# Patient Record
Sex: FEMALE | Race: WHITE
[De-identification: names, ages, dates, MRNs, and addresses within clinical notes are randomized per-mention and may not be internally consistent; named-entity substitution may affect disease eponyms.]

---

## 2017-08-27 ENCOUNTER — HOSPITAL ENCOUNTER (EMERGENCY)
Dept: HOSPITAL 53 - M ED | Age: 62
Discharge: HOME | End: 2017-08-27
Payer: SELF-PAY

## 2017-08-27 VITALS — DIASTOLIC BLOOD PRESSURE: 70 MMHG | SYSTOLIC BLOOD PRESSURE: 132 MMHG

## 2017-08-27 VITALS — WEIGHT: 248.46 LBS | HEIGHT: 66 IN | BODY MASS INDEX: 39.93 KG/M2

## 2017-08-27 DIAGNOSIS — F99: ICD-10-CM

## 2017-08-27 DIAGNOSIS — J41.8: Primary | ICD-10-CM

## 2017-08-27 DIAGNOSIS — Z88.1: ICD-10-CM

## 2018-05-16 ENCOUNTER — HOSPITAL ENCOUNTER (OUTPATIENT)
Dept: HOSPITAL 53 - M RAD | Age: 63
End: 2018-05-16
Attending: INTERNAL MEDICINE
Payer: MEDICARE

## 2018-05-16 DIAGNOSIS — R22.42: Primary | ICD-10-CM

## 2018-05-16 PROCEDURE — 93971 EXTREMITY STUDY: CPT

## 2018-07-30 ENCOUNTER — HOSPITAL ENCOUNTER (INPATIENT)
Dept: HOSPITAL 53 - M ED | Age: 63
LOS: 9 days | Discharge: HOME | DRG: 885 | End: 2018-08-08
Attending: PSYCHIATRY & NEUROLOGY | Admitting: PSYCHIATRY & NEUROLOGY
Payer: MEDICARE

## 2018-07-30 DIAGNOSIS — Z87.891: ICD-10-CM

## 2018-07-30 DIAGNOSIS — Z88.1: ICD-10-CM

## 2018-07-30 DIAGNOSIS — R51: ICD-10-CM

## 2018-07-30 DIAGNOSIS — K59.00: ICD-10-CM

## 2018-07-30 DIAGNOSIS — Z79.82: ICD-10-CM

## 2018-07-30 DIAGNOSIS — I48.0: ICD-10-CM

## 2018-07-30 DIAGNOSIS — E11.9: ICD-10-CM

## 2018-07-30 DIAGNOSIS — F20.0: Primary | ICD-10-CM

## 2018-07-30 DIAGNOSIS — Z98.51: ICD-10-CM

## 2018-07-30 DIAGNOSIS — Z79.899: ICD-10-CM

## 2018-07-30 DIAGNOSIS — E66.9: ICD-10-CM

## 2018-07-30 DIAGNOSIS — M54.9: ICD-10-CM

## 2018-07-30 LAB
ACETAMINOPHEN LEVEL: < 2 UG/ML (ref 10–30)
ALBUMIN/GLOBULIN RATIO: 0.89 (ref 1–1.93)
ALBUMIN: 3.4 GM/DL (ref 3.2–5.2)
ALKALINE PHOSPHATASE: 73 U/L (ref 45–117)
ALT SERPL W P-5'-P-CCNC: 27 U/L (ref 12–78)
AMPHETAMINES UR QL SCN: NEGATIVE
ANION GAP: 7 MEQ/L (ref 8–16)
AST SERPL-CCNC: 20 U/L (ref 7–37)
BARBITURATES UR QL SCN: NEGATIVE
BENZODIAZ UR QL SCN: NEGATIVE
BILIRUB CONJ SERPL-MCNC: 0.2 MG/DL (ref 0–0.2)
BILIRUBIN,TOTAL: 0.7 MG/DL (ref 0.2–1)
BLOOD UREA NITROGEN: 13 MG/DL (ref 7–18)
BZE UR QL SCN: NEGATIVE
CALCIUM LEVEL: 8.2 MG/DL (ref 8.8–10.2)
CANNABINOIDS UR QL SCN: NEGATIVE
CARBON DIOXIDE LEVEL: 28 MEQ/L (ref 21–32)
CHLORIDE LEVEL: 106 MEQ/L (ref 98–107)
CREATININE FOR GFR: 0.68 MG/DL (ref 0.55–1.3)
ETHYL ALCOHOL (ETHANOL): < 0.003 % (ref 0–0.01)
GFR SERPL CREATININE-BSD FRML MDRD: > 60 ML/MIN/{1.73_M2} (ref 45–?)
GLUCOSE, FASTING: 129 MG/DL (ref 70–100)
HEMATOCRIT: 40.1 % (ref 36–47)
HEMOGLOBIN: 12.9 G/DL (ref 12–15.5)
MEAN CORPUSCULAR HEMOGLOBIN: 28.3 PG (ref 27–33)
MEAN CORPUSCULAR HGB CONC: 32.2 G/DL (ref 32–36.5)
MEAN CORPUSCULAR VOLUME: 87.9 FL (ref 80–96)
METHADONE URINE: NEGATIVE
NRBC BLD AUTO-RTO: 0 % (ref 0–0)
OPIATES UR QL SCN: NEGATIVE
PCP UR QL SCN: NEGATIVE
PLATELET COUNT, AUTOMATED: 201 10^3/UL (ref 150–450)
POTASSIUM SERUM: 4.1 MEQ/L (ref 3.5–5.1)
RED BLOOD COUNT: 4.56 10^6/UL (ref 4–5.4)
RED CELL DISTRIBUTION WIDTH: 14 % (ref 11.5–14.5)
SALICYLATE LEVEL: 1.7 MG/DL (ref 5–30)
SODIUM LEVEL: 141 MEQ/L (ref 136–145)
SPECIFIC GRAVITY,URINE: 1.01 (ref 1–1.03)
THYROID STIMULATING HORMONE: 1.51 UIU/ML (ref 0.36–3.74)
TOTAL PROTEIN: 7.2 GM/DL (ref 6.4–8.2)
WHITE BLOOD COUNT: 6.5 10^3/UL (ref 4–10)

## 2018-07-30 RX ADMIN — ACETAMINOPHEN 1 MG: 325 TABLET ORAL at 20:57

## 2018-07-31 LAB
CK MB CFR.DF SERPL CALC: 1.19
CK MB CFR.DF SERPL CALC: 1.73
CK SERPL-CCNC: 104 U/L (ref 26–192)
CK SERPL-CCNC: 226 U/L (ref 26–192)
CK-MB VALUE MASS: 1.8 NG/ML (ref ?–3.6)
CK-MB VALUE MASS: 2.7 NG/ML (ref ?–3.6)
TROPONIN I: < 0.02 NG/ML (ref ?–0.1)
TROPONIN I: < 0.02 NG/ML (ref ?–0.1)

## 2018-07-31 RX ADMIN — ACETAMINOPHEN 1 MG: 325 TABLET ORAL at 15:49

## 2018-07-31 RX ADMIN — DOCUSATE SODIUM 1 MG: 100 CAPSULE, LIQUID FILLED ORAL at 11:13

## 2018-07-31 RX ADMIN — DOCUSATE SODIUM 1 MG: 100 CAPSULE, LIQUID FILLED ORAL at 21:00

## 2018-07-31 RX ADMIN — ACETAMINOPHEN 1 MG: 325 TABLET ORAL at 09:36

## 2018-08-01 LAB
ANION GAP: 9 MEQ/L (ref 8–16)
BLOOD UREA NITROGEN: 17 MG/DL (ref 7–18)
CALCIUM LEVEL: 8.6 MG/DL (ref 8.8–10.2)
CARBON DIOXIDE LEVEL: 30 MEQ/L (ref 21–32)
CHLORIDE LEVEL: 103 MEQ/L (ref 98–107)
CREATININE FOR GFR: 0.8 MG/DL (ref 0.55–1.3)
EST. AVERAGE GLUCOSE BLD GHB EST-MCNC: 151 MG/DL (ref 60–110)
GFR SERPL CREATININE-BSD FRML MDRD: > 60 ML/MIN/{1.73_M2} (ref 45–?)
GLUCOSE, FASTING: 127 MG/DL (ref 70–100)
HEMATOCRIT: 44 % (ref 36–47)
HEMOGLOBIN: 14.3 G/DL (ref 12–15.5)
MAGNESIUM LEVEL: 2.2 MG/DL (ref 1.8–2.4)
MEAN CORPUSCULAR HEMOGLOBIN: 28.2 PG (ref 27–33)
MEAN CORPUSCULAR HGB CONC: 32.5 G/DL (ref 32–36.5)
MEAN CORPUSCULAR VOLUME: 86.8 FL (ref 80–96)
NRBC BLD AUTO-RTO: 0 % (ref 0–0)
PLATELET COUNT, AUTOMATED: 206 10^3/UL (ref 150–450)
POTASSIUM SERUM: 4.4 MEQ/L (ref 3.5–5.1)
RED BLOOD COUNT: 5.07 10^6/UL (ref 4–5.4)
RED CELL DISTRIBUTION WIDTH: 14.1 % (ref 11.5–14.5)
SODIUM LEVEL: 142 MEQ/L (ref 136–145)
WHITE BLOOD COUNT: 7.4 10^3/UL (ref 4–10)

## 2018-08-01 RX ADMIN — DOCUSATE SODIUM 1 MG: 100 CAPSULE, LIQUID FILLED ORAL at 21:34

## 2018-08-01 RX ADMIN — ASPIRIN 1 MG: 81 TABLET ORAL at 08:55

## 2018-08-01 RX ADMIN — ACETAMINOPHEN 1 MG: 325 TABLET ORAL at 08:56

## 2018-08-01 RX ADMIN — DOCUSATE SODIUM 1 MG: 100 CAPSULE, LIQUID FILLED ORAL at 09:00

## 2018-08-02 LAB
ANION GAP: 7 MEQ/L (ref 8–16)
APPEARANCE, URINE: (no result)
BACTERIA UR QL AUTO: NEGATIVE
BILIRUBIN, URINE AUTO: NEGATIVE
BLOOD UREA NITROGEN: 17 MG/DL (ref 7–18)
BLOOD, URINE BLOOD: NEGATIVE
CALCIUM LEVEL: 8.5 MG/DL (ref 8.8–10.2)
CARBON DIOXIDE LEVEL: 29 MEQ/L (ref 21–32)
CHLORIDE LEVEL: 102 MEQ/L (ref 98–107)
CREATININE FOR GFR: 0.83 MG/DL (ref 0.55–1.3)
GFR SERPL CREATININE-BSD FRML MDRD: > 60 ML/MIN/{1.73_M2} (ref 45–?)
GLUCOSE, FASTING: 137 MG/DL (ref 70–100)
GLUCOSE, URINE (UA) AUTO: NEGATIVE MG/DL
HEMATOCRIT: 42.1 % (ref 36–47)
HEMOGLOBIN: 13.7 G/DL (ref 12–15.5)
KETONE, URINE AUTO: NEGATIVE MG/DL
LEUKOCYTE ESTERASE UR QL STRIP.AUTO: (no result)
MAGNESIUM LEVEL: 2.2 MG/DL (ref 1.8–2.4)
MEAN CORPUSCULAR HEMOGLOBIN: 28.2 PG (ref 27–33)
MEAN CORPUSCULAR HGB CONC: 32.5 G/DL (ref 32–36.5)
MEAN CORPUSCULAR VOLUME: 86.6 FL (ref 80–96)
MUCUS, URINE: (no result)
NITRITE, URINE AUTO: NEGATIVE
NRBC BLD AUTO-RTO: 0 % (ref 0–0)
PH,URINE: 5 UNITS (ref 5–9)
PLATELET COUNT, AUTOMATED: 231 10^3/UL (ref 150–450)
POTASSIUM SERUM: 4.1 MEQ/L (ref 3.5–5.1)
PROT UR QL STRIP.AUTO: NEGATIVE MG/DL
RBC, URINE AUTO: 0 /HPF (ref 0–3)
RED BLOOD COUNT: 4.86 10^6/UL (ref 4–5.4)
RED CELL DISTRIBUTION WIDTH: 14 % (ref 11.5–14.5)
SODIUM LEVEL: 138 MEQ/L (ref 136–145)
SPECIFIC GRAVITY URINE AUTO: 1.02 (ref 1–1.03)
SQUAMOUS #/AREA URNS AUTO: 1 /HPF (ref 0–6)
UROBILINOGEN, URINE AUTO: 2 MG/DL (ref 0–2)
WBC, URINE AUTO: 5 /HPF (ref 0–3)
WHITE BLOOD COUNT: 8.4 10^3/UL (ref 4–10)

## 2018-08-02 RX ADMIN — ASPIRIN 1 MG: 81 TABLET ORAL at 10:02

## 2018-08-02 RX ADMIN — DOCUSATE SODIUM 1 MG: 100 CAPSULE, LIQUID FILLED ORAL at 21:19

## 2018-08-02 RX ADMIN — DOCUSATE SODIUM 1 MG: 100 CAPSULE, LIQUID FILLED ORAL at 10:02

## 2018-08-03 LAB
ANION GAP: 7 MEQ/L (ref 8–16)
BLOOD UREA NITROGEN: 21 MG/DL (ref 7–18)
CALCIUM LEVEL: 8.7 MG/DL (ref 8.8–10.2)
CARBON DIOXIDE LEVEL: 31 MEQ/L (ref 21–32)
CHLORIDE LEVEL: 104 MEQ/L (ref 98–107)
CREATININE FOR GFR: 0.85 MG/DL (ref 0.55–1.3)
GFR SERPL CREATININE-BSD FRML MDRD: > 60 ML/MIN/{1.73_M2} (ref 45–?)
GLUCOSE, FASTING: 125 MG/DL (ref 70–100)
HEMATOCRIT: 41.4 % (ref 36–47)
HEMOGLOBIN: 13.3 G/DL (ref 12–15.5)
MAGNESIUM LEVEL: 2.3 MG/DL (ref 1.8–2.4)
MEAN CORPUSCULAR HEMOGLOBIN: 28.2 PG (ref 27–33)
MEAN CORPUSCULAR HGB CONC: 32.1 G/DL (ref 32–36.5)
MEAN CORPUSCULAR VOLUME: 87.9 FL (ref 80–96)
NRBC BLD AUTO-RTO: 0 % (ref 0–0)
PLATELET COUNT, AUTOMATED: 192 10^3/UL (ref 150–450)
POTASSIUM SERUM: 4.5 MEQ/L (ref 3.5–5.1)
RED BLOOD COUNT: 4.71 10^6/UL (ref 4–5.4)
RED CELL DISTRIBUTION WIDTH: 14 % (ref 11.5–14.5)
SODIUM LEVEL: 142 MEQ/L (ref 136–145)
WHITE BLOOD COUNT: 7 10^3/UL (ref 4–10)

## 2018-08-03 RX ADMIN — ACETAMINOPHEN 1 MG: 325 TABLET ORAL at 06:25

## 2018-08-03 RX ADMIN — ARIPIPRAZOLE 1 MG: 15 TABLET ORAL at 21:55

## 2018-08-03 RX ADMIN — ASPIRIN 1 MG: 81 TABLET ORAL at 09:02

## 2018-08-03 RX ADMIN — ACETAMINOPHEN 1 MG: 325 TABLET ORAL at 17:08

## 2018-08-03 RX ADMIN — DOCUSATE SODIUM 1 MG: 100 CAPSULE, LIQUID FILLED ORAL at 09:01

## 2018-08-03 RX ADMIN — DOCUSATE SODIUM 1 MG: 100 CAPSULE, LIQUID FILLED ORAL at 21:55

## 2018-08-04 LAB
ANION GAP: 7 MEQ/L (ref 8–16)
BLOOD UREA NITROGEN: 20 MG/DL (ref 7–18)
CALCIUM LEVEL: 8.6 MG/DL (ref 8.8–10.2)
CARBON DIOXIDE LEVEL: 30 MEQ/L (ref 21–32)
CHLORIDE LEVEL: 103 MEQ/L (ref 98–107)
CREATININE FOR GFR: 0.81 MG/DL (ref 0.55–1.3)
GFR SERPL CREATININE-BSD FRML MDRD: > 60 ML/MIN/{1.73_M2} (ref 45–?)
GLUCOSE, FASTING: 129 MG/DL (ref 70–100)
HEMATOCRIT: 42.6 % (ref 36–47)
HEMOGLOBIN: 13.7 G/DL (ref 12–15.5)
MAGNESIUM LEVEL: 2.2 MG/DL (ref 1.8–2.4)
MEAN CORPUSCULAR HEMOGLOBIN: 28.3 PG (ref 27–33)
MEAN CORPUSCULAR HGB CONC: 32.2 G/DL (ref 32–36.5)
MEAN CORPUSCULAR VOLUME: 88 FL (ref 80–96)
NRBC BLD AUTO-RTO: 0 % (ref 0–0)
PLATELET COUNT, AUTOMATED: 224 10^3/UL (ref 150–450)
POTASSIUM SERUM: 4.4 MEQ/L (ref 3.5–5.1)
RED BLOOD COUNT: 4.84 10^6/UL (ref 4–5.4)
RED CELL DISTRIBUTION WIDTH: 14 % (ref 11.5–14.5)
SODIUM LEVEL: 140 MEQ/L (ref 136–145)
WHITE BLOOD COUNT: 6.7 10^3/UL (ref 4–10)

## 2018-08-04 RX ADMIN — ACETAMINOPHEN 1 MG: 325 TABLET ORAL at 05:55

## 2018-08-04 RX ADMIN — DOCUSATE SODIUM 1 MG: 100 CAPSULE, LIQUID FILLED ORAL at 08:02

## 2018-08-04 RX ADMIN — ARIPIPRAZOLE 1 MG: 15 TABLET ORAL at 21:13

## 2018-08-04 RX ADMIN — ACETAMINOPHEN 1 MG: 325 TABLET ORAL at 21:13

## 2018-08-04 RX ADMIN — DOCUSATE SODIUM 1 MG: 100 CAPSULE, LIQUID FILLED ORAL at 21:13

## 2018-08-04 RX ADMIN — ARIPIPRAZOLE 1 MG: 15 TABLET ORAL at 08:03

## 2018-08-04 RX ADMIN — ASPIRIN 1 MG: 81 TABLET ORAL at 08:03

## 2018-08-05 LAB
ANION GAP: 7 MEQ/L (ref 8–16)
BLOOD UREA NITROGEN: 19 MG/DL (ref 7–18)
CALCIUM LEVEL: 8.8 MG/DL (ref 8.8–10.2)
CARBON DIOXIDE LEVEL: 29 MEQ/L (ref 21–32)
CHLORIDE LEVEL: 105 MEQ/L (ref 98–107)
CREATININE FOR GFR: 0.77 MG/DL (ref 0.55–1.3)
GFR SERPL CREATININE-BSD FRML MDRD: > 60 ML/MIN/{1.73_M2} (ref 45–?)
GLUCOSE, FASTING: 132 MG/DL (ref 70–100)
HEMATOCRIT: 43.3 % (ref 36–47)
HEMOGLOBIN: 13.8 G/DL (ref 12–15.5)
MAGNESIUM LEVEL: 2.3 MG/DL (ref 1.8–2.4)
MEAN CORPUSCULAR HEMOGLOBIN: 28.2 PG (ref 27–33)
MEAN CORPUSCULAR HGB CONC: 31.9 G/DL (ref 32–36.5)
MEAN CORPUSCULAR VOLUME: 88.4 FL (ref 80–96)
NRBC BLD AUTO-RTO: 0 % (ref 0–0)
PLATELET COUNT, AUTOMATED: 225 10^3/UL (ref 150–450)
POTASSIUM SERUM: 4.4 MEQ/L (ref 3.5–5.1)
RED BLOOD COUNT: 4.9 10^6/UL (ref 4–5.4)
RED CELL DISTRIBUTION WIDTH: 14.1 % (ref 11.5–14.5)
SODIUM LEVEL: 141 MEQ/L (ref 136–145)
WHITE BLOOD COUNT: 7.3 10^3/UL (ref 4–10)

## 2018-08-05 RX ADMIN — ARIPIPRAZOLE 1 MG: 15 TABLET ORAL at 21:20

## 2018-08-05 RX ADMIN — DOCUSATE SODIUM 1 MG: 100 CAPSULE, LIQUID FILLED ORAL at 08:12

## 2018-08-05 RX ADMIN — ASPIRIN 1 MG: 81 TABLET ORAL at 08:12

## 2018-08-05 RX ADMIN — ACETAMINOPHEN 1 MG: 325 TABLET ORAL at 08:12

## 2018-08-05 RX ADMIN — ARIPIPRAZOLE 1 MG: 15 TABLET ORAL at 08:12

## 2018-08-05 RX ADMIN — DOCUSATE SODIUM 1 MG: 100 CAPSULE, LIQUID FILLED ORAL at 21:19

## 2018-08-06 LAB
ANION GAP: 7 MEQ/L (ref 8–16)
BLOOD UREA NITROGEN: 18 MG/DL (ref 7–18)
CALCIUM LEVEL: 8.5 MG/DL (ref 8.8–10.2)
CARBON DIOXIDE LEVEL: 30 MEQ/L (ref 21–32)
CHLORIDE LEVEL: 103 MEQ/L (ref 98–107)
CREATININE FOR GFR: 0.84 MG/DL (ref 0.55–1.3)
GFR SERPL CREATININE-BSD FRML MDRD: > 60 ML/MIN/{1.73_M2} (ref 45–?)
GLUCOSE, FASTING: 146 MG/DL (ref 70–100)
HEMATOCRIT: 41.5 % (ref 36–47)
HEMOGLOBIN: 13 G/DL (ref 12–15.5)
MAGNESIUM LEVEL: 2.2 MG/DL (ref 1.8–2.4)
MEAN CORPUSCULAR HEMOGLOBIN: 27.7 PG (ref 27–33)
MEAN CORPUSCULAR HGB CONC: 31.3 G/DL (ref 32–36.5)
MEAN CORPUSCULAR VOLUME: 88.3 FL (ref 80–96)
NRBC BLD AUTO-RTO: 0 % (ref 0–0)
PLATELET COUNT, AUTOMATED: 218 10^3/UL (ref 150–450)
POTASSIUM SERUM: 4.2 MEQ/L (ref 3.5–5.1)
RED BLOOD COUNT: 4.7 10^6/UL (ref 4–5.4)
RED CELL DISTRIBUTION WIDTH: 14 % (ref 11.5–14.5)
SODIUM LEVEL: 140 MEQ/L (ref 136–145)
WHITE BLOOD COUNT: 6.7 10^3/UL (ref 4–10)

## 2018-08-06 RX ADMIN — DOCUSATE SODIUM 1 MG: 100 CAPSULE, LIQUID FILLED ORAL at 21:42

## 2018-08-06 RX ADMIN — ARIPIPRAZOLE 1 MG: KIT at 11:47

## 2018-08-06 RX ADMIN — ASPIRIN 1 MG: 81 TABLET ORAL at 08:45

## 2018-08-06 RX ADMIN — DOCUSATE SODIUM 1 MG: 100 CAPSULE, LIQUID FILLED ORAL at 08:45

## 2018-08-06 RX ADMIN — ACETAMINOPHEN 1 MG: 325 TABLET ORAL at 06:49

## 2018-08-06 RX ADMIN — ARIPIPRAZOLE 1 MG: 15 TABLET ORAL at 08:45

## 2018-08-07 LAB
ANION GAP: 7 MEQ/L (ref 8–16)
BLOOD UREA NITROGEN: 16 MG/DL (ref 7–18)
CALCIUM LEVEL: 8.6 MG/DL (ref 8.8–10.2)
CARBON DIOXIDE LEVEL: 27 MEQ/L (ref 21–32)
CHLORIDE LEVEL: 107 MEQ/L (ref 98–107)
CREATININE FOR GFR: 0.71 MG/DL (ref 0.55–1.3)
GFR SERPL CREATININE-BSD FRML MDRD: > 60 ML/MIN/{1.73_M2} (ref 45–?)
GLUCOSE, FASTING: 124 MG/DL (ref 70–100)
HEMATOCRIT: 40 % (ref 36–47)
HEMOGLOBIN: 13.2 G/DL (ref 12–15.5)
MAGNESIUM LEVEL: 2.2 MG/DL (ref 1.8–2.4)
MEAN CORPUSCULAR HEMOGLOBIN: 28.1 PG (ref 27–33)
MEAN CORPUSCULAR HGB CONC: 33 G/DL (ref 32–36.5)
MEAN CORPUSCULAR VOLUME: 85.3 FL (ref 80–96)
NRBC BLD AUTO-RTO: 0 % (ref 0–0)
PLATELET COUNT, AUTOMATED: 215 10^3/UL (ref 150–450)
POTASSIUM SERUM: 4.2 MEQ/L (ref 3.5–5.1)
RED BLOOD COUNT: 4.69 10^6/UL (ref 4–5.4)
RED CELL DISTRIBUTION WIDTH: 14.2 % (ref 11.5–14.5)
SODIUM LEVEL: 141 MEQ/L (ref 136–145)
WHITE BLOOD COUNT: 7.6 10^3/UL (ref 4–10)

## 2018-08-07 RX ADMIN — DOCUSATE SODIUM 1 MG: 100 CAPSULE, LIQUID FILLED ORAL at 08:49

## 2018-08-07 RX ADMIN — ACETAMINOPHEN 1 MG: 325 TABLET ORAL at 17:36

## 2018-08-07 RX ADMIN — ASPIRIN 1 MG: 81 TABLET ORAL at 08:50

## 2018-08-07 RX ADMIN — DOCUSATE SODIUM 1 MG: 100 CAPSULE, LIQUID FILLED ORAL at 19:57

## 2018-08-07 RX ADMIN — ACETAMINOPHEN 1 MG: 325 TABLET ORAL at 08:51

## 2018-08-08 RX ADMIN — DOCUSATE SODIUM 1 MG: 100 CAPSULE, LIQUID FILLED ORAL at 08:35

## 2018-08-08 RX ADMIN — ASPIRIN 1 MG: 81 TABLET ORAL at 08:36

## 2018-08-08 RX ADMIN — ACETAMINOPHEN 1 MG: 325 TABLET ORAL at 06:35

## 2020-07-07 ENCOUNTER — HOSPITAL ENCOUNTER (INPATIENT)
Dept: HOSPITAL 53 - M MSPAV | Age: 65
LOS: 2 days | Discharge: PSYCHIATRIC OR SUBSTANCE USE FACILITY | DRG: 309 | End: 2020-07-09
Attending: INTERNAL MEDICINE | Admitting: INTERNAL MEDICINE
Payer: MEDICARE

## 2020-07-07 VITALS — SYSTOLIC BLOOD PRESSURE: 139 MMHG | DIASTOLIC BLOOD PRESSURE: 93 MMHG

## 2020-07-07 VITALS — SYSTOLIC BLOOD PRESSURE: 138 MMHG | DIASTOLIC BLOOD PRESSURE: 102 MMHG

## 2020-07-07 VITALS — WEIGHT: 280.21 LBS | BODY MASS INDEX: 45.03 KG/M2 | HEIGHT: 66 IN

## 2020-07-07 VITALS — DIASTOLIC BLOOD PRESSURE: 91 MMHG | SYSTOLIC BLOOD PRESSURE: 163 MMHG

## 2020-07-07 VITALS — SYSTOLIC BLOOD PRESSURE: 154 MMHG | DIASTOLIC BLOOD PRESSURE: 78 MMHG

## 2020-07-07 DIAGNOSIS — F32.9: ICD-10-CM

## 2020-07-07 DIAGNOSIS — I10: ICD-10-CM

## 2020-07-07 DIAGNOSIS — Z91.14: ICD-10-CM

## 2020-07-07 DIAGNOSIS — M19.90: ICD-10-CM

## 2020-07-07 DIAGNOSIS — F20.0: ICD-10-CM

## 2020-07-07 DIAGNOSIS — M54.9: ICD-10-CM

## 2020-07-07 DIAGNOSIS — F41.9: ICD-10-CM

## 2020-07-07 DIAGNOSIS — Z88.8: ICD-10-CM

## 2020-07-07 DIAGNOSIS — I48.92: Primary | ICD-10-CM

## 2020-07-07 DIAGNOSIS — E11.9: ICD-10-CM

## 2020-07-07 DIAGNOSIS — E66.01: ICD-10-CM

## 2020-07-07 DIAGNOSIS — K21.9: ICD-10-CM

## 2020-07-07 DIAGNOSIS — I87.2: ICD-10-CM

## 2020-07-07 LAB
ALBUMIN SERPL BCG-MCNC: 3.2 GM/DL (ref 3.2–5.2)
ALT SERPL W P-5'-P-CCNC: 20 U/L (ref 12–78)
BILIRUB SERPL-MCNC: 0.5 MG/DL (ref 0.2–1)
BUN SERPL-MCNC: 20 MG/DL (ref 7–18)
CALCIUM SERPL-MCNC: 8.3 MG/DL (ref 8.8–10.2)
CHLORIDE SERPL-SCNC: 105 MEQ/L (ref 98–107)
CHOLEST SERPL-MCNC: 165 MG/DL (ref ?–200)
CHOLEST/HDLC SERPL: 5.89 {RATIO} (ref ?–5)
CO2 SERPL-SCNC: 27 MEQ/L (ref 21–32)
CREAT SERPL-MCNC: 0.79 MG/DL (ref 0.55–1.3)
GFR SERPL CREATININE-BSD FRML MDRD: > 60 ML/MIN/{1.73_M2} (ref 45–?)
GLUCOSE SERPL-MCNC: 195 MG/DL (ref 70–100)
HDLC SERPL-MCNC: 28 MG/DL (ref 40–?)
LDLC SERPL CALC-MCNC: 75 MG/DL (ref ?–100)
MAGNESIUM SERPL-MCNC: 2.1 MG/DL (ref 1.8–2.4)
NONHDLC SERPL-MCNC: 137 MG/DL
PHOSPHATE SERPL-MCNC: 3.3 MG/DL (ref 2.5–4.9)
POTASSIUM SERPL-SCNC: 4 MEQ/L (ref 3.5–5.1)
PROT SERPL-MCNC: 7 GM/DL (ref 6.4–8.2)
SODIUM SERPL-SCNC: 137 MEQ/L (ref 136–145)
T4 FREE SERPL-MCNC: 0.97 NG/DL (ref 0.76–1.46)
TRIGL SERPL-MCNC: 311 MG/DL (ref ?–150)
TSH SERPL DL<=0.005 MIU/L-ACNC: 0.28 UIU/ML (ref 0.36–3.74)

## 2020-07-07 RX ADMIN — INSULIN LISPRO SCH UNITS: 100 INJECTION, SOLUTION INTRAVENOUS; SUBCUTANEOUS at 20:15

## 2020-07-07 RX ADMIN — METOPROLOL TARTRATE SCH MG: 25 TABLET, FILM COATED ORAL at 17:37

## 2020-07-07 RX ADMIN — APIXABAN SCH MG: 5 TABLET, FILM COATED ORAL at 16:32

## 2020-07-07 RX ADMIN — METOPROLOL TARTRATE SCH MG: 25 TABLET, FILM COATED ORAL at 21:24

## 2020-07-07 RX ADMIN — INSULIN LISPRO SCH UNITS: 100 INJECTION, SOLUTION INTRAVENOUS; SUBCUTANEOUS at 17:43

## 2020-07-07 NOTE — HPEPDOC
[facility] Medical History & Physical


Date of Admission


Jul 7, 2020


Date of Service:  Jul 7, 2020


Attending Physician:  JACKI LYNN MD





History and Physical


CHIEF COMPLAINT: None. Incidentally noted variable heart rate on [facility] 

examination





HISTORY OF PRESENT ILLNESS: 


63 yo W with a history of medication non compliance, rarely sees doctors, 

reporting a history of abdominal hernia for which she did not follow through 

with surgery for repair, diagnosis of DM for which she takes no medications, 

GED, HTN, chronic back pain, osteoarthritis, and an extensive psychiatric 

history of schizophrenia, depression, anxiety and multiple admissions in the 

[facility] who was admitted to the [facility] today for command audiotry hallucinations 

telling her to cause self harm whom on nursing examination on arrival to the 

[facility] was noted to have a variable heart beat with variable heart rate with 

tachyardia up to 150s. The nurse requested a medicine consult and on stat EKG 

she had atrial flutter with a rate of 156. She was otherwise asymptomatic, 

normotensive and reported that she sometimes has palpitations but never worries 

about it because it does not bother her. She otherwise denied a history of a 

stroke, asymmetrical weakness, dyspnea, chest pain, abdominal pain, fever, 

chills, LE swelling, orthopnea or paroxysmal nocturnal dyspnea. I decided to 

admit her to medicine for newly noted aftrial flutter. On arrival, I initially 

admitted her to med/surg with tele but her RVR continued and I started her on 

metop 25 Q6H and she was transferred to the PCU boarding in the ICU just incase 

she would require IV medication for rate control. She ultimately broke and went 

back in to sinus. In the meantime, I started her on eliquis and consulted Dr. Car.





PAST MEDICAL HISTORY:


DM for which she takes no medications, GED, HTN, chronic back pain, 

osteoarthritis, and an extensive psychiatric history of schizophrenia, 

depression, anxiety and multiple admissions in the [facility] 





PAST SURGICAL HISTORY: Tubal ligation





SOCIAL HISTORY:


Tobacco use: NO


ETOH: NO


Illicit drug use: NO





FAMILY HISTORY: Non contributory





ALLERGIES: Please see below.





REVIEW OF SYSTEMS:12 point ROS was reviewed and all the pertinent positives are 

noted in the HPI above and was otherwise negative.





HOME MEDICATIONS: Please see below. 





PHYSICAL EXAMINATION:


VITAL SIGNS: normotensive, tachycardic to 130s, afebrile, breathing comfortably 

on room air


GENERAL APPEARANCE: Morbidly obese


HEENT: NCAT, PERRLA, EOMI, MMM


CARDIOVASCULAR: Irregularly irregular


LUNGS: CTAB


ABDOMEN: Large abdominal hernia, nontender, obese, normoactive, soft


EXTREMITIES: has bilateral LE edema, WWP


NEUROLOGICAL: normal gait, AOx3, without dysarthria


PSYCHIATRIC: Aox3





LABORATORY DATA: See below.





IMAGING: CXR pending





MICROBIOLOGY: Please see below. 





ASSESSMENT: 


63 yo W with a history of medication non compliance, rarely sees doctors, 

reporting a history of abdominal hernia for which she did not follow through 

with surgery for repair, diagnosis of DM for which she takes no medications, 

GED, HTN, chronic back pain, osteoarthritis, and an extensive psychiatric 

history of schizophrenia, depression, anxiety and multiple admissions in the 

[facility] who was admitted to the [facility] today for command auditotry hallucinations 

telling her to cause self harm whom on nursing examination on arrival to the 

[facility] was noted to have a variable heart beat with variable heart rate with 

tachyardia up to 150s and found to be in Aflutter.








PLAN:


Atrial flutter: likely paroxysmal and longstanding from her description of 

episodic palpitations


-Start eliquis 5 BID


-TTE


-TSH low, freeT4 was wnl


-CXR pending


-troponin negative


-EKG was with Aflutter without ST changes


-No noted clear source of infection


-K and Mag wnl


-Cardiology consulted. Dr. Car will see her tomorrow





DM:


-SSI, FSBG AC/HS, hypoglycemia protocol





Audiotry hallucination:


-psych consult





DVT ppx: eliquis


Dispo: PCU, boarding in the ICU





Vital Signs





Vital Signs








  Date Time  Temp Pulse Resp B/P (MAP) Pulse Ox O2 Delivery O2 Flow Rate FiO2


 


7/7/20 15:16 97.0 139 14 154/78 (103) 95 Room Air  











Laboratory Data


Labs 24H


Laboratory Tests 2


7/7/20 14:19: 


Anion Gap 5L, Glomerular Filtration Rate > 60.0, Calcium Level 8.3L, Phosphorus 

Level 3.3, Magnesium Level 2.1, Total Bilirubin 0.5, Aspartate Amino Transf 

(AST/SGOT) 18, Alanine Aminotransferase (ALT/SGPT) 20, Alkaline Phosphatase 63, 

Troponin I < 0.02, Total Protein 7.0, Albumin 3.2, Albumin/Globulin Ratio 0.8L, 

Triglycerides Level 311H, Total Cholesterol 165, LDL Cholesterol 75, Non-HDL 

Cholesterol (LDL + VLDL) 137, Total HDL Cholesterol 28L, Cholesterol/HDL Ratio 

5.892H, Thyroid Stimulating Hormone (TSH) 0.275L, Free Thyroxine 0.97


CBC/BMP


Laboratory Tests


7/7/20 14:19











Home Medications


Scheduled PRN


Aspirin/Acetaminophen/Caffeine (Headache Relief Tablet) 1 Tab Tab, 1 TAB PO Q6H 

PRN for HEADACHE





Allergies


Coded Allergies:  


     levofloxacin (Verified  Adverse Reaction, Unknown, DIARRHEA, 7/7/20)





A-FIB/CHADSVASC


A-FIB History


Current/History of A-Fib/PAF?:  Yes


Current PO Anticoag Therapy:  Yes











JACKI LYNN MD    Jul 7, 2020 17:52

## 2020-07-08 VITALS — SYSTOLIC BLOOD PRESSURE: 120 MMHG | DIASTOLIC BLOOD PRESSURE: 58 MMHG

## 2020-07-08 VITALS — SYSTOLIC BLOOD PRESSURE: 120 MMHG | DIASTOLIC BLOOD PRESSURE: 60 MMHG

## 2020-07-08 VITALS — SYSTOLIC BLOOD PRESSURE: 143 MMHG | DIASTOLIC BLOOD PRESSURE: 72 MMHG

## 2020-07-08 VITALS — DIASTOLIC BLOOD PRESSURE: 69 MMHG | SYSTOLIC BLOOD PRESSURE: 113 MMHG

## 2020-07-08 VITALS — SYSTOLIC BLOOD PRESSURE: 114 MMHG | DIASTOLIC BLOOD PRESSURE: 54 MMHG

## 2020-07-08 VITALS — SYSTOLIC BLOOD PRESSURE: 125 MMHG | DIASTOLIC BLOOD PRESSURE: 66 MMHG

## 2020-07-08 LAB
ALBUMIN SERPL BCG-MCNC: 3.1 GM/DL (ref 3.2–5.2)
ALT SERPL W P-5'-P-CCNC: 22 U/L (ref 12–78)
BILIRUB SERPL-MCNC: 1.1 MG/DL (ref 0.2–1)
BUN SERPL-MCNC: 21 MG/DL (ref 7–18)
CALCIUM SERPL-MCNC: 8.5 MG/DL (ref 8.8–10.2)
CHLORIDE SERPL-SCNC: 103 MEQ/L (ref 98–107)
CO2 SERPL-SCNC: 31 MEQ/L (ref 21–32)
CREAT SERPL-MCNC: 0.8 MG/DL (ref 0.55–1.3)
GFR SERPL CREATININE-BSD FRML MDRD: > 60 ML/MIN/{1.73_M2} (ref 45–?)
GLUCOSE SERPL-MCNC: 163 MG/DL (ref 70–100)
HCT VFR BLD AUTO: 43 % (ref 36–47)
HGB BLD-MCNC: 13.8 G/DL (ref 12–15.5)
MAGNESIUM SERPL-MCNC: 2.1 MG/DL (ref 1.8–2.4)
MCH RBC QN AUTO: 29.3 PG (ref 27–33)
MCHC RBC AUTO-ENTMCNC: 32.1 G/DL (ref 32–36.5)
MCV RBC AUTO: 91.3 FL (ref 80–96)
PLATELET # BLD AUTO: 182 10^3/UL (ref 150–450)
POTASSIUM SERPL-SCNC: 4.8 MEQ/L (ref 3.5–5.1)
PROT SERPL-MCNC: 6.8 GM/DL (ref 6.4–8.2)
RBC # BLD AUTO: 4.71 10^6/UL (ref 4–5.4)
SODIUM SERPL-SCNC: 139 MEQ/L (ref 136–145)
WBC # BLD AUTO: 8.6 10^3/UL (ref 4–10)

## 2020-07-08 RX ADMIN — INSULIN LISPRO SCH UNITS: 100 INJECTION, SOLUTION INTRAVENOUS; SUBCUTANEOUS at 18:09

## 2020-07-08 RX ADMIN — METOPROLOL TARTRATE SCH MG: 25 TABLET, FILM COATED ORAL at 13:28

## 2020-07-08 RX ADMIN — APIXABAN SCH MG: 5 TABLET, FILM COATED ORAL at 21:08

## 2020-07-08 RX ADMIN — SODIUM CHLORIDE, PRESERVATIVE FREE SCH ML: 5 INJECTION INTRAVENOUS at 06:17

## 2020-07-08 RX ADMIN — INSULIN LISPRO SCH UNITS: 100 INJECTION, SOLUTION INTRAVENOUS; SUBCUTANEOUS at 21:00

## 2020-07-08 RX ADMIN — INSULIN LISPRO SCH UNITS: 100 INJECTION, SOLUTION INTRAVENOUS; SUBCUTANEOUS at 13:29

## 2020-07-08 RX ADMIN — SODIUM CHLORIDE, PRESERVATIVE FREE SCH ML: 5 INJECTION INTRAVENOUS at 13:29

## 2020-07-08 RX ADMIN — ASPIRIN SCH MG: 81 TABLET ORAL at 08:43

## 2020-07-08 RX ADMIN — INSULIN LISPRO SCH UNITS: 100 INJECTION, SOLUTION INTRAVENOUS; SUBCUTANEOUS at 08:43

## 2020-07-08 RX ADMIN — SODIUM CHLORIDE, PRESERVATIVE FREE SCH ML: 5 INJECTION INTRAVENOUS at 21:09

## 2020-07-08 RX ADMIN — METOPROLOL TARTRATE SCH MG: 25 TABLET, FILM COATED ORAL at 18:10

## 2020-07-08 RX ADMIN — METOPROLOL TARTRATE SCH MG: 25 TABLET, FILM COATED ORAL at 06:17

## 2020-07-08 RX ADMIN — APIXABAN SCH MG: 5 TABLET, FILM COATED ORAL at 08:43

## 2020-07-08 RX ADMIN — METOPROLOL TARTRATE SCH MG: 25 TABLET, FILM COATED ORAL at 23:52

## 2020-07-08 NOTE — IPNPDOC
Text Note


Date of Service


The patient was seen on 7/8/20.





NOTE


SUBJECTIVE:


-No complaints, doing well. Pleasant.


-Noted to have paroxysms of Aflutter, SVT, NSR





OBJECTIVE:


VITAL SIGNS: see below


GENERAL APPEARANCE: Morbidly obese


HEENT: NCAT, PERRLA, EOMI, MMM


CARDIOVASCULAR: Regular this morning, no m/r/g noted


LUNGS: CTAB


ABDOMEN: Large abdominal hernia, nontender, obese, normoactive, soft


EXTREMITIES: has bilateral LE edema, WWP


NEUROLOGICAL: normal gait, AOx3, without dysarthria


PSYCHIATRIC: AOx3





LABORATORY DATA: Reviewed


WBC 8.6


Hgb 13.8


Platelets 182


Na 139


K 4.8


Cr 0.8


mag 2.1


TSH low, free T4 0.97


troponin negative





IMAGING: CXR without evidence of pathology, sharp angles, no masses, no 

adenopathy, no effusions, heart size wnl





MICROBIOLOGY: Please see below. 





ASSESSMENT: 


63 yo W with a history of medication non compliance, rarely sees doctors, 

reporting a history of abdominal hernia for which she did not follow through 

with surgery for repair, diagnosis of DM for which she takes no medications, 

GED, HTN, chronic back pain, osteoarthritis, and an extensive psychiatric 

history of schizophrenia, depression, anxiety and multiple admissions in the 

[facility] who was admitted to the [facility] for command auditory hallucinations to cause 

self harm whom on nursing examination on arrival to the [facility] was noted to have a

variable heart beat with variable heart rate with tachycardia up to 150s and 

found to be in Aflutter.








PLAN:


Paroxysmal atrial flutter:


-continue eliquis 5 BID


-TTE pending


-TSH low, freeT4 was wnl


-CXR without any pathology noted


-troponin negative


-EKG was with Aflutter without ST changes


-No noted clear source of infection, afebrile, normotensive, skin intact, no 

leukocytosis


-K and Mag wnl


-Cardiology consulted. Dr. Car will see her today





DM:


-SSI, FSBG AC/HS, hypoglycemia protocol





Audiotry hallucination:


-psych consult





DVT ppx: eliquis


Dispo: PCU. Will request transfer back to [facility] once medically cleared





VS,Fishbone, I+O


VS, Fishbone, I+O


Laboratory Tests


7/7/20 14:19








7/8/20 05:56











Vital Signs








  Date Time  Temp Pulse Resp B/P (MAP) Pulse Ox O2 Delivery O2 Flow Rate FiO2


 


7/8/20 06:17  64  125/66    


 


7/8/20 04:00 96.6  19  98 Room Air  














I&O- Last 24 Hours up to 6 AM 


 


 7/8/20





 06:00


 


Intake Total 720 ml


 


Output Total 700 ml


 


Balance 20 ml

















JACKI LYNN MD    Jul 8, 2020 07:17

## 2020-07-08 NOTE — CR
DATE OF CONSULTATION:  07/08/2020

 

INDICATION:  Atrial flutter.

 

HISTORY OF PRESENT ILLNESS:

Mrs. Zamorano is previously unknown to me.  She is a 64-year-old female who was

admitted yesterday for psychiatric admission, but during the intake it was noted

she was tachycardic and 12-lead ECG revealed atrial flutter with 2:1 conduction

and ventricular rate approximately 150 beats per minute.  She was moved to the

progressive care unit (PCU).  She received beta blockers and eventually 
converted

to sinus rhythm and has stayed in sinus rhythm since.  She tells me that she had

no awareness of the arrhythmia.  She did not feel any palpitations, tachycardia,

chest pain or shortness of breath.

 

She carries no prior history of cardiovascular disease, at least none that she

can recall.  She says that at her baseline she is relatively sedentary, but she

is able to ambulate without major difficulty.  Denies any chest pain.  There is

no history of syncope and near-syncope.  She does not recall that she would ever

have had any cardiac evaluation.

 

PAST MEDICAL HISTORY:

1.  Type 2 diabetes.

2.  Gastroesophageal reflux disease (GERD).

3.  Hypertension.

4.  Degenerative joint disease (DJD).

5.  Extensive history of psychiatric conditions that include depression, anxiety

and schizophrenia.

 

PAST SURGICAL HISTORY:  Positive for tubal ligation

 

SOCIAL HISTORY:

The patient quit smoking 3 years ago.  She does not drink alcohol.  She is

.  Mother of three children.

 

FAMILY HISTORY:  No longer relevant.  She does not have much information.

 

OUTPATIENT MEDICATIONS:  None.

 

ALLERGIES:  LEVOFLOXACIN.

 

PHYSICAL EXAMINATION:

Vital Signs:  Blood pressure 120/60.  Heart rate has been currently in 60s, but

during the flutter was 150.  She is afebrile.  Saturation 94% on room air.  She

weighs 127 kg.

She is currently alert, oriented and appropriate.

Her jugular venous pulse (JVP) is not high.

Lungs are reasonably clear.

Heart exam reveals very limited exam due to her obesity.  I do not appreciate 
any

gallop, rub or murmur, but I could barely hear her heart sounds.

Abdomen demonstrates significant hernia.  I do not appreciate any obvious

organomegaly, but again limited exam considering her body habitus.

Extremities have about 1+ edema.  There is stasis dermatitis extending to her

shins.  Peripheral pulses are palpable, but not of a good quality.

 

LABORATORIES:

Basic metabolic panel is normal, but for glucose of 163.  She had normal liver

function test.  Albumin is 3.1.  Lipid panel yesterday revealed total 
cholesterol

165, LDL 75, HDL 28 and triglycerides 311.  Her TSH was 0.275, slightly

depressed.

 

ECG on admission reveals presence of atrial flutter with 2:1 conduction and poor

R-wave progression.  It is a typical atrial flutter.  Chest x-ray is a portable

study, but I do not appreciate any gross evidence for pleural effusion or

congestive heart failure.

 

ASSESSMENT/PLAN:

Mrs. Zamorano is a 64-year-old female who presented with atrial flutter that was

detected essentially by chance.  She had no awareness of the arrhythmia.  She

spontaneously converted back to sinus rhythm.  At this point, I do believe that

she should be chronically anticoagulated because she has additional risk factors

for stroke if only her female sex and age.  She was already started on Eliquis,

which I think is a perfectly acceptable option and should be continued.  She

responded favorably to administration of beta blockers.  I would recommend to

continue metoprolol, probably just 25 mg twice a day chronically.  An

echocardiogram was requested, but provided she has preserved left ventricular

systolic function, which in my opinion is likely, I do not believe that any

further immediate testing evaluation will be necessary and I would not have any

objections that she returns back to the psychiatric floor.

GOLDEN

## 2020-07-08 NOTE — REP
REASON:  Dyspnea.

 

Latest prior for comparison is 05/24/2014.

 

The technique utilized in obtaining the radiograph has magnified the cardiac

silhouette and accentuated the interstitial markings.

 

There is cardiomegaly, which is mild, but accentuated by technique.  The lung

fields are clear and stable.  No acute patchy parenchymal opacities or pleural

effusions have developed.  There is no change in the osseous structures.

 

IMPRESSION:

 

No acute cardiopulmonary disease.

 

 

Electronically Signed by

Grady Lang DO 07/08/2020 09:27 A

## 2020-07-09 ENCOUNTER — HOSPITAL ENCOUNTER (INPATIENT)
Dept: HOSPITAL 53 - M PSY | Age: 65
LOS: 4 days | Discharge: HOME | DRG: 885 | End: 2020-07-13
Attending: PSYCHIATRY & NEUROLOGY | Admitting: PSYCHIATRY & NEUROLOGY
Payer: MEDICARE

## 2020-07-09 VITALS — SYSTOLIC BLOOD PRESSURE: 148 MMHG | DIASTOLIC BLOOD PRESSURE: 75 MMHG

## 2020-07-09 VITALS — DIASTOLIC BLOOD PRESSURE: 72 MMHG | SYSTOLIC BLOOD PRESSURE: 151 MMHG

## 2020-07-09 VITALS — DIASTOLIC BLOOD PRESSURE: 70 MMHG | SYSTOLIC BLOOD PRESSURE: 137 MMHG

## 2020-07-09 VITALS — SYSTOLIC BLOOD PRESSURE: 144 MMHG | DIASTOLIC BLOOD PRESSURE: 76 MMHG

## 2020-07-09 VITALS — DIASTOLIC BLOOD PRESSURE: 58 MMHG | SYSTOLIC BLOOD PRESSURE: 127 MMHG

## 2020-07-09 VITALS — BODY MASS INDEX: 44.5 KG/M2 | HEIGHT: 66 IN | WEIGHT: 276.9 LBS

## 2020-07-09 VITALS — DIASTOLIC BLOOD PRESSURE: 77 MMHG | SYSTOLIC BLOOD PRESSURE: 158 MMHG

## 2020-07-09 VITALS — DIASTOLIC BLOOD PRESSURE: 90 MMHG | SYSTOLIC BLOOD PRESSURE: 140 MMHG

## 2020-07-09 DIAGNOSIS — Z79.01: ICD-10-CM

## 2020-07-09 DIAGNOSIS — Z79.899: ICD-10-CM

## 2020-07-09 DIAGNOSIS — I10: ICD-10-CM

## 2020-07-09 DIAGNOSIS — F31.13: Primary | ICD-10-CM

## 2020-07-09 DIAGNOSIS — I48.92: ICD-10-CM

## 2020-07-09 DIAGNOSIS — K21.9: ICD-10-CM

## 2020-07-09 DIAGNOSIS — M54.9: ICD-10-CM

## 2020-07-09 DIAGNOSIS — K46.9: ICD-10-CM

## 2020-07-09 DIAGNOSIS — E11.9: ICD-10-CM

## 2020-07-09 DIAGNOSIS — M19.90: ICD-10-CM

## 2020-07-09 DIAGNOSIS — F20.9: ICD-10-CM

## 2020-07-09 DIAGNOSIS — Z88.8: ICD-10-CM

## 2020-07-09 LAB
ALBUMIN SERPL BCG-MCNC: 3.3 GM/DL (ref 3.2–5.2)
ALT SERPL W P-5'-P-CCNC: 23 U/L (ref 12–78)
BILIRUB SERPL-MCNC: 1.1 MG/DL (ref 0.2–1)
BUN SERPL-MCNC: 22 MG/DL (ref 7–18)
CALCIUM SERPL-MCNC: 8.4 MG/DL (ref 8.8–10.2)
CHLORIDE SERPL-SCNC: 105 MEQ/L (ref 98–107)
CO2 SERPL-SCNC: 27 MEQ/L (ref 21–32)
CREAT SERPL-MCNC: 0.79 MG/DL (ref 0.55–1.3)
GFR SERPL CREATININE-BSD FRML MDRD: > 60 ML/MIN/{1.73_M2} (ref 45–?)
GLUCOSE SERPL-MCNC: 160 MG/DL (ref 70–100)
HCT VFR BLD AUTO: 43.4 % (ref 36–47)
HGB BLD-MCNC: 13.6 G/DL (ref 12–15.5)
MAGNESIUM SERPL-MCNC: 2.1 MG/DL (ref 1.8–2.4)
MCH RBC QN AUTO: 28.5 PG (ref 27–33)
MCHC RBC AUTO-ENTMCNC: 31.3 G/DL (ref 32–36.5)
MCV RBC AUTO: 91 FL (ref 80–96)
PLATELET # BLD AUTO: 182 10^3/UL (ref 150–450)
POTASSIUM SERPL-SCNC: 4.3 MEQ/L (ref 3.5–5.1)
PROT SERPL-MCNC: 7.1 GM/DL (ref 6.4–8.2)
RBC # BLD AUTO: 4.77 10^6/UL (ref 4–5.4)
SODIUM SERPL-SCNC: 140 MEQ/L (ref 136–145)
WBC # BLD AUTO: 7.7 10^3/UL (ref 4–10)

## 2020-07-09 RX ADMIN — INSULIN LISPRO SCH UNITS: 100 INJECTION, SOLUTION INTRAVENOUS; SUBCUTANEOUS at 13:17

## 2020-07-09 RX ADMIN — INSULIN LISPRO SCH UNITS: 100 INJECTION, SOLUTION INTRAVENOUS; SUBCUTANEOUS at 08:44

## 2020-07-09 RX ADMIN — ASPIRIN SCH MG: 81 TABLET ORAL at 08:44

## 2020-07-09 RX ADMIN — SODIUM CHLORIDE, PRESERVATIVE FREE SCH ML: 5 INJECTION INTRAVENOUS at 05:50

## 2020-07-09 RX ADMIN — INSULIN LISPRO SCH UNITS: 100 INJECTION, SOLUTION INTRAVENOUS; SUBCUTANEOUS at 21:00

## 2020-07-09 RX ADMIN — INSULIN LISPRO SCH UNITS: 100 INJECTION, SOLUTION INTRAVENOUS; SUBCUTANEOUS at 18:05

## 2020-07-09 RX ADMIN — METOPROLOL TARTRATE SCH MG: 25 TABLET, FILM COATED ORAL at 05:49

## 2020-07-09 RX ADMIN — SODIUM CHLORIDE, PRESERVATIVE FREE SCH ML: 5 INJECTION INTRAVENOUS at 13:46

## 2020-07-09 RX ADMIN — APIXABAN SCH MG: 5 TABLET, FILM COATED ORAL at 08:44

## 2020-07-09 RX ADMIN — APIXABAN SCH MG: 5 TABLET, FILM COATED ORAL at 20:34

## 2020-07-09 RX ADMIN — INSULIN LISPRO SCH UNITS: 100 INJECTION, SOLUTION INTRAVENOUS; SUBCUTANEOUS at 20:34

## 2020-07-09 NOTE — IPN
DATE:  07/09/2020

 

Ms. Zamorano has not had any problems since yesterday.  She was able to ambulate on

telemetry without difficulty.  Telemetry monitoring continues to reveal sinus

rhythm.  There has not been any relapse of atrial flutter.

 

Vital Signs:  Blood pressure 128/77.  Heart rate has been in 50s to 60s.  She is

afebrile.  Saturation is 100% on room air.  Weight remains unchanged at 127.1 kg.

 

Lungs are clear.

Heart exam reveals a regular rhythm without obvious gallop or rub.

Abdomen is obese, but soft.

No edema other than stasis dermatitis changes on her shin.

Neurologically, she is intact.

 

LABORATORIES:

Normal CBC.  Normal basic metabolic panel, but for glucose of 160.

 

ASSESSMENT/PLAN:

Mrs. Zamorano is a 64-year-old female who came to the hospital with a psychiatric

diagnosis and was found to have tachycardia that represented atrial flutter with

rapid ventricular response (RVR).  She converted to sinus rhythm after

administration of metoprolol and has maintained sinus rhythm since.  Her

echocardiogram yesterday revealed preserved left ventricular systolic function

and no significant valvular disease.  At this point, she can go back to the

psychiatric arambula and be taken off telemetry.  She received all her doses of beta

blockers and consequently I believe we can put her on metoprolol 50 twice a day

and continue Eliquis 5 twice a day.  I would discontinue the aspirin.  I

tentatively plan to see her in the office in a few weeks.  Unfortunately, she has

virtually no social support and tells me that she does not have any

transportation and she does not see a primary care physician on a regular basis.

All of these things should be arranged.  I do recommend that  are

involved because without established outpatient care she will be back very soon.

I am going to sign off her service.

## 2020-07-09 NOTE — ECHO
DATE OF STUDY:  07/08/2020

YOB: 1955

AGE:  64

ACCOUNT #:  [id number]

REFERRING PROVIDER:  Dr. Khan

PATIENT LOCATION:  Room 3222

REASON FOR STUDY:  Abnormal EKG.

 

2-D MEASUREMENTS:

IVS:  1.1 cm

LV:  4.3 cm

LVPW:  1.1 cm

LA:  4.1 cm

Aorta:  2.6 cm

 

DOPPLER MEASUREMENTS:

Peak velocity across the aortic valve:  1.7 m/s

Peak velocity across the LVOT:  1.1 m/s

Mitral E:  0.88

Mitral A:  0.77

Ratio:  1.1

Maximum tricuspid valve velocity:  2.5 m/s

 

2-D COMMENTS:

1.  Technically limited study due to poor acoustic window.

2.  The left ventricular size is normal with a normal left ventricular wall

thickness and systolic function.  The estimated left ventricular systolic

ejection fraction is 60-65%.

3. Mildly dilated left atrium.  The right atrium appeared to be normal in limited

views.  Normal right ventricle.

4.  The atrial septum appeared to be normal without evidence of defect or shunt.

5.  Normal aortic root.

6.  No pericardial effusion seen.

7.  Mildly calcified aortic valve with normal leaflet excursion.  Mildly

calcified mitral annulus with normal anterior mitral valve leaflet motion.

Normal tricuspid valve.  The pulmonic valve and proximal pulmonary artery

branches were not well visualized.

8.  The inferior vena cava appeared to be normal in size, but in limited views.

 

DOPPLER:

It detects trace mitral regurgitation and mild tricuspid regurgitation.  The

calculated pulmonary artery systolic pressure varies between 30-40 mmHg.

Assessment of the left ventricular diastolic function was normal.

 

IMPRESSIONS:

1.  Technically limited study due to poor acoustic window.

2.  Normal global left ventricular systolic function.  Assessment of the left

ventricular diastolic function appeared to be normal.

3.  Aortic valve sclerosis without stenosis or aortic regurgitation.

4.  Mitral annulus calcification with trace mitral regurgitation and a mildly

enlarged left atrium.

5.  Mild tricuspid regurgitation with probably mild pulmonary hypertension.

## 2020-07-09 NOTE — MHIPNPDOC
[facility] Progress Note


Progress Note


DATE OF SERVICE: 7/9/20





HISTORY:  Asper previous records: "63 yo W with a history of medication non 

compliance, rarely sees doctors, reporting a history of abdominal hernia for 

which she did not follow through with surgery for repair, diagnosis of DM for 

which she takes no medications, GED, HTN, chronic back pain, osteoarthritis, and

an extensive psychiatric history of schizophrenia, depression, anxiety and 

multiple admissions in the [facility] who was admitted to the [facility] today for command 

audiotry hallucinations telling her to cause self harm whom on nursing 

examination on arrival to the [facility] was noted to have a variable heart beat with 

variable heart rate with tachyardia up to 150s. The nurse requested a medicine 

consult and on stat EKG she had atrial flutter with a rate of 156. She was 

otherwise asymptomatic, normotensive and reported that she sometimes has 

palpitations but never worries about it because it does not bother her. She 

otherwise denied a history of a stroke, asymmetrical weakness, dyspnea, chest 

pain, abdominal pain, fever, chills, LE swelling, orthopnea or paroxysmal 

nocturnal dyspnea. I decided to admit her to medicine for newly noted aftrial 

flutter. On arrival, I initially admitted her to med/surg with tele but her RVR 

continued and I started her on metop 25 Q6H and she was transferred to the PCU 

boarding in the ICU just incase she would require IV medication for rate 

control. She ultimately broke and went back in to sinus. In the meantime, I 

started her on eliquis and consulted Dr. Car."





VITAL SIGNS: See below.





NEW TEST RESULTS: See below





CURRENT MEDICATIONS: See below.





MENTAL STATUS EXAMINATION:


Patient is a 64-year old female, who is alert, cooperative, dressed in hospital 

clothes, wearing a face mask.


Speech: Is tangential, with normal tone, rate, rhythm and volume.


Thought processes including: disorganized, tangential


Thought content: paranoid thoughts, she believes other people want to control 

what she does,people are always aware of what she does even when they are not 

with her 


Abstract reasoning, and computation: impaired.


 Description of associations: loose.


Description of abnormal or psychotic thoughts: She has paranoid thoughts, b

elieves people that are not physically present are out to control her life and 

what she does. She reports auditory hallucinations of voices telling her "nasty 

things". Reports angry thoughts about people controlling her life. she denies SI

but she says she has had HI towards


the people that she feels are controlling her life.


Judgment: poor.


Insight: poor.


Orientation: to place and person but not fully oriented to date and time..


Attention span and concentration: 


Fund of knowledge: unable to asses


Mood: mildly anxious. Affect: congruent with mood, constricted.





DIAGNOSES:


1. Paranoid schizophrenia.


2. Atrial flutter


3. GED, 


4. HTN, 


5. chronic back pain


6. osteoarthritis,


 


ASSESSMENT: the patient is psychotic, very paranoid, she  implies that she has 

homicidal ideation towards people whose name she won't disclose because she 

thinks they always know what she is doing, they know if she goes to the grocery 

store even when they have not been with her and then, they tell her she 

shouldn't be spending all that money. She says she feels extremely annoyed about

being controlled and she feels unsafe when at home. her speech and thought 

process are disorganized and tangential, she needs to be discharged to the In

patient Mental Health Unit so that she  can be treated for her psychiatric 

illness, for her safety, other people's safety and stabilization.





MANAGEMENT PLAN: Discharge to [facility]





TIME SPENT: 30  minutes.





Vital Signs





Vital Signs








  Date Time  Temp Pulse Resp B/P (MAP) Pulse Ox O2 Delivery O2 Flow Rate FiO2


 


7/9/20 16:00 98.0 62 18 144/76 (98) 100 Room Air  











Laboratory Data


24H Labs


Laboratory Tests 2


7/8/20 21:02: Bedside Glucose (Misc Panel) 156H


7/9/20 05:30: 


Nucleated Red Blood Cells % (auto) 0.0, Anion Gap 8, Glomerular Filtration Rate 

> 60.0, Calcium Level 8.4L, Magnesium Level 2.1, Total Bilirubin 1.1H, Aspartate

Amino Transf (AST/SGOT) 19, Alanine Aminotransferase (ALT/SGPT) 23, Alkaline 

Phosphatase 61, Total Protein 7.1, Albumin 3.3, Albumin/Globulin Ratio 0.9L


7/9/20 11:34: Bedside Glucose (Misc Panel) 142H


7/9/20 17:05: Bedside Glucose (Misc Panel) 138H


CBC/BMP


Laboratory Tests


7/9/20 05:30











Current Medications





Current Medications








 Medications


  (Trade)  Dose


 Ordered  Sig/Huan


 Route


 PRN Reason  Start Time


 Stop Time Status Last Admin


Dose Admin


 


 Acetaminophen


  (Tylenol Tab)  650 mg  Q4H  PRN


 PO


 PAIN OR FEVER  7/7/20 13:00


     





 


 Apixaban


  (Eliquis)  5 mg  BID


 PO


   7/7/20 16:00


    7/9/20 08:44





 


 Aspirin


  (Ecotrin)  81 mg  DAILY


 PO


   7/8/20 09:00


 7/9/20 08:51 DC 7/9/20 08:44





 


 Dextrose


  (Dextrose 50%)  25 ml  ASDIRECTED  PRN


 IV


 SEE LABEL COMMENTS  7/7/20 17:45


     





 


 Glucagon


  (Glucagon)  1 mg  ASDIRECTED  PRN


 SC


 SEE LABEL COMMENTS  7/7/20 17:45


     





 


 Glucose


  (Glucose)  16 GM  ASDIRECTED  PRN


 PO


 SEE LABEL COMMENTS  7/7/20 17:45


     





 


 Home Med


  (Med Rec


 Complete!)    ASDIRECTED


 XX


   7/7/20 14:15


 7/7/20 14:13 DC  





 


 Insulin Human


 Lispro


  (HumaLOG INSULIN)  See


 Protocol


 Table  AC


 SC


   7/7/20 17:30


    7/9/20 18:05





 


 Insulin Human


 Lispro


  (HumaLOG INSULIN)  See


 Protocol


 Table  QHS


 SC


   7/7/20 21:00


     





 


 Metoprolol


 Tartrate


  (Lopressor)  25 mg  BID


 PO


   7/7/20 14:00


 7/7/20 14:34 DC 7/7/20 13:58





 


 Metoprolol


 Tartrate


  (Lopressor)  25 mg  Q6H


 PO


   7/7/20 18:00


 7/9/20 08:51 DC 7/9/20 05:49





 


 Metoprolol


 Tartrate


  (Lopressor)  50 mg  BID


 PO


   7/9/20 21:00


     





 


 Miscellaneous


  (Unresolved


 Clarification


 Entry)  SEE LABEL


 COMMENTS  DAILY


 XX


   7/7/20 09:00


   Cancel  





 


 Sodium Chloride


  (Saline Lock


 Flush)  2 ml  ASDIRECTED  PRN


 IV


 SEE LABEL COMMENTS  7/8/20 03:30


     





 


 Sodium Chloride


  (Saline Lock


 Flush)  2 ml  SLF


 IV


   7/8/20 06:00


    7/9/20 13:46














Allergies


Coded Allergies:  


     levofloxacin (Verified  Adverse Reaction, Unknown, DIARRHEA, 7/7/20)











DUSTIN AHMADI MD              Jul 9, 2020 19:09

## 2020-07-09 NOTE — DS.PDOC
Discharge Summary


General


Date of Admission


Jul 7, 2020 at 13:25


Date of Discharge


7/9/2020


Attending Physician:  JACKI LYNN MD





Discharge Summary


PROCEDURES PERFORMED DURING STAY: None





ADMITTING DIAGNOSES: 


1. Atrial flutter





DISCHARGE DIAGNOSES:


1. paroxysmal atrial flutter


2. abdominal hernia for which she did not follow through with surgery for 

repair, not causing her any trouble at this time


3. diagnosis of DM for which she takes no medications


4. GERD


5. HTN


6. chronic back pain


7. osteoarthritis


8. extensive psychiatric history of schizophrenia, depression, anxiety recently 

admitted to [facility] for auditory command hallucinations 





COMPLICATIONS/CHIEF COMPLAINT: Atrial Flutter By Electrocardiogram.





HISTORY OF PRESENT ILLNESS: 


63 yo W with a history of medication non compliance, rarely sees doctors, 

reporting a history of an abdominal hernia for which she did not follow through 

with surgery for repair, diagnosis of DM for which she takes no medications, 

GERD, HTN, chronic back pain, osteoarthritis, and an extensive psychiatric 

history of schizophrenia, depression, anxiety and multiple admissions in the 

[facility] who was admitted to the [facility] for command auditory hallucinations telling 

her to cause self harm, whom on nursing examination on arrival to the [facility] was 

noted to have a variable heart beat with variable heart rate with tachycardia up

to 150s. The nurse requested a medicine consult and on stat EKG she had atrial 

flutter with a rate of 156. She was otherwise asymptomatic, normotensive and 

reported that she sometimes has palpitations but never worries about it because 

it does not bother her. She otherwise denied a history of a stroke, asymmetrical

weakness, dyspnea, chest pain, abdominal pain, fever, chills, LE swelling, 

orthopnea or paroxysmal nocturnal dyspnea. I decided to admit her to medicine 

for newly noted atrial flutter. 





HOSPITAL COURSE: 


On arrival, I initially admitted her to med/surg with tele but her RVR continued

and I started her on metop 25 Q6H and she was transferred to the PCU just incase

she would require IV medication for rate control. She ultimately broke and went 

back in to sinus. In the meantime, I started her on eliquis and consulted Dr. Car who recommended continuation of anticoagulation and rate control. TSH was

low with a normal free T4, while EKG was non ischemic, troponin negative, lipid 

panel showed mild hypertriglyceridemia, CXR was wnl without acute pathology and 

TTE showed a normal EF. She is now medically cleared and being transferred back 

to inpatient mental health for evaluation and treatment of her ongoing auditory 

hallucinations.





DISCHARGE MEDICATIONS: Please see below.


 


ALLERGIES: Please see below.





PHYSICAL EXAMINATION ON DISCHARGE:


VITAL SIGNS: Please see below.


GENERAL APPEARANCE: Morbidly obese


HEENT: NCAT, PERRLA, EOMI, MMM


CARDIOVASCULAR: Regular rhythm and rate, no m/r/g noted


LUNGS: CTAB


ABDOMEN: Large abdominal hernia, nontender, obese, normoactive, soft


EXTREMITIES: has bilateral LE edema, WWP


NEUROLOGICAL: normal gait, AOx3, without dysarthria


PSYCHIATRIC: AOx3





LABORATORY DATA: Please see below.





IMAGING: 


CXR: no acute pathology noted.


TTE: normal EF per my discussion with Dr. Car. Official read pending.





PROGNOSIS: Good





ACTIVITY: As tolerated





DIET: consistent carbohydrate and 2g sodium diet





DISCHARGE PLAN: [facility]





DISPOSITION: [facility]





DISCHARGE INSTRUCTIONS:


1. Please follow up with Dr. Car (cardiology) and PCP on discharge.





ITEMS TO FOLLOWUP ON ON OUTPATIENT:


1. paroxysmal atrial dysrhythmias 





DISCHARGE CONDITION: Stable





TIME SPENT ON DISCHARGE: 40 minutes.





Vital Signs/I&Os





Vital Signs








  Date Time  Temp Pulse Resp B/P (MAP) Pulse Ox O2 Delivery O2 Flow Rate FiO2


 


7/9/20 05:49  74  151/72    


 


7/9/20 04:00 96.3  18  95 Room Air  














I&O- Last 24 Hours up to 6 AM 


 


 7/9/20





 06:00


 


Intake Total 1140 ml


 


Output Total 350 ml


 


Balance 790 ml











Laboratory Data


Labs 24H


Laboratory Tests 2


7/8/20 12:11: Bedside Glucose (Misc Panel) 119H


7/8/20 17:20: Bedside Glucose (Misc Panel) 189H


7/8/20 21:02: Bedside Glucose (Misc Panel) 156H


7/9/20 05:30: 


Nucleated Red Blood Cells % (auto) 0.0, Anion Gap 8, Glomerular Filtration Rate 

> 60.0, Calcium Level 8.4L, Magnesium Level 2.1, Total Bilirubin 1.1H, Aspartate

Amino Transf (AST/SGOT) 19, Alanine Aminotransferase (ALT/SGPT) 23, Alkaline 

Phosphatase 61, Total Protein 7.1, Albumin 3.3, Albumin/Globulin Ratio 0.9L


CBC/BMP


Laboratory Tests


7/9/20 05:30








FSBS





Laboratory Tests








Test


 7/8/20


12:11 7/8/20


17:20 7/8/20


21:02 Range/Units


 


 


Bedside Glucose (Misc Panel) 119 189 156   MG/DL











Discharge Medications


Scheduled PRN


Aspirin/Acetaminophen/Caffeine (Headache Relief Tablet) 1 Tab Tab, 1 TAB PO Q6H 

PRN for HEADACHE, (Reported)





Allergies


Coded Allergies:  


     levofloxacin (Verified  Adverse Reaction, Unknown, DIARRHEA, 7/7/20)











JACKI LYNN MD    Jul 9, 2020 06:44

## 2020-07-10 VITALS — DIASTOLIC BLOOD PRESSURE: 73 MMHG | SYSTOLIC BLOOD PRESSURE: 126 MMHG

## 2020-07-10 VITALS — SYSTOLIC BLOOD PRESSURE: 125 MMHG | DIASTOLIC BLOOD PRESSURE: 64 MMHG

## 2020-07-10 RX ADMIN — INSULIN LISPRO SCH UNITS: 100 INJECTION, SOLUTION INTRAVENOUS; SUBCUTANEOUS at 21:00

## 2020-07-10 RX ADMIN — ASPIRIN SCH MG: 81 TABLET ORAL at 09:31

## 2020-07-10 RX ADMIN — METOPROLOL TARTRATE SCH MG: 50 TABLET, FILM COATED ORAL at 21:53

## 2020-07-10 RX ADMIN — INSULIN LISPRO SCH UNITS: 100 INJECTION, SOLUTION INTRAVENOUS; SUBCUTANEOUS at 06:54

## 2020-07-10 RX ADMIN — INSULIN LISPRO SCH UNITS: 100 INJECTION, SOLUTION INTRAVENOUS; SUBCUTANEOUS at 17:07

## 2020-07-10 RX ADMIN — APIXABAN SCH MG: 5 TABLET, FILM COATED ORAL at 21:52

## 2020-07-10 RX ADMIN — METOPROLOL TARTRATE SCH MG: 50 TABLET, FILM COATED ORAL at 09:31

## 2020-07-10 RX ADMIN — APIXABAN SCH MG: 5 TABLET, FILM COATED ORAL at 09:31

## 2020-07-10 RX ADMIN — INSULIN LISPRO SCH UNITS: 100 INJECTION, SOLUTION INTRAVENOUS; SUBCUTANEOUS at 12:08

## 2020-07-10 NOTE — HPEPDOC
[facility] Medical History & Physical


Date of Admission


Jul 9, 2020


Date of Service:  Jul 10, 2020


Attending Physician:  JACKI LYNN MD





History and Physical


CHIEF COMPLAINT: Command auditory hallucinations





HISTORY OF PRESENT ILLNESS: 


65 yo W with a history of medication non compliance, rarely sees doctors, 

reporting a history of an abdominal hernia for which she did not follow through 

with surgery for repair, diagnosis of DM for which she takes no medications, 

GERD, HTN, chronic back pain, osteoarthritis, and an extensive psychiatric 

history of schizophrenia, depression, anxiety and multiple admissions in the 

[facility] who was originally admitted to the [facility] for command auditory hallucinations

on 7/7/2020, whom on nursing examination on arrival to the [facility] was noted to 

have a variable heart beat with variable heart rate with tachycardia up to 150s.

The nurse requested a medicine consult and on stat EKG she had atrial flutter 

with a rate of 156. She was otherwise asymptomatic, normotensive and reported 

that she sometimes has palpitations but never worries about it because it does 

not bother her. She otherwise denied a history of a stroke, asymmetrical 

weakness, dyspnea, chest pain, abdominal pain, fever, chills, LE swelling, 

orthopnea or paroxysmal nocturnal dyspnea. I decided to admit her to medicine 

for newly noted atrial flutter. While on the medicine unit, she was started on 

metoprol and she ultimately broke and went back in to sinus and was also started

on eliquis and consulted Dr. Car who recommended continuation of anticoagula

tion and rate control. TSH was low with a normal free T4, while EKG was non 

ischemic, troponin negative, lipid panel showed mild hypertriglyceridemia, CXR 

was wnl without acute pathology and TTE showed a normal EF. She was medically 

cleared and transferred back to inpatient mental health on 7/9/2020 for 

evaluation and treatment of her ongoing auditory hallucinations. At this time s

he is medically stable and medicine is consulted for H&P.





PAST MEDICAL HISTORY:


1. paroxysmal atrial flutter on eliquis


2. abdominal hernia for which she did not follow through with surgery for 

repair, not causing her any trouble at this time


3. diagnosis of DM for which she takes no medications


4. GERD


5. HTN


6. chronic back pain


7. osteoarthritis


8. extensive psychiatric history of schizophrenia, depression, anxiety recently 

admitted to [facility] for auditory command hallucinations 





PAST SURGICAL HISTORY: Tubal ligation





SOCIAL HISTORY:


Tobacco use: NO


ETOH: NO


Illicit drug use: NO





FAMILY HISTORY: Non contributory





ALLERGIES: Please see below.





REVIEW OF SYSTEMS:12 point ROS was reviewed and all the pertinent positives are 

noted in the HPI above and was otherwise negative.





HOME MEDICATIONS: Please see below. Only took aspirin for headaches before 

7/7/2020 [facility] admission





PHYSICAL EXAMINATION:


VITAL SIGNS: Please see below.


GENERAL APPEARANCE: Morbidly obese


HEENT: NCAT, PERRLA, EOMI, MMM


CARDIOVASCULAR: Regular rhythm and rate, no m/r/g noted


LUNGS: CTAB


ABDOMEN: Large abdominal hernia, nontender, obese, normoactive, soft


EXTREMITIES: has bilateral LE edema, WWP


NEUROLOGICAL: normal gait, AOx3, without dysarthria


PSYCHIATRIC: AOx3





LABS: No new labs. Reviewed prior





ASSESSMENT:


65 yo W with a history of medication non compliance, rarely sees doctors, 

reporting a history of abdominal hernia for which she did not follow through 

with surgery for repair, diagnosis of DM for which she takes no medications, 

GED, HTN, chronic back pain, osteoarthritis, and an extensive psychiatric 

history of schizophrenia, depression, anxiety and multiple admissions in the 

[facility] who was admitted to the [facility] for command auditory hallucinations and 

incidentally found to be in Aflutter, requiring brief transfer to internal 

medicine inpatient for medical optimization, who is now medically cleared and 

optimized, now back in the [facility] for psychiatric evaluation and treatment. 

Medicine will sign off at this time.





PLAN:


Paroxysmal atrial flutter:


-continue eliquis 5 BID


-continue metoprolol 50 BID





DM:


-SSI, FSBG AC/HS, hypoglycemia protocol





History of schizophrenia, depression, anxiety with ongoing auditory 

hallucinations:


-Per psych team





DVT ppx: eliquis
































PHYSICAL EXAMINATION:


VITAL SIGNS: Temperature , pulse , respiratory rate , blood pressure , pulse 

oximetry % on room air.


GENERAL APPEARANCE: . 


HEENT: .


CARDIOVASCULAR: .


LUNGS: .


ABDOMEN: .


MUSCULOSKELETAL: .


EXTREMITIES: .


NEUROLOGICAL: .


PSYCHIATRIC: .





LABORATORY DATA: See below.





IMAGING: 





MICROBIOLOGY: Please see below. 





ASSESSMENT: .


.


PLAN:


1. .





Vital Signs





Vital Signs








  Date Time  Temp Pulse Resp B/P (MAP) Pulse Ox O2 Delivery O2 Flow Rate FiO2


 


7/10/20 07:19 97.9 63 15 126/73 (90) 95 Room Air  











Laboratory Data


Labs 24H


Laboratory Tests 2


7/10/20 06:43: Bedside Glucose (Misc Panel) 154H





Home Medications


Scheduled


Apixaban (Eliquis) 5 Mg Tablet, 5 MG PO BID


Metoprolol Tartrate (Lopressor) 50 Mg Tablet, 50 MG PO BID





Scheduled PRN


Aspirin/Acetaminophen/Caffeine (Headache Relief Tablet) 1 Tab Tab, 1 TAB PO Q6H 

PRN for HEADACHE





Allergies


Coded Allergies:  


     levofloxacin (Verified  Adverse Reaction, Unknown, DIARRHEA, 7/7/20)





A-FIB/CHADSVASC


A-FIB History


Current/History of A-Fib/PAF?:  Yes


Current PO Anticoag Therapy:  Yes











JACKI LYNN MD   Jul 10, 2020 07:39

## 2020-07-11 VITALS — SYSTOLIC BLOOD PRESSURE: 110 MMHG | DIASTOLIC BLOOD PRESSURE: 72 MMHG

## 2020-07-11 VITALS — DIASTOLIC BLOOD PRESSURE: 82 MMHG | SYSTOLIC BLOOD PRESSURE: 146 MMHG

## 2020-07-11 VITALS — SYSTOLIC BLOOD PRESSURE: 122 MMHG | DIASTOLIC BLOOD PRESSURE: 62 MMHG

## 2020-07-11 RX ADMIN — INSULIN LISPRO SCH UNITS: 100 INJECTION, SOLUTION INTRAVENOUS; SUBCUTANEOUS at 06:52

## 2020-07-11 RX ADMIN — INSULIN LISPRO SCH UNITS: 100 INJECTION, SOLUTION INTRAVENOUS; SUBCUTANEOUS at 12:07

## 2020-07-11 RX ADMIN — METOPROLOL TARTRATE SCH MG: 50 TABLET, FILM COATED ORAL at 20:47

## 2020-07-11 RX ADMIN — ASPIRIN SCH MG: 81 TABLET ORAL at 08:06

## 2020-07-11 RX ADMIN — APIXABAN SCH MG: 5 TABLET, FILM COATED ORAL at 20:46

## 2020-07-11 RX ADMIN — APIXABAN SCH MG: 5 TABLET, FILM COATED ORAL at 08:06

## 2020-07-11 RX ADMIN — METOPROLOL TARTRATE SCH MG: 50 TABLET, FILM COATED ORAL at 08:06

## 2020-07-11 RX ADMIN — INSULIN LISPRO SCH UNITS: 100 INJECTION, SOLUTION INTRAVENOUS; SUBCUTANEOUS at 17:01

## 2020-07-11 RX ADMIN — INSULIN LISPRO SCH UNITS: 100 INJECTION, SOLUTION INTRAVENOUS; SUBCUTANEOUS at 20:47

## 2020-07-11 NOTE — MHIPNPDOC
[facility] Progress Note


Progress Note


DATE OF SERVICE: 7/11/20


HPI:


Velma presents today for a follow up. She saw a psychiatrist yesterday in the 

office. Velma previously had audio hallucinations, which have improved at this 

time. She denies any suicidal thoughts at this time.





MEDICATIONS:


Currently taking Abilify and received a shot of the 400 mg recently. She denies 

any side effects, such as shakiness and jitteriness.


Objective


Behavior: Somewhat bizzare although amenable and friendly.





Speech: Fluid.





Thought Form: Somewhat tangential. Some psychotic beliefs found.





Judgement: Improving although restricted at times.





Insight: Improving although restricted at times.


Assessment


Unspecified Psychotic disorder





Plan


Continue Abilify 10 mg daily.


Injection of 400 mg Abilify has been given.


Monitor for possible discharge on Monday or Tuesday.





Vital Signs





Vital Signs








  Date Time  Temp Pulse Resp B/P (MAP) Pulse Ox O2 Delivery O2 Flow Rate FiO2


 


7/11/20 08:07  66  122/62 (82)    


 


7/11/20 06:13 97.5  18     


 


7/10/20 07:19     95 Room Air  











Laboratory Data


24H Labs


Laboratory Tests 2


7/10/20 12:04: Bedside Glucose (Misc Panel) 131H


7/10/20 17:04: Bedside Glucose (Misc Panel) 177H


7/10/20 21:49: Bedside Glucose (Misc Panel) 186H


7/11/20 06:09: Bedside Glucose (Misc Panel) 179H





Current Medications





Current Medications








 Medications


  (Trade)  Dose


 Ordered  Sig/Huan


 Route


 PRN Reason  Start Time


 Stop Time Status Last Admin


Dose Admin


 


 Acetaminophen


  (Tylenol Tab)  650 mg  Q6HP  PRN


 PO


 HEADACHE or DISCOMFORT  7/9/20 20:15


     





 


 Al Hydrox/Mg


 Hydrox/Simethicone


  (Mylanta)  30 ml  Q4HP  PRN


 PO


 HEARTBURN/INDIGESTION  7/9/20 20:15


     





 


 Apixaban


  (Eliquis)  5 mg  BID


 PO


   7/10/20 09:00


    7/11/20 08:06





 


 Aripiprazole


  (AbiLIFY)  10 mg  QHS


 PO


   7/10/20 21:00


    7/10/20 21:52





 


 Aspirin


  (Ecotrin)  81 mg  DAILY


 PO


   7/10/20 09:00


    7/11/20 08:06





 


 Dextrose


  (Dextrose 50%)  25 ml  ASDIRECTED  PRN


 IV


 SEE LABEL COMMENTS  7/9/20 20:30


     





 


 Glucagon


  (Glucagon)  1 mg  ASDIRECTED  PRN


 SC


 SEE LABEL COMMENTS  7/9/20 20:30


     





 


 Glucose


  (Glucose)  16 GM  ASDIRECTED  PRN


 PO


 SEE LABEL COMMENTS  7/9/20 20:30


     





 


 Insulin Human


 Lispro


  (HumaLOG INSULIN)  See


 Protocol


 Table  AC


 SC


   7/10/20 07:30


    7/11/20 06:52





 


 Insulin Human


 Lispro


  (HumaLOG INSULIN)  See


 Protocol


 Table  QHS


 SC


   7/9/20 21:00


     





 


 Magnesium


 Hydroxide


  (Milk Of


 Magnesia)  30 ml  DAILYPRN  PRN


 PO


 CONSTIPATION  7/9/20 20:15


     





 


 Metoprolol


 Succinate


  (TopROL XL)  50 mg  BID


 PO


   7/9/20 21:00


   UNV  





 


 Metoprolol


 Tartrate


  (Lopressor)  50 mg  BID


 PO


   7/10/20 09:00


    7/11/20 08:06





 


 Olanzapine


  (ZyPREXA


 **ZYDIS**)  5 mg  Q6HP  PRN


 PO


 AGITATION  7/9/20 20:15


    7/11/20 00:49





 


 Trazodone HCl


  (Desyrel)  50 mg  QHSP  PRN


 PO


 INSOMNIA  7/9/20 20:15


    7/11/20 00:49














Allergies


Coded Allergies:  


     levofloxacin (Verified  Adverse Reaction, Unknown, DIARRHEA, 7/7/20)











RAMON PIERRE DO              Jul 11, 2020 11:13

## 2020-07-11 NOTE — MHHPE
DATE OF ADMISSION:  07/09/2020

 

DATE OF EVALUATION:  07/10/2020

 

HISTORY OF PRESENT ILLNESS:  This is a 64-year-old woman with a longstanding

history of treatment for schizophrenia and with a longstanding history of

noncompliance with treatment.  She first presented to the emergency room and was

admitted to [facility] 07/07/2020.

According to the emergency room records, she was quite disorganized, rambling

incoherently, stringing unrelated words together, talking about someone

constantly observing her in her apartment and "televisions in the air" and there

was really no way to get any significant history from her.  So, it turns out 
that

she got admitted to the psychiatric unit.  Then, it turns out that right away 
she

started to have a variable heart rate with tachycardia and a medicine consult 
was

requested and on stat EKG she had atrial flutter with a rate of 156 and she was

transferred to the medical service.  Then, yesterday, she was transferred back 
to

the psychiatric unit.  While she was on the medical service, she was seen by Dr. Dixon on 07/09/2020 and she describes the patient as paranoid, believing others

want to control what she does, that people are always aware of what she does 
even

if they are not with her, and she was talking about hearing voices telling her

"nasty things."

 

When I see the patient today, she tells me "I keep hearing voices," but she was

very guarded about the content of the voices today.  She also tells me that "I'm

told that some of my dead relatives have been seen in [place]."

 

PAST PSYCHIATRIC HISTORY:  As I said, she has a history of treatment for

schizophrenia and noncompliance with treatment.  She tells me she has never made

any suicidal attempt.  I was able to see the records from her hospitalization in

2018 and at that time she was discharged on Abilify 20 mg twice a day but it

sounds like she never complied with doing any followup after that.  The patient

says she has not been taking any psychiatric medications now.

 

FAMILY HISTORY:  She denies any psychiatric illness in the family or any

suicides.

 

MEDICAL HISTORY:  She says her only medical problems are that she has migraine

headaches and she gets pain in her knees sometimes, but when I saw Dr. Dixon's

consult on 07/09/2020, it mentioned that the patient had a history of diabetes

for which she takes no medications, gastroesophageal reflux disease (GERD),

hypertension.

 

ABUSE HISTORY:  She denies any history of any physical or sexual abuse.

 

SUBSTANCE ABUSE:  She does have a history of heavily abusing alcohol but has not

drank in many years.  She denies any history of abusing any drugs.



REVIEW OF SYSTEMS:

Vital signs:  Blood pressure is 126/73, pulse 63, respiration rate is 15.

Appearance:  She did not appear in any apparent distress.

All other systems were reviewed and found to be negative except for her 
complaint

of pain in her knees.

 

MENTAL STATUS EXAMINATION:  The patient was alert, she was oriented times three.

She was cooperative.  She was wearing her face mask, dressed in hospital

clothing.  She was not spontaneous, she would answer my questions with simple 
one

or two word answers.  There was no formal thought disorder noted.  She said that

her mood was "okay."  Affect was flat.  Definitely has auditory hallucinations

but she would not tell me the content today and it sounds like she definitely 
has

some paranoid delusions about people always watching her.



DIAGNOSIS:

Schizophrenia.

 

TREATMENT PLAN:

I did see when she was admitted in 2018 that she was discharged on Abilify 20 mg

twice a day.  I am going to start her on Abilify 10 mg nightly.  The patient has

agreed to start on Abilify Maintena injection 400 mg intramuscular (IM).  

I discussed the risk, benefit, and alternative treatment plan with the patient 

and she was in agreement with the treatment plan to start her on the Abilify.

GOLDEN

## 2020-07-12 VITALS — SYSTOLIC BLOOD PRESSURE: 142 MMHG | DIASTOLIC BLOOD PRESSURE: 82 MMHG

## 2020-07-12 VITALS — DIASTOLIC BLOOD PRESSURE: 66 MMHG | SYSTOLIC BLOOD PRESSURE: 130 MMHG

## 2020-07-12 RX ADMIN — INSULIN LISPRO SCH UNITS: 100 INJECTION, SOLUTION INTRAVENOUS; SUBCUTANEOUS at 20:58

## 2020-07-12 RX ADMIN — INSULIN LISPRO SCH UNITS: 100 INJECTION, SOLUTION INTRAVENOUS; SUBCUTANEOUS at 12:05

## 2020-07-12 RX ADMIN — INSULIN LISPRO SCH UNITS: 100 INJECTION, SOLUTION INTRAVENOUS; SUBCUTANEOUS at 06:50

## 2020-07-12 RX ADMIN — INSULIN LISPRO SCH UNITS: 100 INJECTION, SOLUTION INTRAVENOUS; SUBCUTANEOUS at 17:09

## 2020-07-12 RX ADMIN — METOPROLOL TARTRATE SCH MG: 50 TABLET, FILM COATED ORAL at 08:24

## 2020-07-12 RX ADMIN — APIXABAN SCH MG: 5 TABLET, FILM COATED ORAL at 08:25

## 2020-07-12 RX ADMIN — APIXABAN SCH MG: 5 TABLET, FILM COATED ORAL at 20:57

## 2020-07-12 RX ADMIN — ASPIRIN SCH MG: 81 TABLET ORAL at 08:45

## 2020-07-12 RX ADMIN — METOPROLOL TARTRATE SCH MG: 50 TABLET, FILM COATED ORAL at 20:57

## 2020-07-12 NOTE — MHIPNPDOC
[facility] Progress Note


Progress Note


DATE OF SERVICE: 7/12/20


HPI:


Velma presents today for a follow up and denies any scary thoughts and 

hallucinations. She notes of shortness of breath, but it is not a new symptom 

due to her weight. She denies any fevers or chest pains.





MEDICATIONS:


She is doing well on Abilify and denies any adverse reactions or side effects.


Objective


Appearance: Well nourished. Well groomed.





Speech: Normal volume. Normal rate.





Thought Form: Linear and goal directed.





Thought Content: No evidence of delusions. No evidence of suicidal ideation. No 

thoughts of self harm. No evidence of aggressive or homicidal ideation.





Judgement: intact as evidenced by decision making in the recent past.





Insight: good insight into symptoms and treatment options.


Assessment


F23 Brief psychotic disorder


Plan


Continue Abilify 10 mg daily.


Patient got an injection of 400 mg Abilify.


Will ascertain effects and likely discharge on Monday or Tuesday.





Vital Signs





Vital Signs








  Date Time  Temp Pulse Resp B/P (MAP) Pulse Ox O2 Delivery O2 Flow Rate FiO2


 


7/12/20 08:24  86  139/77    


 


7/12/20 06:30 99.0  18     


 


7/10/20 07:19     95 Room Air  











Laboratory Data


24H Labs


Laboratory Tests 2


7/11/20 16:58: Bedside Glucose (Misc Panel) 189H


7/11/20 20:43: Bedside Glucose (Misc Panel) 195H


7/12/20 06:13: Bedside Glucose (Misc Panel) 157H


7/12/20 12:03: Bedside Glucose (Misc Panel) 120H





Current Medications





Current Medications








 Medications


  (Trade)  Dose


 Ordered  Sig/Huan


 Route


 PRN Reason  Start Time


 Stop Time Status Last Admin


Dose Admin


 


 Acetaminophen


  (Tylenol Tab)  650 mg  Q6HP  PRN


 PO


 HEADACHE or DISCOMFORT  7/9/20 20:15


     





 


 Al Hydrox/Mg


 Hydrox/Simethicone


  (Mylanta)  30 ml  Q4HP  PRN


 PO


 HEARTBURN/INDIGESTION  7/9/20 20:15


     





 


 Apixaban


  (Eliquis)  5 mg  BID


 PO


   7/10/20 09:00


    7/12/20 08:25





 


 Aripiprazole


  (AbiLIFY)  10 mg  QHS


 PO


   7/10/20 21:00


    7/11/20 20:46





 


 Aspirin


  (Ecotrin)  81 mg  DAILY


 PO


   7/10/20 09:00


    7/12/20 08:45





 


 Dextrose


  (Dextrose 50%)  25 ml  ASDIRECTED  PRN


 IV


 SEE LABEL COMMENTS  7/9/20 20:30


     





 


 Glucagon


  (Glucagon)  1 mg  ASDIRECTED  PRN


 SC


 SEE LABEL COMMENTS  7/9/20 20:30


     





 


 Glucose


  (Glucose)  16 GM  ASDIRECTED  PRN


 PO


 SEE LABEL COMMENTS  7/9/20 20:30


     





 


 Insulin Human


 Lispro


  (HumaLOG INSULIN)  See


 Protocol


 Table  AC


 SC


   7/10/20 07:30


    7/12/20 12:05





 


 Insulin Human


 Lispro


  (HumaLOG INSULIN)  See


 Protocol


 Table  QHS


 SC


   7/9/20 21:00


     





 


 Magnesium


 Hydroxide


  (Milk Of


 Magnesia)  30 ml  DAILYPRN  PRN


 PO


 CONSTIPATION  7/9/20 20:15


     





 


 Metoprolol


 Succinate


  (TopROL XL)  50 mg  BID


 PO


   7/9/20 21:00


   UNV  





 


 Metoprolol


 Tartrate


  (Lopressor)  50 mg  BID


 PO


   7/10/20 09:00


    7/12/20 08:24





 


 Olanzapine


  (ZyPREXA


 **ZYDIS**)  5 mg  Q6HP  PRN


 PO


 AGITATION  7/9/20 20:15


    7/11/20 00:49





 


 Trazodone HCl


  (Desyrel)  50 mg  QHSP  PRN


 PO


 INSOMNIA  7/9/20 20:15


    7/11/20 00:49














Allergies


Coded Allergies:  


     levofloxacin (Verified  Adverse Reaction, Unknown, DIARRHEA, 7/7/20)











RAMON PIERRE DO              Jul 12, 2020 12:42

## 2020-07-13 VITALS — SYSTOLIC BLOOD PRESSURE: 123 MMHG | DIASTOLIC BLOOD PRESSURE: 62 MMHG

## 2020-07-13 VITALS — DIASTOLIC BLOOD PRESSURE: 79 MMHG | SYSTOLIC BLOOD PRESSURE: 141 MMHG

## 2020-07-13 RX ADMIN — INSULIN LISPRO SCH UNITS: 100 INJECTION, SOLUTION INTRAVENOUS; SUBCUTANEOUS at 11:51

## 2020-07-13 RX ADMIN — APIXABAN SCH MG: 5 TABLET, FILM COATED ORAL at 08:43

## 2020-07-13 RX ADMIN — METOPROLOL TARTRATE SCH MG: 50 TABLET, FILM COATED ORAL at 08:44

## 2020-07-13 RX ADMIN — ASPIRIN SCH MG: 81 TABLET ORAL at 08:43

## 2020-07-13 RX ADMIN — INSULIN LISPRO SCH UNITS: 100 INJECTION, SOLUTION INTRAVENOUS; SUBCUTANEOUS at 07:00

## 2020-07-13 NOTE — MHDSPDOC
[facility] Discharge Summary


Discharge Summary


DATE OF ADMISSION: Jul 9, 2020 at 21:50 


DATE OF DISCHARGE:Jul 13, 2020 at 14:25


 








DISCHARGE DIAGNOSES:





Bipolar disorder,'s most recent episode manic, severe








REASON FOR ADMISSION: 64-year-old woman with long history of bipolar and 

psychotic disorder present psychotic and distorted





CONSULTANTS INVOLVED:[ None (basic hospitalist screening)]





TREATMENT AND PROGRESS ON THE UNIT : 





Medication changes: start on Abilify as she had done well in the past titrated 

10 mg daily, accepted 400 mg injection of long-acting depot of Abilify


Behavior on unit: improved as she clinically improved, becoming much more 

friendly and amenable


Treatment attendance: attended more as she improved


Notable issues on presentation: none


State on discharge: [improved]





DISCHARGE ASSESSMENT: The patient a 64 year old woman, with likely psychosis 

related to chronic severe mental illness, presented to [facility], where they 

treated with appropriate antipsychotic and long-acting injectable to ensure 

compliance where they make significant progress.





Legal status considerations:





The patient at the time of discharge did not meet criteria for involuntary 

admission/extension due to having a improved mental status exam, improved 

insight into the situation, They are engaged in the discharge process, as well 

as being friendly and amenable in behavioral control and havent been engaging 

in any observed concerning behavior or ideation recently. They decline voluntary

 extension/admission at this time and must be discharged in good danay, as Im 

unable to make a case for holding the patient against their will. They may have 

historical risk factors of admissions and other interactions with psychiatry 

however, those are not modifiable from a clinical perspective. The patient will 

need to be discharged in good danay.





MENTAL STATUS EXAMINATION ON DISCHARGE: 








General: [Well dressed with good hygiene]


Speech: [Spontaneous and fluid]


Thought processes: [Linear and logical]


Thought content: [Future orientated]


Abstract reasoning, and computation: [Intact]


Description of associations: improved


Description of abnormal or psychotic thoughts:[Denies any suicidal or homicidal 

ideation. Denies any auditory or visual hallucinations. Does not appear to be 

responding to internal stimuli. Does not appear to be endorsing any bizarre or 

paranoid ideation.]


Judgment: improved


Insight: improved


Orientation: [Alert and orientated 3]


Recent and remote memory: [Intact]


Attention span and concentration: [Intact]


Fund of knowledge: [Adequate]


Mood: ["okay"]


Affect: [Euthymic with a full range]


























PLAN/FOLLOWUP ARRANGEMENTS: Follow up appointments made (PCP and MH in 5 days of

D/C date) and safety plan completed. 





Safety Planning aspects completed prior to discharge

















[Medication supplies limited to 7 days with 4 refills to prevent accumulation to

 OD]





[RN reviewed crisis hotline information and other aspects to empower patient to 

access care in interim before next appointment.]


Injectable antipsychotics used to reduce the risk of noncompliance








The amount of time spent in the coordination of care for this patient was a

pproximately 30 minutes.





Vital Signs/I&Os





Vital Signs








  Date Time  Temp Pulse Resp B/P (MAP) Pulse Ox O2 Delivery O2 Flow Rate FiO2


 


7/13/20 08:44  90  141/79    


 


7/13/20 06:47 96.7  16   Room Air  


 


7/10/20 07:19     95   











Laboratory Data


Labs 24H


Laboratory Tests 2


7/12/20 12:03: Bedside Glucose (Misc Panel) 120H


7/12/20 17:05: Bedside Glucose (Misc Panel) 154H


7/12/20 20:53: Bedside Glucose (Misc Panel) 171H


7/13/20 06:12: Bedside Glucose (Misc Panel) 169H





Medications


Scheduled


Apixaban (Eliquis) 5 Mg Tablet, 5 MG PO BID for 30 Days, #60


Aripiprazole (Abilify) 10 Mg Tablet, 10 MG PO QHS for mood for 7 Days, #7


Aripiprazole (Abilify Maintena) 400 Mg Suser.syr, 400 MG IM Q4WKS for mood for 

28 Days, #400


   next inject 8/10/20 


Metoprolol Tartrate (Lopressor) 50 Mg Tablet, 50 MG PO BID for 30 Days, #60





Scheduled PRN


Aspirin/Acetaminophen/Caffeine (Headache Relief Tablet) 1 Tab Tab, 1 TAB PO Q6H 

PRN for HEADACHE, (Reported)





Allergies


Coded Allergies:  


     levofloxacin (Verified  Adverse Reaction, Unknown, DIARRHEA, 7/7/20)











RAMON PIERRE DO              Jul 13, 2020 09:19

## 2021-01-18 ENCOUNTER — HOSPITAL ENCOUNTER (INPATIENT)
Dept: HOSPITAL 53 - M ED | Age: 66
LOS: 1 days | Discharge: HOME | DRG: 149 | End: 2021-01-19
Attending: GENERAL PRACTICE | Admitting: STUDENT IN AN ORGANIZED HEALTH CARE EDUCATION/TRAINING PROGRAM
Payer: MEDICARE

## 2021-01-18 VITALS — SYSTOLIC BLOOD PRESSURE: 125 MMHG | DIASTOLIC BLOOD PRESSURE: 65 MMHG

## 2021-01-18 VITALS — BODY MASS INDEX: 41.1 KG/M2 | HEIGHT: 66 IN | WEIGHT: 255.74 LBS

## 2021-01-18 DIAGNOSIS — F32.9: ICD-10-CM

## 2021-01-18 DIAGNOSIS — M19.90: ICD-10-CM

## 2021-01-18 DIAGNOSIS — Z88.1: ICD-10-CM

## 2021-01-18 DIAGNOSIS — T45.516A: ICD-10-CM

## 2021-01-18 DIAGNOSIS — F20.9: ICD-10-CM

## 2021-01-18 DIAGNOSIS — Z91.14: ICD-10-CM

## 2021-01-18 DIAGNOSIS — I44.0: ICD-10-CM

## 2021-01-18 DIAGNOSIS — Z91.128: ICD-10-CM

## 2021-01-18 DIAGNOSIS — Z11.52: ICD-10-CM

## 2021-01-18 DIAGNOSIS — F41.9: ICD-10-CM

## 2021-01-18 DIAGNOSIS — I48.92: ICD-10-CM

## 2021-01-18 DIAGNOSIS — R27.0: ICD-10-CM

## 2021-01-18 DIAGNOSIS — H81.399: Primary | ICD-10-CM

## 2021-01-18 LAB
ALBUMIN SERPL BCG-MCNC: 3.4 GM/DL (ref 3.2–5.2)
ALT SERPL W P-5'-P-CCNC: 21 U/L (ref 12–78)
APTT BLD: 26.3 SECONDS (ref 24.2–38.5)
BASOPHILS # BLD AUTO: 0 10^3/UL (ref 0–0.2)
BASOPHILS NFR BLD AUTO: 0.3 % (ref 0–1)
BILIRUB CONJ SERPL-MCNC: 0.1 MG/DL (ref 0–0.2)
BILIRUB SERPL-MCNC: 0.6 MG/DL (ref 0.2–1)
BUN SERPL-MCNC: 17 MG/DL (ref 7–18)
CALCIUM SERPL-MCNC: 8.9 MG/DL (ref 8.8–10.2)
CHLORIDE SERPL-SCNC: 104 MEQ/L (ref 98–107)
CK MB CFR.DF SERPL CALC: 1.56
CK MB SERPL-MCNC: < 1 NG/ML (ref ?–3.6)
CK SERPL-CCNC: 64 U/L (ref 26–192)
CO2 SERPL-SCNC: 30 MEQ/L (ref 21–32)
CREAT SERPL-MCNC: 0.78 MG/DL (ref 0.55–1.3)
EOSINOPHIL # BLD AUTO: 0.1 10^3/UL (ref 0–0.5)
EOSINOPHIL NFR BLD AUTO: 0.5 % (ref 0–3)
GFR SERPL CREATININE-BSD FRML MDRD: > 60 ML/MIN/{1.73_M2} (ref 45–?)
GLUCOSE SERPL-MCNC: 158 MG/DL (ref 70–100)
HCT VFR BLD AUTO: 43 % (ref 36–47)
HGB BLD-MCNC: 13.5 G/DL (ref 12–15.5)
INR PPP: 0.98
LYMPHOCYTES # BLD AUTO: 1.3 10^3/UL (ref 1.5–5)
LYMPHOCYTES NFR BLD AUTO: 14.3 % (ref 24–44)
MCH RBC QN AUTO: 28.8 PG (ref 27–33)
MCHC RBC AUTO-ENTMCNC: 31.4 G/DL (ref 32–36.5)
MCV RBC AUTO: 91.9 FL (ref 80–96)
MONOCYTES # BLD AUTO: 0.6 10^3/UL (ref 0–0.8)
MONOCYTES NFR BLD AUTO: 5.9 % (ref 0–5)
NEUTROPHILS # BLD AUTO: 7.3 10^3/UL (ref 1.5–8.5)
NEUTROPHILS NFR BLD AUTO: 78.5 % (ref 36–66)
PLATELET # BLD AUTO: 202 10^3/UL (ref 150–450)
POTASSIUM SERPL-SCNC: 4.8 MEQ/L (ref 3.5–5.1)
PROT SERPL-MCNC: 7.2 GM/DL (ref 6.4–8.2)
PROTHROMBIN TIME: 13.2 SECONDS (ref 12.5–14.3)
RBC # BLD AUTO: 4.68 10^6/UL (ref 4–5.4)
RSV RNA NPH QL NAA+PROBE: NEGATIVE
SODIUM SERPL-SCNC: 139 MEQ/L (ref 136–145)
T4 FREE SERPL-MCNC: 0.81 NG/DL (ref 0.76–1.46)
TROPONIN I SERPL-MCNC: < 0.02 NG/ML (ref ?–0.1)
TSH SERPL DL<=0.005 MIU/L-ACNC: 0.51 UIU/ML (ref 0.36–3.74)
WBC # BLD AUTO: 9.3 10^3/UL (ref 4–10)

## 2021-01-18 NOTE — CCD
Summarization Of Episode

                             Created on: 2021



JOSE R MAK

External Reference #: [id number]

: 1955

Sex: Female



Demographics





                          Address                   [address] [place] [address]

 

                          Home Phone                [phone]

 

                          Preferred Language        English

 

                          Marital Status            Single

 

                          Advent Affiliation     EP

 

                          Race                      White

 

                          Ethnic Group              Not  or 





Author





                          Author                    [facility]

 

                          Organization              [facility]

 

                          Address                   Unknown

 

                          Phone                     Unavailable







Support





                Name            Relationship    Address         Phone

 

                RODRIGUEZDENISE VELIZ  Next Of Kin     Unknown         Unavailable

 

                    Rell FNP,  Chela Next Of Kin         [address] [place] [address]                    [phone]

 

                    Oneida ORO,  Margie Next Of Kin         [address] [place] [address]  [phone]                [phone]

 

                    Sae GTZ,  Coty Next Of Kin         [address] [place] [address]                    [phone]

 

                    Elvin RPA-C,  Raiza Next Of Kin         [address] [place] [address]                    [phone]

 

                    Nallely ANP-BC,  Deyanira Next Of Kin         [address] [place], [place] [id number]                    [phone]

 

                    ""                  Next Of Kin         [address] [place]  [address]                    [phone]

 

                DISABLED        Next Of Kin     Unknown         Unavailable

 

                UE              Next Of Kin     Unknown         Unavailable

 

                    [facility] NEWSPAPER Next Of Kin         [address] [place]  [address]                    [phone]

 

                    [place]  [address]                    [phone]







Care Team Providers





                    Care Team Member Name Role                Phone

 

                    Rell, A Chela FNP Unavailable         Unavailable

 

                    Rell, A Chela FNP Unavailable         Unavailable

 

                    Rell, A Chela FNP Unavailable         Unavailable

 

                    Rell, A Chela FNP Unavailable         Unavailable

 

                    Rell, A Chela FNP Unavailable         Unavailable

 

                    Rell, A Chela FNP Unavailable         Unavailable

 

                    Rell, A Chela FNP Unavailable         Unavailable

 

                    Rell, A Chela FNP Unavailable         Unavailable

 

                    Rell, A Chela FNP Unavailable         Unavailable

 

                    Rell, A Chela FNP Unavailable         Unavailable

 

                    Rell, A Chela FNP Unavailable         Unavailable

 

                    Rell, A Chela FNP Unavailable         Unavailable

 

                    Rell, A Chela FNP Unavailable         Unavailable

 

                    Rell, A Chela FNP Unavailable         Unavailable

 

                    Rell, A Chela FNP Unavailable         Unavailable

 

                    Rell, A Chela FNP Unavailable         Unavailable

 

                    Rell, A Chela FNP Unavailable         Unavailable

 

                    Rell, A Chela FNP Unavailable         Unavailable

 

                    Rell, A Chela FNP Unavailable         Unavailable

 

                    Rell, A Chela FNP Unavailable         Unavailable

 

                    Rell, A Chela FNP Unavailable         Unavailable

 

                    Rell, A Chela FNP Unavailable         Unavailable

 

                    Rell, A Chela FNP Unavailable         Unavailable

 

                    Rell, A Chela FNP Unavailable         Unavailable

 

                    Rell, A Chela FNP Unavailable         Unavailable

 

                    Rell, A Chela FNP Unavailable         Unavailable

 

                    Rell, A Chela FNP Unavailable         Unavailable

 

                    Dianne Car MD Unavailable         Unavailable

 

                    Dianne Car MD Unavailable         Unavailable

 

                    Dianne Car MD Unavailable         Unavailable

 

                    Dianne Car MD Unavailable         Unavailable

 

                    Dianne Car MD Unavailable         Unavailable

 

                    Dianne Car MD Unavailable         Unavailable

 

                    Dianne Car MD Unavailable         Unavailable

 

                    Dianne Car MD Unavailable         Unavailable

 

                    Dianne Car MD Unavailable         Unavailable

 

                    Dianne Car MD Unavailable         Unavailable

 

                    Dianne Car MD Unavailable         Unavailable

 

                    Dianne Car MD Unavailable         Unavailable

 

                    Dianne Car MD Unavailable         Unavailable

 

                    Dianne Car MD Unavailable         Unavailable

 

                    Dianne Car MD Unavailable         Unavailable

 

                    Dianne Car MD Unavailable         Unavailable

 

                    Dianne Car MD Unavailable         Unavailable

 

                    Dianne Car MD Unavailable         Unavailable

 

                    Dianne Car MD Unavailable         Unavailable

 

                    Dianne Car MD Unavailable         Unavailable

 

                    Dianne Car MD Unavailable         Unavailable

 

                    Dianne Car MD Unavailable         Unavailable

 

                    Dianne Car MD Unavailable         Unavailable

 

                    Dianne Car MD Unavailable         Unavailable

 

                    Dianne Car MD Unavailable         Unavailable

 

                    Dianne Car MD Unavailable         Unavailable

 

                    Dianne Car MD Unavailable         Unavailable

 

                    Dianne Car MD Unavailable         Unavailable

 

                    Dianne Car MD Unavailable         Unavailable

 

                    Dianne Car MD Unavailable         Unavailable

 

                    Dianne Car MD Unavailable         Unavailable

 

                    Dianne Car MD Unavailable         Unavailable

 

                    Dianne Car MD Unavailable         Unavailable

 

                    Dianne Car MD Unavailable         Unavailable

 

                    Dianne Car MD Unavailable         Unavailable

 

                    Dianne Car MD Unavailable         Unavailable

 

                    Dianne Car MD Unavailable         Unavailable

 

                    Slezka,  Vojtech MD Unavailable         Unavailable

 

                    Slezka,  Vojtech MD Unavailable         Unavailable

 

                    Slezka,  Vojtech MD Unavailable         Unavailable

 

                    Slezka,  Vojtech MD Unavailable         Unavailable

 

                    Slezka,  Vojtech MD Unavailable         Unavailable

 

                    Slezka,  Vojtech MD Unavailable         Unavailable

 

                    Slezka,  Vojtech MD Unavailable         Unavailable

 

                    Slezka,  Vojtech MD Unavailable         Unavailable

 

                    Slezka,  Vojtech MD Unavailable         Unavailable

 

                    Slezka,  Vojtech MD Unavailable         Unavailable

 

                    Slezka,  Vojtech MD Unavailable         Unavailable

 

                    Slezka,  Vojtech MD Unavailable         Unavailable

 

                    Slezka,  Vojtech MD Unavailable         Unavailable

 

                    Slezka,  Vojtech MD Unavailable         Unavailable

 

                    Slezka,  Vojtech MD Unavailable         Unavailable

 

                    Slezka,  Vojtech MD Unavailable         Unavailable

 

                    Slezka,  Vojtech MD Unavailable         Unavailable

 

                    Slezka,  Vojtech MD Unavailable         Unavailable

 

                    Slezka,  Vojtech MD Unavailable         Unavailable

 

                    Slezka,  Vojtech MD Unavailable         Unavailable

 

                    [facility],  JLAFLEUR     Unavailable         Unavailable

 

                    Chela Zacarias FNP FNP Unavailable         Unavailable

 

                    EGORHO, F FRANCK FPMHNP Unavailable         Unavailable

 

                    EGORHO, F FRANCK FPMHNP Unavailable         Unavailable

 

                    EGORHO, F FRANCK FPMHNP Unavailable         Unavailable

 

                    EGORHO, F FRANCK FPMHNP Unavailable         Unavailable

 

                    EGORHO, F FRANCK FPMHNP Unavailable         Unavailable

 

                    EGORHO, F FRANCK FPMHNP Unavailable         Unavailable



                                  



Re-disclosure Warning

          The records that you are about to access may contain information from 
federally-assisted alcohol or drug abuse programs. If such information is 
present, then the following federally mandated warning applies: This information
has been disclosed to you from records protected by federal confidentiality 
rules (42 CFR part 2). The federal rules prohibit you from making any further 
disclosure of this information unless further disclosure is expressly permitted 
by the written consent of the person to whom it pertains or as otherwise 
permitted by 42 CFR part 2. A general authorization for the release of medical 
or other information is NOT sufficient for this purpose. The Federal rules 
restrict any use of the information to criminally investigate or prosecute any 
alcohol or drug abuse patient.The records that you are about to access may 
contain highly sensitive health information, the redisclosure of which is 
protected by Article 27-F of the [place] Public Health law. If you 
continue you may have access to information: Regarding HIV / AIDS; Provided by 
facilities licensed or operated by the [facility] Office of Mental Health; 
or Provided by the [facility] Office for People With Developmental 
Disabilities. If such information is present, then the following [place] 
mandated warning applies: This information has been disclosed to you from 
confidential records which are protected by state law. State law prohibits you 
from making any further disclosure of this information without the specific 
written consent of the person to whom it pertains, or as otherwise permitted by 
law. Any unauthorized further disclosure in violation of state law may result in
a fine or nursing home sentence or both. A general authorization for the release of 
medical or other information is NOT sufficient authorization for further disc
losure.                                                                         
    



Allergies and Adverse Reactions

          



           Type       Description Substance  Reaction   Status     Data Source(s

)

 

                    Propensity to adverse reactions to substance nkda           

     24 HR Bupropion Hydrochloride 

150 MG Extended Release Oral Tablet                     Active              [facility]

[facility] ([facility])



                                                                                
       



Encounters

          



           Encounter  Providers  Location   Date       Indications Data Source(s

)

 

                Outpatient      Attender: FRANCK GUTIERREZ [facility] 2020 

10:00:00 AM EDT - 2020 10:00:00 AM EDT                           [facility]

 ([facility])

 

                      Attender: FRANCK LOPEZELIZABETH            2020 12:00:

00 AM EDT            [facility] ([facility])

 

                Outpatient      Attender: Dianne Car MD [email or web address]  12:00:00 AM 

EDT - 2020 02:23:26 PM EDT                           [facility]

 

           Outpatient Attender: ULISSES GTZ          2020 02:57:00 P

M EDT            [facility]

 

           Outpatient Attender: ULISSES GTZ          2020 02:54:00 P

M EDT            [facility]

 

           Outpatient Attender: ULISSES GTZ          2020 02:53:00 P

M EDT            [facility]

 

           [facility]         [facility] 01:20:01 P

M EDT            [facility]

 

           Outpatient Attender: ULISSES GTZ          2020 01:18:00 P

M EDT            [facility]

 

           Outpatient Attender: ULISSES GTZ          2020 01:17:01 P

M EDT            [facility]

 

           Outpatient Attender: Chela Rell ALVAREZP FP         2020 11:1

6:03 AM EDT            [facility]

 

           Outpatient Attender: ULISSES Rell KIMP FP         2020 10:51:00 A

M EDT            [facility]

 

           Outpatient Attender: ULISSES Rell FNP FP         2020 10:50:00 A

M EDT            [facility]

 

           Outpatient Attender: ULISSES ALVAREZP FP         07/15/2020 05:06:01 P

M EDT            [facility]

 

           Outpatient Attender: Chela Rell ALVAREZP FP         07/15/2020 03:4

2:00 PM EDT            [facility]

 

           Outpatient Attender: ULISSES ALVAREZP FP         07/15/2020 11:50:00 A

M EDT            [facility]

 

           Outpatient Attender: ULISSES ALVAREZP FP         2020 12:07:01 P

M EDT            [facility]

 

           Outpatient Attender: ULISSES Zacarias FNP FP         2020 09:36:01 A

M EDT            [facility]

 

           Outpatient Attender: Chela Zacarias KIMP FP         06/10/2020 03:4

4:00 PM EDT            [facility]

 

           Outpatient Attender: ULISSES ALVAREZP FP         2020 02:47:00 P

M EDT            [facility]

 

           Outpatient Attender: ULISSES Zacarias FNP FP         2020 08:01:14 P

M EDT            [facility]

 

           Outpatient Attender: ULISSES ALVAREZP FP         2019 09:01:01 P

M [facility]

 

           Outpatient Attender: ULISSES ALVAREZP FP         2019 08:40:00 A

M [facility]

 

           Outpatient Attender: CARSON HERNANDEZ FP         2019 09:01:01 PM 

[facility]

[facility]

 

           Outpatient Attender: CARSON LAW FP         2019 03:04:01 PM 

[facility]

[facility]

 

           Outpatient Attender: CARSON LAW FP         2019 03:00:01 PM 

[facility]

[facility]

 

           Outpatient Attender: CARSON LAW FP         2019 02:59:00 PM 

[facility]

[facility]

 

           Outpatient Attender: CARSON LAW FP         2019 02:06:03 PM 

[facility]

 

           [facility]         2019 02:05:01 PM 

[facility]

 

           [facility]: [facility]         2019 01:10:01 PM 

[facility]

 

           [facility]: [facility]         2019 01:08:01 PM 

[facility]

 

           Outpatient Attender: CARSON [facility]         2019 01:06:00 PM 

[facility]



                                                                                
                                                                                
                                                                                
                                                                                
                                                      



Functional Status

                                                                                
                 



Medications

          



          Medication Brand Name Start Date Product Form Dose      Route     Admi

nistrative 

Instructions Pharmacy Instructions Status     Indications Reaction   Description

 Data 

Source(s)

 

          10 mg               2020 12:00:00 AM EDT tablet    30           

       TAKE ONE TABLET BY MOUTH EVERY 

DAY        TAKE ONE TABLET BY MOUTH EVERY DAY SOLD: 2020                  

                [facility]

 

          400 mg              2020 12:00:00 AM EDT suspension,extended rel

 syring 1                   INJECT 

400MG INTRAMUSCULARLY ONCE EVERY FOUR WEEKS INJECT 400MG INTRAMUSCULARLY ONCE 

EVERY FOUR WEEKS SOLD: 2020                                        [facility]

 

           aripiprazole 10 MG Oral Tablet aripiprazole 2020 12:00:00 AM ED

T            10 mg      

by mouth             completed             [id number] aripiprazole by mouth [id number]

2020 

09/10/2020 once a day 30    10 mg tablet             [id number] Farzad fernández Egorho 

[id number]          Nurse Practitioner                      [facility]

 

          10 mg               2020 12:00:00 AM EDT tablet    7            

       TAKE ONE TABLET BY MOUTH EVERY DAY

AT BEDTIME FOR MOOD       TAKE ONE TABLET BY MOUTH EVERY DAY AT BEDTIME FOR MOOD

 SOLD:

2020                                                      [facility]

 

          400 mg              2020 12:00:00 AM EDT suspension,extended rel

 syring 1                   INJECT 

400MG INTRAMUSCULARLY ONCE EVERY 4 WEEKS FOR MOOD INJECT 400MG INTRAMUSCULARLY 

ONCE EVERY 4 WEEKS FOR MOOD SOLD: 2020                                    

    [facility]

 

          10 mg               2020 12:00:00 AM EDT tablet    7            

       TAKE ONE TABLET BY MOUTH EVERY DAY

AT BEDTIME FOR MOOD       TAKE ONE TABLET BY MOUTH EVERY DAY AT BEDTIME FOR MOOD

 SOLD:

2020                                                      [facility]

 

          10 mg               2020 12:00:00 AM EDT tablet    7            

       TAKE ONE TABLET BY MOUTH EVERY DAY

AT BEDTIME FOR MOOD       TAKE ONE TABLET BY MOUTH EVERY DAY AT BEDTIME FOR MOOD

 SOLD:

2020                                                      [facility]



                                                                                
                                               



Insurance Providers

          



             Payer name   Policy type / Coverage type Policy ID    Covered party

 ID Covered 

party's relationship to flaherty Policy Flaherty             Plan Information

 

          [facility]           [id number]           SP           

       [id number]

 

          [facility] PLAN [facility]           [id number]           SP           

       [id number]

 

          [facility] MCR HMO           [id number]           SP          

        [id number]

 

          MEDICARE            [id number]           SP                  [id number]

[id number]

 

          MEDICARE COMPLETE           [id number]           SP                  [id number]

[id number]

 

          MEDICARE            [id number]           SP                  [id number]

[id number]

 

          [facility] MEDICARE           [id number]           Hilary                 [id number]

[id number]

 

          [facility] [employer] P         [id number]           S      

             [id number]

 

          Medicaid  S         [id number]            S                   [id number]

 

          [facility] [employer] P         [id number]           S      

             [id number]

 

          EMEDNY              [id number]            SP                  [id number]

 

          [facility] MCRO           [id number]           SP             

     [id number]

 

          [facility] [employer] P         [id number]           S      

             [id number]

 

          MEDICAID            [id number]            SP                  [id number]

 

          MEDICAID  M         [id number]            S                   [id number]

 

          MEDICARE  C         [id number]           S                   [id number]

A

 

          [facility]           [id number]           SP             

     [id number]

 

          MEDICAID            [id number]            SP                  [id number]

 

          [facility] [employer] P         [id number]           S      

             [id number]

 

          Medicaid  S         [id number]            S                   [id number]

 

          SELF PAY ONLY           [id number]           SP                  [id number]

[id number]

 

          [facility] [employer] P         [id number]           S      

             [id number]

 

          Medicare  S         [id number]           S                   [id number]

A

 

          MEDICARE            [id number]           SP                  [id number]

A

 

          Managed Care - Community Plan [facility] P         [id number]   

        S                   [id number]

 

          Medicare  P         [id number]           S                   [id number]

A

 

          Medicaid  S         [id number]            S                   [id number]

 

          Managed Care - Community Plan [employer] P         [id number]   

        S                   [id number]

 

          [facility] COMMUNITY PLAN [facility]           [id number]           SP           

       [id number]

 

          Managed Care - Community Plan [employer] P         [id number]   

        S                   [id number]

 

          Medicaid  S         [id number]            S                   [id number]

 

          Managed Care BCBS O         [id number]           S                  

 [id number]

 

          BLUE CROSS [employer] PLAN           [id number]           SP            

      [id number]

 

          [facility] BLUE            [id number]           SP                  [id number]

[id number]

 

                                                                       



                                                                                
                                                                                
                                                                                
                                                                                
                                                                                
                       



Problems, Conditions, and Diagnoses

          



           Code       Display Name Description Problem Type Effective Dates Data

 Source(s)

 

                    F32.0               Major depressive disorder, single episod

e, mild Major Depressive Disorder,

Single episode, Mild Condition           2020 12:00:00 AM EDT [facility] ([facility] 

[facility])

 

                          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

 due to a substance or known physiological 

condition                 Other Specified Schizophrenia Spectrum and Other Psych

otic Disorder 

Condition                 2020 12:00:00 AM EDT [facility] ([facility])

 

           525.10     Teeth extraction Teeth extraction            2019 02

:04:23 PM EST [facility]

 

             I48.92       Unspecified atrial flutter Unspecified atrial flutter 

Diagnosis    

2020 12:54:58 PM EDT              [facility]



                                                                                
                                               



Surgeries/Procedures

          



             Procedure    Description  Date         Indications  Data Source(s)

 

                    OFFICE OUTPATIENT VISIT 15 MINUTES                      12:00:00 AM EDT - 2020 

12:00:00 AM EDT                                     [facility] ([facility])

 

             OFFICE OUTPATIENT VISIT 15 MINUTES              2020 12:00:00

 AM EDT              [facility] ([facility])



                                                                                
                 



Results

          



                    ID                  Date                Data Source

 

                    [id number]    2020 10:48:24 AM EDT [facility]

 

                                        Measurements & CalculationsHeight:      

       67 inches (5 ft. 7 in.) 170.18 cm

   Weight:             273 pounds      124.09 kg    Body Mass Index (BMI):  
42.91BMI Interpretation:         Morbidly ObeseBody Surface Area (BSA):    
2.31Weight Management Education Done (Nutrition/Physical Activity)Vital 
SignsTemperature:        98.4FPulse Rate:         76 beats/minuteRespiratory 
Rate:   18 respirations/minuteBlood Pressure:     113/74     O2 Saturation:  94%
        Vital Signs performed by: Allyn Liu MA,  2020 10:59 
AMInitial Intake Information From: patientRoom #: 14Infectious Disease / Travel 
ScreeningRecent travel for you or any close contacts? NoHave you had any close 
contact with anyone diagnosed with or under investigation for COVID-19 
(coronavirus)? NoFever? NoRespiratory symptoms: cough, cold, congestion, 
shortness of breath, difficulty breathing? NoLoss of smell? NoLoss of taste? 
NoSmoking, Tobacco, Vaping or Smoke Exposure StatusSmoke Status: former 
smokerTobacco Use: NoDo you vape? NoMenstrual HistoryAny possibility of 
pregnancy? NoHealthcare HistorySince your last office visit...Have you been 
[facility]Have you been to an emergency room (ER) 
or urgent care clinic? NoHave you seen another healthcare  provider? NoHave you 
seen a dentist? NoIntake performed by:  Allyn Liu MA,  2020 10:51 
AMRate Your HealthIn general, would you say your health is? Very GoodPain 
AssessmentAre you currently having any pain which...    You would like your 
provider to address? No    Affects your activity level? NoDepression Screening -
PHQ-2Over the last two weeks, have you...    Had little interest or pleasure in 
doing things? Several days    Been feeling down, depressed, or hopeless? Several
days PHQ-2 Score: 2Anxiety Screening - NINFA-2Over the last two weeks, have you 
been...    Feeling nervous, anxious, or on edge? Several days NINFA-2 Score: 
3Generalized Anxiety Disorder 7-Item Screening (NINFA-7)Answer Guide:0 = Not at 
all1 = Several days2 = Over half the days3 = Nearly every dayOver the last 2 
weeks, how often have you been bothered by the following problems?Feeling 
nervous, anxious, or on edge: 1Not being able to stop or control worryinWorrying too much about different things: 2Trouble relaxinBeing so restless
that it's hard to sit still: 0Becoming easily annoyed or irritable: 2Feeling 
afraid as if something awful might happen: 1Answer Guide:0 = Not difficult at 
all1 = Somewhat difficult2 = Very difficult3 = Extremely difficultHow difficult 
have these made it for you to do your work, take care of things at home, or get 
along with other people? 1GAD-7 Screening Results NINFA-2 Score: 3GAD-7 Score: 
9Functional Impairment: Somewhat difficultRecommendation: Mild anxietyPHQ-9 1.  
Over the last 2 weeks, patient reports the following frequency of symptoms:     
a. Little interest or pleasure in doing things                               -
Several days    b. Feeling down, depressed, or hopeless                         
        -Several days    c. Trouble falling asleep, staying asleep, or sleeping 
too much   -Not at all    d. Feeling tired or having little energy              
                        -Several days    e. Poor appetite or overeating         
                                        -Nearly every day    f.  Feeling bad 
about yourself, feeling that you are a failure, or feeling that you have let 
yourself or your        family down                                             
                         -Nearly every day    g. Trouble concentrating on things
such as reading the newspaper        or watching television                     
                                    -Several days    h. Moving or speaking so 
slowly that other people could have noticed. Or being so fidgety or restless 
that        you have been moving around a lot more than usual               -Not
at all     i. Thinking that you would be better off dead or that you want       
 to hurt yourself in some way                                               -Not
at all2. If you checked off any problems, how difficult have these problems made
it for you to do your work, take       care of things at home, or get along with
other people?           -Somewhat DifficultToday's PHQ-9 Results    Score: 10   
Severity: Moderate    Diagnosis Recommendation: No recommendation    Functional 
Impairment: Somewhat DifficultScreening, Brief Intervention, & Referral to 
Treatment (SBIRT)Pre-Screening Questions How many times have you have 4 or more 
drinks in a day? 0How many times have you used an illegal drug or used a 
prescription medication for a non-medical reason? 0Performed by: Allyn Liu MA,  2020 10:51 AMPatient History Social/Personal History: Chief 
ComplaintHDHistory of Present Illness (HPI)Recently hospitalized for worsenng of
her schizophrenia. Started on Abilify and doing better now. [facility] Psych[facility] 
in one week. No thoughts of self harm.Also had atrial flutter as a an inpatient 
and this was assessed. She has an appointment with her Cardiologist in 2 weeks. 
Has had issues in the past with atrial fibrillation. Currently asymptomatic.HPI 
performed by: Guido Castillo MD,  2020 11:03 AMTransitions of Care Inbound
Problem ReviewProblem List was reviewed and/or updated during this 
visit.Medication Reconciliation & ReviewMedication List was reviewed and/or 
updated during this visit, including review of any over-the-counter medications,
herbal therapies, and/or supplements.Allergy ReviewAllergy List was reviewed 
and/or updated during this visit.Adult Preventive CareLabs/Meds/Other 
Counseling-Nutrition and Physical Activity:BMI Interpretation: Morbidly Obese 
(2020)  Counseling: Done (2020)  Physical Activity: Done 
(2020)Cancer Screening  Mammogram Reviewed: Previous Comments: 2-3years 
ago unsure of where (2018)Today's Comments: SMCColorectal Screening: 
Patient refused colorectal screeningReview of Systems General: Denies chills, 
dizziness, fatigue, fever.  Cardiovascular: Denies chest pain.  Respiratory: 
Denies shortness of breath.  Gastrointestinal: Denies constipation.  
Genitourinary: Denies burning with urination.  Physical ExamGeneral Appearance: 
well nourished, well hydrated, no acute distressRespiratory, Auscultation: clear
to auscultation bilaterally; no rales, rhonchi, or wheezesRespiratory, Effort: 
no intercostal retractions or use of accessory musclesCardiovascular, Auscu
ltation: S1, S2 audible; no murmur, rub, or gallop; RRRGait & Station: 
normalSkin, Inspection: no rashes, lesions, or ulcerationsOrientation: oriented 
to time, place, and personMood & Affect: no depression, anxiety, or 
agitationJudgment & Insight: intactCare Management Plan Transitions of 
CareInboundRate Your HealthIn general, would you say your health is? Very 
GoodAssessment & Plan Problems:Assessed:Schizophrenia (ICD-295.90) (YIL85-U99.9)
  Assessment:    Instructions: With recent hospital admit.Doing much better 
now.Continue current plan and recheck as scheduled with Psychiatrist next 
week.Unspecified atrial fibrillation (AJW91-J73.91)   Assessment:    
Instructions: With recent flare of this while inpatient.Currently symptoms 
free.Keep appointment in 2 weeks with Cardiologist.Patient Instructions/Care 
Plan: Schizophrenia: With recent hospital admit.Doing much better now.Continue 
current plan and recheck as scheduled with Psychiatrist next week.Unspecified 
atrial fibrillation: With recent flare of this while inpatient.Currently 
symptoms free.Keep appointment in 2 weeks with Cardiologist.----------Plan 
developed in collaboration with patient and/or familyMedications:ABILIFY MA
INTENA 400 MG INTRAMUSCULAR PREFILLED SYRINGEABILIFY 10 MG ORAL TABLETADULT 
ASPIRIN REGIMEN 81 MG ORAL TABLET DELAYED RELEASEALCOHOL WIPES PADBLOOD GLUCOSE 
TEST IN VITRO STRIPLANCETSMedication Changes:Added: ABILIFY 10 MG ORAL 
TABLETABILIFY MAINTENA 400 MG INTRAMUSCULAR PREFILLED SYRINGERemoved:CARVEDILOL 
12.5 MG ORAL TABLET-by mouth, twice a day for htn, DOCUSATE SODIUM 100 MG ORAL 
CAPSULE-by mouth, twice a day for constipation, TRAZODONE HCL 50 MG ORAL TABLET-
by mouth, at bedtime as needed for insomnia, ABILIFY 10 MG ORAL TABLET-by mouth 
twice daily for mood, ABILIFY MAINTENA 400 MG INTRAMUSCULAR SUSPENSION 
RECONSTITUTED ER-IM, every 4 weeks for psychosis for 28 days, ARISTADA 882 
MG/3.2ML INTRAMUSCULAR PREFILLED SYRINGE-IM, every 8 weeksAllergies:AMOXICILLIN 
(Moderate)Orders:Adult - Ofc Vst, EST, Level III [CPT-84645] Electronically 
signed by Guido Castillo MD on 2020 at 11:15 
AM________________________________________________________________________ 









          Name      Value     Range     Interpretation Code Description Data Carlene

rce(s) Supporting 

Document(s)

 

                                                                       









                    ID                  Date                Data Source

 

                    [id number]    2019 01:06:02 PM EST [facility]

 

                                        Current Problems: Teeth extraction (ICD-

525.10) (AMN85-P91.499)depression (ICD-

311) (FJP61-S41.9)Bipolar disorder (ICD-296.80) (VID73-K79.9)Schizophrenia (ICD-
295.90) (PNM86-D73.9)Unspecified atrial fibrillation (ENB57-L76.91)BMI 38.0-38.9
(ICD-V85.38) (LQN35-R76.38)Localized swelling, mass and lump, left lower limb 
(ICD-729.81) (AXT48-R06.42)Cellulitis of lower limb (ICD-682.6) 
(DDZ41-A48.119)Constipation (ICD-564.00) (FXO89-D44.00)upper respiratory 
infection (ICD-465.9) (LWJ78-Q57.9)Vaccination (ICD-V05.9) (ICD10-
Z23)Unspecified idiopathic peripheral neuropathy (ICD-356.9) 
(EXK85-M81.9)Dysthymic Disorder (ICD-300.4) (PLM71-P50.1)Psychotic Disorder NOS 
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
Screening (ICD-V70.0) (JAE66-Y29.9)PHARYNGITIS (ICD-462) (FNL68-Y85.9)SHINGLES 
(ICD-053.9) (UKL61-Q69.9)SPECIAL SCREENING FOR MALIGNANT NEOPLASMS COLON (ICD-
V76.51) (YNN89-N00.11)TONGUE DISORDER (ICD-529.9) (BPW65-I56.9)OBESITY (ICD-
278.00) (IYI23-S51.9)NONDEPENDENT TOBACCO USE DISORDER (ICD-305.1) (ICD10-
Z72.0)OTHER DISORDERS OF BONE AND CARTILAGE OTHER (ICD-733.99)OTHER SCREENING 
BREAST EXAMINATION (ICD-V76.19) (TMF86-T67.39)SCREENING FOR MALIGNANT NEOPLASM 
OF THE CERVIX (ICD-V76.2) (IYI74-B26.4)HYPERLIPIDEMIA (ICD-272.4) (ICD10-
E78.5)PSORIASIS, FEET (ICD-696.1) (MWO36-W41.9)ECZEMA, ATOPIC (ICD-691.8) 
(IHT98-Q53.9)UNSPECIFIED ADJUSTMENT REACTION (ICD-309.9) (ISB66-H87.20)VITAMIN D
DEFICIENCY (ICD-268.9) (ESP74-K70.9)THYROID NODULE (ICD-241.0) (ICD10-
E04.1)CALLUSES, FEET, BILATERAL (ICD-700) (MBK05-E27)DIABETES, TYPE 2 (ICD-
250.00) (AMC52-T40.9)Current Medications: CARVEDILOL 12.5 MG ORAL TABLET 
(CARVEDILOL) by mouth, twice a day for htn; Route: ORALDOCUSATE SODIUM 100 MG 
ORAL CAPSULE (DOCUSATE SODIUM) by mouth, twice a day for constipation; Route: 
ORALTRAZODONE HCL 50 MG ORAL TABLET (TRAZODONE HCL) by mouth, at bedtime as 
needed for insomnia; Route: ORALADULT ASPIRIN REGIMEN 81 MG ORAL TABLET DELAYED 
RELEASE (ASPIRIN) by mouth, daily for cad; Route: ORALARISTADA 882 MG/3.2ML 
INTRAMUSCULAR PREFILLED SYRINGE (ARIPIPRAZOLE LAUROXIL) IM, every 8 weeks; 
Route: INTRAMUSCULARABILIFY MAINTENA 400 MG INTRAMUSCULAR SUSPENSION 
RECONSTITUTED ER (ARIPIPRAZOLE) IM, every 4 weeks for psychosis for 28 days; 
Route: INTRAMUSCULARABILIFY 10 MG ORAL TABLET (ARIPIPRAZOLE) by mouth twice 
daily for mood; Route: ORALALCOHOL WIPES PAD (ALCOHOL SWABS) use as directed to 
test blood sugar twice weekly. DX:250.00BLOOD GLUCOSE TEST IN VITRO STRIP 
(GLUCOSE BLOOD) use to test blood glucose daily Dx code 250.00LANCETS (LANCETS) 
test twice weekly, once before breakfast, and once before dinner. Dx 
250.00Current Allergies: AMOXICILLIN (Moderate)                           Dental
Chart:  Procedures:Type - CDT Code - Description B - () Limited oral 
evaluation - problem focused on Tooth # 12 (Performed by Rossy Sanabria DMD) B - 
() Intraoral, periapical, first radiographic image on Tooth # 12 (Performed
by Rossy Sanabria DMD) Treatments:Type - CDT Code - Description T - () Ex
traction, erupted tooth or exposed root (elevation and/or forceps removal) on 
Tooth # 12 (Performed by Rossy Sanabria DMD)       Chart Notes:pola (2019  
2:04PM): S: CC: "I I am having a very bad toothache on the top left side. The 
tooth is changing color starting last week."HPI: last week. O: RMHx (-)per pt. 
has type 2 diabetes, depression and Schizophrenia. per pt. has decided to stop s
eeing medical for any further treatment and has stopped taking all medications 
as of last year.  BP: 124/67. PA taken. A: Discussed with pt.. concerns of not 
taking medications for Diabetes type 2 and all other medications that are needed
 Offered pt. that we could help set up appt. to see primary care and pt. denied 
any help from us at this time. #12 decayed into the pulp.  Advised pt. to have 
extracted.  Discussed with pt. as we are unware of H1AC levels, referral to OS 
would be the best option.  No signs of infection at this time. P: Placed 
referral to OS. Informed Pt about new pain management policy of the clinic 
regarding about narcotic,told pt to alternate Ibuprophen 600- 800mg and tylenol 
500mg every 4 to 6 hrs for pain when neededAssisted By:AM.NV: new p/e.Rossy Sanabria DMD by pola (2019 2:04 PM): Tooth Notes and Watches: Assessment & Plan 
Problems:Added: Teeth extraction (ICD-525.10) (ICD10-
K08.499)Medications:CARVEDILOL 12.5 MG ORAL TABLETDOCUSATE SODIUM 100 MG ORAL 
CAPSULETRAZODONE HCL 50 MG ORAL TABLETADULT ASPIRIN REGIMEN 81 MG ORAL TABLET 
DELAYED RELEASEARISTADA 882 MG/3.2ML INTRAMUSCULAR PREFILLED SYRINGEABILIFY 
MAINTENA 400 MG INTRAMUSCULAR SUSPENSION RECONSTITUTED ERABILIFY 10 MG ORAL 
TABLETALCOHOL WIPES PADBLOOD GLUCOSE TEST IN VITRO 
STRIPLANCETSAllergies:AMOXICILLIN (Moderate)Orders:Oral Surgery Referral [CPT-
62784] Electronically signed by Rossy Sanabria DMD on 2019 at 2:04 
PM________________________________________________________________________ 









          Name      Value     Range     Interpretation Code Description Data Carlene

rce(s) Supporting 

Document(s)

 

                                                                       







                                        Procedure

 

                                          



                                                                                
                           



Social History

          



           Code       Duration   Value      Status     Description Data Source(s

)

 

             Smoking      2020 12:00:00 AM EDT Unknown if ever smoked comp

leted    Unknown if 

ever smoked                             [facility] ([facility])



                                                                                
                 



Vital Signs

          



                    ID                  Date                Data Source

 

                    UNK                                      









           Name       Value      Range      Interpretation Code Description Data

 Source(s)

 

             Diastolic blood pressure 0 mm[Hg]                  Normal (applies 

to non-numeric results) 0 

mm[Hg]                                  [facility] ([facility])

 

             Systolic blood pressure 0 mm[Hg]                  Normal (applies t

o non-numeric results) 0 

mm[Hg]                                  [facility] ([facility])

 

                Body mass index (BMI) [Ratio] 0.00 kg/m2                      No

rmal (applies to non-numeric 

results)                  0.00 kg/m2                [facility] ([facility]

 

             Body weight Measured 0.00 lbs                  Normal (applies to n

on-numeric results) 0.00 lbs

                                        [facility] ([facility])

 

           Body height 0.00 in               Normal (applies to non-numeric resu

lts) 0.00 in    [facility] 

([facility])

## 2021-01-18 NOTE — ECGEPIP
[facility] - ED

                                       

                                       Test Date:    2021

Pat Name:     JOSE R MAK             Department:   

Patient ID:   [id number]                 Room:         -

Gender:       Female                   Technician:   corona

:          1955               Requested By: ANA SANTOS

Order Number: [id number]     Reading MD:   Maddie Longoria

                                 Measurements

Intervals                              Axis          

Rate:         54                       P:            

OK:           0                        QRS:          42

QRSD:         86                       T:            40

QT:           434                                    

QTc:          414                                    

                           Interpretive Statements

SINUS BRADYCARDIA

LOW QRS VOLTAGE

PRIOR ATRIAL FLUTTER 20

Electronically Signed on 2021 20:00:27 EST by Maddie Longoria

## 2021-01-18 NOTE — HPEPDOC
General


Date of Admission


Jan 18, 2021


Date of Service:  Jan 18, 2021


Chief Complaint


The patient is a 65-year-old female admitted with a reason for visit of ataxia


Source:  Patient





History of Present Illness


Ms. Zamorano is a 65 year old female with paroxysmal atrial flutter, medical 

noncompliance, and schizophrenia who is here for ataxia. She is supposed to be 

on Eliquis, but has not taken Eliquis in months. She has not been on her 

scheduled medications for several months as she made excuses to not see her 

physicians. She did not want to take her medications as some of them made her 

anxious. She remembers that one gave her diarrhea, but does not remember which 

one. 





Yesterday morning and afternoon, she was feeling well. Then yesterday evening, 

she stood up and felt dizzy. She felt that she had poor balance and had ringing 

in the left ear. She took acetaminophen thinking that it would help, but it did 

not help. This morning she continued to have ataxia. At one point she felt 

nauseous and try to vomit, but was unable to. She came to the ED for further 

evaluation. While in the ED, CT head was unremarkable. Electrolytes and renal 

function are within normal. Liver function within normal. No leukocytosis or 

anemia. Orthostatic vitals negative. The ED gave fluid and meclizine. She felt 

better but continued to have ataxia. They called for admission.





I spoke with her, she says she's had night chills a few days ago, but now 

resolved. She reports dizziness like spinning. Denies chest pain, dyspnea, 

cough, abdominal pain, or rashes. CN 3-12 grossly intact and no focal weakness. 

She was able to do finger to nose, but she has circular tremors when pursuing my

finger. She'll be admitted for rule out stroke.





Home Medications


No Active Prescriptions or Reported Meds





Allergies


Coded Allergies:  


     levofloxacin (Verified  Adverse Reaction, Unknown, DIARRHEA, 7/7/20)





Past Medical History


Medical History


1. Paroxysmal atrial flutter noncompliant with Eliquis


2. Abdominal hernia 


3. Osteoarthritis


4. Chronic back pain


5. Extensive psychiatric history of schizophrenia, depression, and anxiety


Surgical History


1. Tubal ligation





Family History


Father: Stroke


Mother: Denies medical history mother





Social History


* Smoker:  former Smoker


Alcohol:  sober


Drugs:  denies





A-FIB/CHADSVASC


A-FIB History


Current/History of A-Fib/PAF?:  Yes


Current PO Anticoag Therapy:  No





Age/Risk Factor Scoring


CHADSVASC:  








CHADSVASC Response (Comments) Value


 


Age Risk Factor Age 65-74 years old 1


 


Gender Risk Factor Female 1


 


Hx of CHF No 0


 


Hx of HTN No 0


 


Hx of Stroke/TIA/or VTE No 0


 


Hx of Diabetes No 0


 


Hx of Vascular Disease No 0


 


Total  2











Treatment


Treatment ordered:  Holding Other (Unknown if CVA, risk of hemorrhagic 

conversion if CVA present)





Review of Systems


Constitutional:  Reports: Chills, Night Sweats; 


   Denies: Fever


Eyes:  Denies: Vision change


ENT:  Reports: Other Symptoms (tinnitis in left ear); 


   Denies: Sore Throat


Skin:  Denies: Rash


Pulmonary:  Denies: Dyspnea, Cough


Cardiovascular:  Denies: Chest Pain, Palpitations


Gastrointestinal:  Reports: Nausea (had an episode of nausea this morning), 

Diarrhea; 


   Denies: Abdominal Pain


Genitourinary:  Denies: Dysuria


Hematologic:  Denies: Bruising


Neurological:  Reports: Other Symptoms (Dizziness like room is spinning)


Psych:  Reports: Anxiety, Depression





Physical Examination


General Exam:  Positive: Alert, Cooperative


Eye Exam:  Positive: EOMI, Other Eye Symptoms; 


   Negative: Sclera icteric


ENT Exam:  Positive: Tongue Midline


Chest Exam:  Positive: Clear to auscultation; 


   Negative: Rales, Rhonchi, Wheezing


Heart Exam:  Positive: Bradycardic, Regular Rhythm


Abdomen Exam:  Positive: Normal bowel sounds, Other (Obese); 


   Negative: Tenderness


Extremity Exam:  Positive: Edema (mild bilateral )


Neuro Exam:  Positive: Normal Speech, Cranial Nerves 3-12 NL, Other (Able to 

touch finger to nose, but has circular tremors)


Psych Exam:  Positive: Anxiety, Other (Affect looks a little flat)





Vital Signs





Vital Signs








  Date Time  Temp Pulse Resp B/P (MAP) Pulse Ox O2 Delivery O2 Flow Rate FiO2


 


1/18/21 16:24      Room Air  


 


1/18/21 16:04  57 18  95   


 


1/18/21 15:00    107/63 (78)    


 


1/18/21 12:35 97.9       











Laboratory Data


Labs 24H


Laboratory Tests 2


1/18/21 13:15: 


Immature Granulocyte % (Auto) 0.5, Neutrophils (%) (Auto) 78.5H, Lymphocytes (%)

(Auto) 14.3L, Monocytes (%) (Auto) 5.9H, Eosinophils (%) (Auto) 0.5, Basophils 

(%) (Auto) 0.3, Neutrophils # (Auto) 7.3, Lymphocytes # (Auto) 1.3L, Monocytes #

(Auto) 0.6, Eosinophils # (Auto) 0.1, Basophils # (Auto) 0.0, Nucleated Red 

Blood Cells % (auto) 0.0, Prothrombin Time 13.2, Prothromb Time International 

Ratio 0.98, Activated Partial Thromboplast Time 26.3, Anion Gap 5L, Glomerular 

Filtration Rate > 60.0, Calcium Level 8.9, Total Bilirubin 0.6, Direct Bilirubin

0.1, Aspartate Amino Transf (AST/SGOT) 20, Alanine Aminotransferase (ALT/SGPT) 

21, Alkaline Phosphatase 65, Total Creatine Kinase 64, Creatine Kinase MB < 1.0,

Creatine Kinase MB Relative Index 1.56, Troponin I < 0.02, Total Protein 7.2, 

Albumin 3.4, Albumin/Globulin Ratio 0.9L, Thyroid Stimulating Hormone (TSH) 

0.512, Free Thyroxine 0.81


1/18/21 16:36: 


CBC/BMP


Laboratory Tests


1/18/21 13:15











 Assessment/Plan


Ms. Zamorano is a 65 year old female with paroxysmal atrial flutter, medical 

noncompliance, and schizophrenia who is here for ataxia. She has been 

noncompliant with Eliquis for atrial flutter. There is concern that she may have

a stroke. MRI has been ordered, pending results. If she does have a stroke, she 

would need the full stroke workup. Otherwise we'll order physical therapy to 

work with her ataxia. We'll order a vitamin B12 level and HbA1c





Plan / VTE


VTE Prophylaxis Ordered?:  Yes





Plan


Plan


1. Ataxia


Has risk factors for stroke


Pending MRI results. She does have a stroke she'll need full stroke workup


Ordered physical therapy for ataxia


Ordered vitamin B12 and HbA1c





2. Paroxysmal atrial flutter


Noncompliant with metoprolol and Eliquis


-Will hold off on metoprolol due to borderline bradycardia and low blood p

ressure


-Will hold off on Eliquis until we know if she has a CVA due to risk of 

hemorrhagic conversion. 


If no CVA, she will need to be back on Eliquis. 


If there is CVA, will need to have discussion with neurology on when to restart

Eliquis





3. Sinus bradycardia / borderline first degree heart block


-She would not be able to tolerate metoprolol


-Monitor on tele


-Echocardiogram





4. Psychosis


-She is supposed to be on Abilify, but she does not appear to have psychosis at 

this time


-She would need outpatient follow up with psych to manage psych medication





5. DVT ppx


-SCD and TEDs. Will need to know if there is CVA or not before starting 

anticoagulation











JOSE GARCIA DO               Jan 18, 2021 17:52

## 2021-01-18 NOTE — CCD
Summarization Of Episode

                             Created on: 2021



JOSE R MAK

External Reference #: [id number]

: 1955

Sex: Female



Demographics





                          Address                   [address] [place] [address]

 

                          Home Phone                [phone]

 

                          Preferred Language        English

 

                          Marital Status            Single

 

                          Catholic Affiliation     EP

 

                          Race                      White

 

                          Ethnic Group              Not  or 





Author





                          Author                    [facility]

 

                          Organization              [facility]

 

                          Address                   Unknown

 

                          Phone                     Unavailable







Support





                Name            Relationship    Address         Phone

 

                RODRIGUEZDENISE VELIZ  Next Of Kin     Unknown         Unavailable

 

                    Rell FNP,  Chela Next Of Kin         [address] [place] [address]                    [phone]

 

                    Oneida ORO,  Margie Next Of Kin         [address] [place] [address]  [id number]                [phone]

 

                    Sae GTZ,  Coty Next Of Kin         [address] [place] [address]                    [phone]

 

                    Elvin RPA-C,  Raiza Next Of Kin         [address] [place] [address]                    [phone]

 

                    Nallely ANP-BC,  Deyanira Next Of Kin         [address] [place], [place] [address]                    [phone]

 

                    ""                  Next Of Kin         [address] [place]  [address]                    [phone]

 

                DISABLED        Next Of Kin     Unknown         Unavailable

 

                UE              Next Of Kin     Unknown         Unavailable

 

                    [facility] NEWSPAPER Next Of Kin         [address] [place]  [address]                    [phone]

 

                    [place]  [address]                    [phone]







Care Team Providers





                    Care Team Member Name Role                Phone

 

                    Rell, A Chela FNP Unavailable         Unavailable

 

                    Rell, A Chela FNP Unavailable         Unavailable

 

                    Rell, A Chela FNP Unavailable         Unavailable

 

                    Rell, A Chela FNP Unavailable         Unavailable

 

                    Rell, A Chela FNP Unavailable         Unavailable

 

                    Rell, A Chela FNP Unavailable         Unavailable

 

                    Rell, A Chela FNP Unavailable         Unavailable

 

                    Erll, A Chela FNP Unavailable         Unavailable

 

                    Rell, A Chela FNP Unavailable         Unavailable

 

                    Rell, A Chela FNP Unavailable         Unavailable

 

                    Rell, A Chela FNP Unavailable         Unavailable

 

                    Rell, A Chela FNP Unavailable         Unavailable

 

                    Rell, A Chela FNP Unavailable         Unavailable

 

                    Rell, A Chela FNP Unavailable         Unavailable

 

                    Rell, A Chela FNP Unavailable         Unavailable

 

                    Rell, A Chela FNP Unavailable         Unavailable

 

                    Rell, A Chela FNP Unavailable         Unavailable

 

                    Rell, A Chela FNP Unavailable         Unavailable

 

                    Rell, A Chela FNP Unavailable         Unavailable

 

                    Rell, A Chela FNP Unavailable         Unavailable

 

                    Rell, A Chela FNP Unavailable         Unavailable

 

                    Rell, A Chela FNP Unavailable         Unavailable

 

                    Rell, A Chela FNP Unavailable         Unavailable

 

                    Rell, A Chela FNP Unavailable         Unavailable

 

                    Rell, A Chela FNP Unavailable         Unavailable

 

                    Rell, A Chela FNP Unavailable         Unavailable

 

                    Rell, A Chela FNP Unavailable         Unavailable

 

                    Dianne Car MD Unavailable         Unavailable

 

                    Dianne Car MD Unavailable         Unavailable

 

                    Dianne Car MD Unavailable         Unavailable

 

                    Dianne Car MD Unavailable         Unavailable

 

                    Dianne Car MD Unavailable         Unavailable

 

                    Dianne Car MD Unavailable         Unavailable

 

                    Dianne Car MD Unavailable         Unavailable

 

                    Dianne Car MD Unavailable         Unavailable

 

                    Dianne Car MD Unavailable         Unavailable

 

                    Dianne Car MD Unavailable         Unavailable

 

                    Dianne Car MD Unavailable         Unavailable

 

                    Dianne Car MD Unavailable         Unavailable

 

                    Dianne Car MD Unavailable         Unavailable

 

                    Dianne Car MD Unavailable         Unavailable

 

                    Dianne Car MD Unavailable         Unavailable

 

                    Dianne Car MD Unavailable         Unavailable

 

                    Dianne Car MD Unavailable         Unavailable

 

                    Dianne Car MD Unavailable         Unavailable

 

                    Dianne Car MD Unavailable         Unavailable

 

                    Dianne Car MD Unavailable         Unavailable

 

                    Dianne Car MD Unavailable         Unavailable

 

                    Dianne Car MD Unavailable         Unavailable

 

                    Dianne Car MD Unavailable         Unavailable

 

                    Dianne Car MD Unavailable         Unavailable

 

                    Dianne Car MD Unavailable         Unavailable

 

                    Dianne Car MD Unavailable         Unavailable

 

                    Dianne Car MD Unavailable         Unavailable

 

                    Dianne Car MD Unavailable         Unavailable

 

                    Dianne Car MD Unavailable         Unavailable

 

                    Dianne Car MD Unavailable         Unavailable

 

                    Dianne Car MD Unavailable         Unavailable

 

                    Dianne Car MD Unavailable         Unavailable

 

                    Dianne Car MD Unavailable         Unavailable

 

                    Dianne Car MD Unavailable         Unavailable

 

                    Dianne Car MD Unavailable         Unavailable

 

                    Dianne Car MD Unavailable         Unavailable

 

                    Dianne Car MD Unavailable         Unavailable

 

                    Slezka,  Vojtech MD Unavailable         Unavailable

 

                    Slezka,  Vojtech MD Unavailable         Unavailable

 

                    Slezka,  Vojtech MD Unavailable         Unavailable

 

                    Slezka,  Vojtech MD Unavailable         Unavailable

 

                    Slezka,  Vojtech MD Unavailable         Unavailable

 

                    Slezka,  Vojtech MD Unavailable         Unavailable

 

                    Slezka,  Vojtech MD Unavailable         Unavailable

 

                    Slezka,  Vojtech MD Unavailable         Unavailable

 

                    Slezka,  Vojtech MD Unavailable         Unavailable

 

                    Slezka,  Vojtech MD Unavailable         Unavailable

 

                    Slezka,  Vojtech MD Unavailable         Unavailable

 

                    Slezka,  Vojtech MD Unavailable         Unavailable

 

                    Slezka,  Vojtech MD Unavailable         Unavailable

 

                    Slezka,  Vojtech MD Unavailable         Unavailable

 

                    Slezka,  Vojtech MD Unavailable         Unavailable

 

                    Slezka,  Vojtech MD Unavailable         Unavailable

 

                    Slezka,  Vojtech MD Unavailable         Unavailable

 

                    Slezka,  Vojtech MD Unavailable         Unavailable

 

                    Slezka,  Vojtech MD Unavailable         Unavailable

 

                    Slezka,  Vojtech MD Unavailable         Unavailable

 

                    [facility],  JLAFLEUR     Unavailable         Unavailable

 

                    Chela Zacarias FNP FNP Unavailable         Unavailable

 

                    EGORHO, F FRANCK FPMHNP Unavailable         Unavailable

 

                    EGORHO, F FRANCK FPMHNP Unavailable         Unavailable

 

                    EGORHO, F FRANCK FPMHNP Unavailable         Unavailable

 

                    EGORHO, F FRANCK FPMHNP Unavailable         Unavailable

 

                    EGORHO, F FRANCK FPMHNP Unavailable         Unavailable

 

                    EGORHO, F FRANCK FPMHNP Unavailable         Unavailable



                                  



Re-disclosure Warning

    



Allergies and Adverse Reactions

          



           Type       Description Substance  Reaction   Status     Data Source(s

)

 

                    Propensity to adverse reactions to substance nkda           

     24 HR Bupropion Hydrochloride 

150 MG Extended Release Oral Tablet                     Active              [facility]

[facility] ([facility])



                                                                                
       



Encounters

          



           Encounter  Providers  Location   Date       Indications Data Source(s

)

 

                Outpatient      Attender: FRANCK GUTIERREZ [facility] 2020 

10:00:00 AM EDT - 2020 10:00:00 AM EDT                           [facility]

 ([facility])

 

                      Attender: FRANCK LOPEZELIZABETH            2020 12:00:

00 AM EDT            [facility] ([facility])

 

                Outpatient      Attender: Dianne Car MD [email or web address]  12:00:00 AM 

EDT - 2020 02:23:26 PM EDT                           [facility]

 

           Outpatient Attender: ULISSES GTZ          2020 02:57:00 P

M EDT            [facility]

 

           Outpatient Attender: ULISSES GTZ          2020 02:54:00 P

M EDT            [facility]

 

           Outpatient Attender: ULISSES GTZ          2020 02:53:00 P

M EDT            [facility]

 

           [facility]         2020 01:20:01 P

M EDT            [facility]

 

           Outpatient Attender: ULISSES GTZ          2020 01:18:00 P

M EDT            [facility]

 

           Outpatient Attender: ULISSES GTZ          2020 01:17:01 P

M EDT            [facility]

 

           Outpatient Attender: Chela Rell ALVAREZP FP         2020 11:1

6:03 AM EDT            [facility]

 

           Outpatient Attender: ULISSES Rell KIMP FP         2020 10:51:00 A

M EDT            [facility]

 

           Outpatient Attender: ULISSES Rell FNP FP         2020 10:50:00 A

M EDT            [facility]

 

           Outpatient Attender: ULISSES ALVAREZP FP         07/15/2020 05:06:01 P

M EDT            [facility]

 

           Outpatient Attender: Chela Rell ALVAREZP FP         07/15/2020 03:4

2:00 PM EDT            [facility]

 

           Outpatient Attender: ULISSES ALVAREZP FP         07/15/2020 11:50:00 A

M EDT            [facility]

 

           Outpatient Attender: ULISSES ALVAREZP FP         2020 12:07:01 P

M EDT            [facility]

 

           Outpatient Attender: ULISSES Zacarias FNP FP         2020 09:36:01 A

M EDT            [facility]

 

           Outpatient Attender: Chela Zacarias KIMP FP         06/10/2020 03:4

4:00 PM EDT            [facility]

 

           Outpatient Attender: ULISSES ALVAREZP FP         2020 02:47:00 P

M EDT            [facility]

 

           Outpatient Attender: ULISSES Zacarias FNP FP         2020 08:01:14 P

M EDT            [facility]

 

           Outpatient Attender: ULISSES ALVAREZP FP         2019 09:01:01 P

M [facility]

 

           Outpatient Attender: ULISSES ALVAREZP FP         2019 08:40:00 A

M [facility]

 

           Outpatient Attender: CARSON HERNANDEZ FP         2019 09:01:01 PM 

[facility]

[facility]

 

           Outpatient Attender: CARSON LAW FP         2019 03:04:01 PM 

[facility]

[facility]

 

           Outpatient Attender: CARSON LAW FP         2019 03:00:01 PM 

[facility]

[facility]

 

           Outpatient Attender: CARSON LAW FP         2019 02:59:00 PM 

[facility]

[facility]

 

           Outpatient Attender: CARSON LAW FP         2019 02:06:03 PM 

[facility]

 

           [facility]         2019 02:05:01 PM 

[facility]

 

           [facility]: [facility]         2019 01:10:01 PM 

[facility]

 

           [facility]: [facility]         2019 01:08:01 PM 

[facility]

 

           Outpatient Attender: CARSON [facility]         2019 01:06:00 PM 

[facility]



                                                                                
                                                                                
                                                                                
                                                                                
                                                      



Functional Status

                                                                                
                 



Medications

          



          Medication Brand Name Start Date Product Form Dose      Route     Admi

nistrative 

Instructions Pharmacy Instructions Status     Indications Reaction   Description

 Data 

Source(s)

 

          10 mg               2020 12:00:00 AM EDT tablet    30           

       TAKE ONE TABLET BY MOUTH EVERY 

DAY        TAKE ONE TABLET BY MOUTH EVERY DAY SOLD: 2020                  

                [facility]

 

          400 mg              2020 12:00:00 AM EDT suspension,extended rel

 syring 1                   INJECT 

400MG INTRAMUSCULARLY ONCE EVERY FOUR WEEKS INJECT 400MG INTRAMUSCULARLY ONCE 

EVERY FOUR WEEKS SOLD: 2020                                        [facility]

 

           aripiprazole 10 MG Oral Tablet aripiprazole 2020 12:00:00 AM ED

T            10 mg      

by mouth             completed             [id number] aripiprazole by mouth [id number]

2020 

09/10/2020 once a day 30    10 mg tablet             [id number] Farzad fernández Egorho 

[id number]          Nurse Practitioner                      [facility]

 

          10 mg               2020 12:00:00 AM EDT tablet    7            

       TAKE ONE TABLET BY MOUTH EVERY DAY

AT BEDTIME FOR MOOD       TAKE ONE TABLET BY MOUTH EVERY DAY AT BEDTIME FOR MOOD

 SOLD:

2020                                                      [facility]

 

          400 mg              2020 12:00:00 AM EDT suspension,extended rel

 syring 1                   INJECT 

400MG INTRAMUSCULARLY ONCE EVERY 4 WEEKS FOR MOOD INJECT 400MG INTRAMUSCULARLY 

ONCE EVERY 4 WEEKS FOR MOOD SOLD: 2020                                    

    [facility]

 

          10 mg               2020 12:00:00 AM EDT tablet    7            

       TAKE ONE TABLET BY MOUTH EVERY DAY

AT BEDTIME FOR MOOD       TAKE ONE TABLET BY MOUTH EVERY DAY AT BEDTIME FOR MOOD

 SOLD:

2020                                                      [facility]

 

          10 mg               2020 12:00:00 AM EDT tablet    7            

       TAKE ONE TABLET BY MOUTH EVERY DAY

AT BEDTIME FOR MOOD       TAKE ONE TABLET BY MOUTH EVERY DAY AT BEDTIME FOR MOOD

 SOLD:

2020                                                      [facility]



                                                                                
                                               



Insurance Providers

          



             Payer name   Policy type / Coverage type Policy ID    Covered party

 ID Covered 

party's relationship to flaherty Policy Flaherty             Plan Information

 

          [facility]           [id number]           SP           

       [id number]

 

          [facility] MCR HMO           [id number]           SP          

        [id number]

 

          MEDICARE            [id number]           SP                  [id number]

[id number]

 

          MEDICARE COMPLETE           [id number]           SP                  [id number]

[id number]

 

          MEDICARE            [id number]           SP                  [id number]

[id number]

 

          [facility] MEDICARE           [id number]           Hilary                 [id number]

[id number]

 

          [facility] [employer] P         [id number]           S      

             [id number]

 

          Medicaid  S         [id number]            S                   [id number]

 

          [facility] [employer] P         [id number]           S      

             [id number]

 

          EMEDNY              [id number]            SP                  [id number]

 

          [facility] MCRO           [id number]           SP             

     [id number]

 

          [facility] [employer] P         [id number]           S      

             [id number]

 

          MEDICAID            [id number]            SP                  [id number]

 

          MEDICAID  M         [id number]            S                   [id number]

 

          MEDICARE  C         [id number]           S                   [id number]

A

 

          [facility]           [id number]           SP             

     [id number]

 

          MEDICAID            [id number]            SP                  [id number]

 

          [facility] [employer] P         [id number]           S      

             [id number]

 

          Medicaid  S         [id number]            S                   [id number]

 

          SELF PAY ONLY           [id number]           SP                  [id number]

[id number]

 

          [facility] [employer] P         [id number]           S      

             [id number]

 

          Medicare  S         [id number]           S                   [id number]

A

 

          MEDICARE            [id number]           SP                  [id number]

A

 

          Managed Care - Community Plan [facility] P         [id number]   

        S                   [id number]

 

          Medicare  P         [id number]           S                   [id number]

A

 

          Medicaid  S         [id number]            S                   [id number]

 

          Managed Care - Community Plan [employer] P         [id number]   

        S                   [id number]

 

          UN COMMUNITY PLAN [facility]           [id number]           SP           

       [id number]

 

          Managed Care - Community Plan [employer] P         [id number]   

        S                   [id number]

 

          Medicaid  S         [id number]            S                   [id number]

 

          Managed Care BCBS O         [id number]           S                  

 [id number]

 

          BLUE CROSS [employer] PLAN           [id number]           SP            

      [id number]

 

          [facility] BLUE            [id number]           SP                  [id number]

[id number]

 

                                                                       



                                                                                
                                                                                
                                                                                
                                                                                
                                                                                
             



Problems, Conditions, and Diagnoses

          



           Code       Display Name Description Problem Type Effective Dates Data

 Source(s)

 

                    F32.0               Major depressive disorder, single episod

e, mild Major Depressive Disorder,

Single episode, Mild Condition           2020 12:00:00 AM EDT [facility] ([facility] 

[facility])

 

                          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

 due to a substance or known physiological 

condition                 Other Specified Schizophrenia Spectrum and Other Psych

otic Disorder 

Condition                 2020 12:00:00 AM EDT [facility] ([facility])

 

           525.10     Teeth extraction Teeth extraction            2019 02

:04:23 PM EST [facility]

 

             I48.92       Unspecified atrial flutter Unspecified atrial flutter 

Diagnosis    

2020 12:54:58 PM EDT              [facility]



                                                                                
                                               



Surgeries/Procedures

          



             Procedure    Description  Date         Indications  Data Source(s)

 

                    OFFICE OUTPATIENT VISIT 15 MINUTES                      12:00:00 AM EDT - 2020 

12:00:00 AM EDT                                     [facility] ([facility])

 

             OFFICE OUTPATIENT VISIT 15 MINUTES              2020 12:00:00

 AM EDT              [facility] ([facility])



                                                                                
                 



Results

          



                    ID                  Date                Data Source

 

                    [id number]    2020 10:48:24 AM EDT [facility]

 

                                        Measurements & CalculationsHeight:      

       67 inches (5 ft. 7 in.) 170.18 cm

   Weight:             273 pounds      124.09 kg    Body Mass Index (BMI):  
42.91BMI Interpretation:         Morbidly ObeseBody Surface Area (BSA):    
2.31Weight Management Education Done (Nutrition/Physical Activity)Vital 
SignsTemperature:        98.4FPulse Rate:         76 beats/minuteRespiratory 
Rate:   18 respirations/minuteBlood Pressure:     113/74     O2 Saturation:  94%
        Vital Signs performed by: Allyn Liu MA,  2020 10:59 
AMInitial Intake Information From: patientRoom #: 14Infectious Disease / Travel 
ScreeningRecent travel for you or any close contacts? NoHave you had any close 
contact with anyone diagnosed with or under investigation for COVID-19 
(coronavirus)? NoFever? NoRespiratory symptoms: cough, cold, congestion, 
shortness of breath, difficulty breathing? NoLoss of smell? NoLoss of taste? 
NoSmoking, Tobacco, Vaping or Smoke Exposure StatusSmoke Status: former 
smokerTobacco Use: NoDo you vape? NoMenstrual HistoryAny possibility of 
pregnancy? NoHealthcare HistorySince your last office visit...Have you been 
ad[facility] to the hospital? [facility]Have you been to an emergency room (ER) 
or urgent care clinic? NoHave you seen another healthcare  provider? NoHave you 
seen a dentist? NoIntake performed by:  Allyn Liu MA,  2020 10:51 
AMRate Your HealthIn general, would you say your health is? Very GoodPain 
AssessmentAre you currently having any pain which...    You would like your 
provider to address? No    Affects your activity level? NoDepression Screening -
PHQ-2Over the last two weeks, have you...    Had little interest or pleasure in 
doing things? Several days    Been feeling down, depressed, or hopeless? Several
days PHQ-2 Score: 2Anxiety Screening - NINFA-2Over the last two weeks, have you 
been...    Feeling nervous, anxious, or on edge? Several days NINFA-2 Score: 
3Generalized Anxiety Disorder 7-Item Screening (NINFA-7)Answer Guide:0 = Not at 
all1 = Several days2 = Over half the days3 = Nearly every dayOver the last 2 
weeks, how often have you been bothered by the following problems?Feeling 
nervous, anxious, or on edge: 1Not being able to stop or control worryinWorrying too much about different things: 2Trouble relaxinBeing so restless
that it's hard to sit still: 0Becoming easily annoyed or irritable: 2Feeling 
afraid as if something awful might happen: 1Answer Guide:0 = Not difficult at 
all1 = Somewhat difficult2 = Very difficult3 = Extremely difficultHow difficult 
have these made it for you to do your work, take care of things at home, or get 
along with other people? 1GAD-7 Screening Results NINFA-2 Score: 3GAD-7 Score: 
9Functional Impairment: Somewhat difficultRecommendation: Mild anxietyPHQ-9 1.  
Over the last 2 weeks, patient reports the following frequency of symptoms:     
a. Little interest or pleasure in doing things                               -
Several days    b. Feeling down, depressed, or hopeless                         
        -Several days    c. Trouble falling asleep, staying asleep, or sleeping 
too much   -Not at all    d. Feeling tired or having little energy              
                        -Several days    e. Poor appetite or overeating         
                                        -Nearly every day    f.  Feeling bad 
about yourself, feeling that you are a failure, or feeling that you have let 
yourself or your        family down                                             
                         -Nearly every day    g. Trouble concentrating on things
such as reading the newspaper        or watching television                     
                                    -Several days    h. Moving or speaking so 
slowly that other people could have noticed. Or being so fidgety or restless 
that        you have been moving around a lot more than usual               -Not
at all     i. Thinking that you would be better off dead or that you want       
 to hurt yourself in some way                                               -Not
at all2. If you checked off any problems, how difficult have these problems made
it for you to do your work, take       care of things at home, or get along with
other people?           -Somewhat DifficultToday's PHQ-9 Results    Score: 10   
Severity: Moderate    Diagnosis Recommendation: No recommendation    Functional 
Impairment: Somewhat DifficultScreening, Brief Intervention, & Referral to 
Treatment (SBIRT)Pre-Screening Questions How many times have you have 4 or more 
drinks in a day? 0How many times have you used an illegal drug or used a 
prescription medication for a non-medical reason? 0Performed by: Allyn Liu MA,  2020 10:51 AMPatient History Social/Personal History: Chief 
ComplaintHDHistory of Present Illness (HPI)Recently hospitalized for worsenng of
her schizophrenia. Started on Abilify and doing better now. [facility] 
in one week. No thoughts of self harm.Also had atrial flutter as a an inpatient 
and this was assessed. She has an appointment with her Cardiologist in 2 weeks. 
Has had issues in the past with atrial fibrillation. Currently asymptomatic.HPI 
performed by: Guido Castillo MD,  2020 11:03 AMTransitions of Care Inbound
Problem ReviewProblem List was reviewed and/or updated during this 
visit.Medication Reconciliation & ReviewMedication List was reviewed and/or 
updated during this visit, including review of any over-the-counter medications,
herbal therapies, and/or supplements.Allergy ReviewAllergy List was reviewed 
and/or updated during this visit.Adult Preventive CareLabs/Meds/Other 
Counseling-Nutrition and Physical Activity:BMI Interpretation: Morbidly Obese 
(2020)  Counseling: Done (2020)  Physical Activity: Done 
(2020)Cancer Screening  Mammogram Reviewed: Previous Comments: 2-3years 
ago unsure of where (2018)Today's Comments: SMCColorectal Screening: 
Patient refused colorectal screeningReview of Systems General: Denies chills, 
dizziness, fatigue, fever.  Cardiovascular: Denies chest pain.  Respiratory: 
Denies shortness of breath.  Gastrointestinal: Denies constipation.  
Genitourinary: Denies burning with urination.  Physical ExamGeneral Appearance: 
well nourished, well hydrated, no acute distressRespiratory, Auscultation: clear
to auscultation bilaterally; no rales, rhonchi, or wheezesRespiratory, Effort: 
no intercostal retractions or use of accessory musclesCardiovascular, Auscu
ltation: S1, S2 audible; no murmur, rub, or gallop; RRRGait & Station: 
normalSkin, Inspection: no rashes, lesions, or ulcerationsOrientation: oriented 
to time, place, and personMood & Affect: no depression, anxiety, or 
agitationJudgment & Insight: intactCare Management Plan Transitions of 
CareInboundRate Your HealthIn general, would you say your health is? Very 
GoodAssessment & Plan Problems:Assessed:Schizophrenia (ICD-295.90) (RKH15-X20.9)
  Assessment:    Instructions: With recent hospital admit.Doing much better 
now.Continue current plan and recheck as scheduled with Psychiatrist next 
week.Unspecified atrial fibrillation (KIN90-J43.91)   Assessment:    
Instructions: With recent flare of this while inpatient.Currently symptoms 
free.Keep appointment in 2 weeks with Cardiologist.Patient Instructions/Care 
Plan: Schizophrenia: With recent hospital admit.Doing much better now.Continue 
current plan and recheck as scheduled with Psychiatrist next week.Unspecified 
atrial fibrillation: With recent flare of this while inpatient.Currently 
symptoms free.Keep appointment in 2 weeks with Cardiologist.----------Plan 
developed in collaboration with patient and/or familyMedications:ABILIFY MA
INTENA 400 MG INTRAMUSCULAR PREFILLED SYRINGEABILIFY 10 MG ORAL TABLETADULT 
ASPIRIN REGIMEN 81 MG ORAL TABLET DELAYED RELEASEALCOHOL WIPES PADBLOOD GLUCOSE 
TEST IN VITRO STRIPLANCETSMedication Changes:Added: ABILIFY 10 MG ORAL 
TABLETABILIFY MAINTENA 400 MG INTRAMUSCULAR PREFILLED SYRINGERemoved:CARVEDILOL 
12.5 MG ORAL TABLET-by mouth, twice a day for htn, DOCUSATE SODIUM 100 MG ORAL 
CAPSULE-by mouth, twice a day for constipation, TRAZODONE HCL 50 MG ORAL TABLET-
by mouth, at bedtime as needed for insomnia, ABILIFY 10 MG ORAL TABLET-by mouth 
twice daily for mood, ABILIFY MAINTENA 400 MG INTRAMUSCULAR SUSPENSION 
RECONSTITUTED ER-IM, every 4 weeks for psychosis for 28 days, ARISTADA 882 
MG/3.2ML INTRAMUSCULAR PREFILLED SYRINGE-IM, every 8 weeksAllergies:AMOXICILLIN 
(Moderate)Orders:Adult - Ofc Vst, EST, Level III [CPT-70497] Electronically 
signed by Guido Castillo MD on 2020 at 11:15 
AM________________________________________________________________________ 









          Name      Value     Range     Interpretation Code Description Data Carlene

rce(s) Supporting 

Document(s)

 

                                                                       









                    ID                  Date                Data Source

 

                    [id number]    2019 01:06:02 PM [facility]

 

                                        Current Problems: Teeth extraction (ICD-

525.10) (PDF79-B50.499)depression (ICD-

311) (YLO38-S21.9)Bipolar disorder (ICD-296.80) (RZF23-S30.9)Schizophrenia (ICD-
295.90) (GXM08-N97.9)Unspecified atrial fibrillation (WCO45-T51.91)BMI 38.0-38.9
(ICD-V85.38) (EAS63-J66.38)Localized swelling, mass and lump, left lower limb 
(ICD-729.81) (EID09-X41.42)Cellulitis of lower limb (ICD-682.6) 
(LFS66-L96.119)Constipation (ICD-564.00) (HBC65-T15.00)upper respiratory 
infection (ICD-465.9) (VVF56-H51.9)Vaccination (ICD-V05.9) (ICD10-
Z23)Unspecified idiopathic peripheral neuropathy (ICD-356.9) 
(XGB82-L28.9)Dysthymic Disorder (ICD-300.4) (IST48-F57.1)Psychotic Disorder NOS 
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
Screening (ICD-V70.0) (ILO31-R99.9)PHARYNGITIS (ICD-462) (OHW05-E08.9)SHINGLES 
(ICD-053.9) (DYI96-X48.9)SPECIAL SCREENING FOR MALIGNANT NEOPLASMS COLON (ICD-
V76.51) (TTB54-M13.11)TONGUE DISORDER (ICD-529.9) (SRY71-I67.9)OBESITY (ICD-
278.00) (MQH22-M26.9)NONDEPENDENT TOBACCO USE DISORDER (ICD-305.1) (ICD10-
Z72.0)OTHER DISORDERS OF BONE AND CARTILAGE OTHER (ICD-733.99)OTHER SCREENING 
BREAST EXAMINATION (ICD-V76.19) (HEA08-I62.39)SCREENING FOR MALIGNANT NEOPLASM 
OF THE CERVIX (ICD-V76.2) (TCV33-T04.4)HYPERLIPIDEMIA (ICD-272.4) (ICD10-
E78.5)PSORIASIS, FEET (ICD-696.1) (SWC22-N58.9)ECZEMA, ATOPIC (ICD-691.8) 
(CCF48-I76.9)UNSPECIFIED ADJUSTMENT REACTION (ICD-309.9) (AOY29-R85.20)VITAMIN D
DEFICIENCY (ICD-268.9) (BYP11-C66.9)THYROID NODULE (ICD-241.0) (ICD10-
E04.1)CALLUSES, FEET, BILATERAL (ICD-700) (CUY08-L73)DIABETES, TYPE 2 (ICD-
250.00) (HCV30-N67.9)Current Medications: CARVEDILOL 12.5 MG ORAL TABLET 
(CARVEDILOL) by mouth, twice a day for htn; Route: ORALDOCUSATE SODIUM 100 MG 
ORAL CAPSULE (DOCUSATE SODIUM) by mouth, twice a day for constipation; Route: 
ORALTRAZODONE HCL 50 MG ORAL TABLET (TRAZODONE HCL) by mouth, at bedtime as 
needed for insomnia; Route: ORALADULT ASPIRIN REGIMEN 81 MG ORAL TABLET DELAYED 
RELEASE (ASPIRIN) by mouth, daily for cad; Route: ORALARISTADA 882 MG/3.2ML 
INTRAMUSCULAR PREFILLED SYRINGE (ARIPIPRAZOLE LAUROXIL) IM, every 8 weeks; 
Route: INTRAMUSCULARABILIFY MAINTENA 400 MG INTRAMUSCULAR SUSPENSION 
RECONSTITUTED ER (ARIPIPRAZOLE) IM, every 4 weeks for psychosis for 28 days; 
Route: INTRAMUSCULARABILIFY 10 MG ORAL TABLET (ARIPIPRAZOLE) by mouth twice 
daily for mood; Route: ORALALCOHOL WIPES PAD (ALCOHOL SWABS) use as directed to 
test blood sugar twice weekly. DX:250.00BLOOD GLUCOSE TEST IN VITRO STRIP 
(GLUCOSE BLOOD) use to test blood glucose daily Dx code 250.00LANCETS (LANCETS) 
test twice weekly, once before breakfast, and once before dinner. Dx 
250.00Current Allergies: AMOXICILLIN (Moderate)                           Dental
Chart:  Procedures:Type - CDT Code - Description B - () Limited oral 
evaluation - problem focused on Tooth # 12 (Performed by Rossy Sanabria DMD) B - 
() Intraoral, periapical, first radiographic image on Tooth # 12 (Performed
by Rossy Sanabria DMD) Treatments:Type - CDT Code - Description T - () Ex
traction, erupted tooth or exposed root (elevation and/or forceps removal) on 
Tooth # 12 (Performed by Rossy Sanabria DMD)       Chart Notes:pola (2019  
2:04PM): S: CC: "I I am having a very bad toothache on the top left side. The 
tooth is changing color starting last week."HPI: last week. O: RMHx (-)per pt. 
has type 2 diabetes, depression and Schizophrenia. per pt. has decided to stop s
eeing medical for any further treatment and has stopped taking all medications 
as of last year.  BP: 124/67. PA taken. A: Discussed with pt.. concerns of not 
taking medications for Diabetes type 2 and all other medications that are needed
 Offered pt. that we could help set up appt. to see primary care and pt. denied 
any help from us at this time. #12 decayed into the pulp.  Advised pt. to have 
extracted.  Discussed with pt. as we are unware of H1AC levels, referral to OS 
would be the best option.  No signs of infection at this time. P: Placed 
referral to OS. Informed Pt about new pain management policy of the clinic 
regarding about narcotic,told pt to alternate Ibuprophen 600- 800mg and tylenol 
500mg every 4 to 6 hrs for pain when neededAssisted By:AM.NV: new nakul/Rossy Acuña DMD by pola (2019 2:04 PM): Tooth Notes and Watches: Assessment & Plan 
Problems:Added: Teeth extraction (ICD-525.10) (ICD10-
K08.499)Medications:CARVEDILOL 12.5 MG ORAL TABLETDOCUSATE SODIUM 100 MG ORAL 
CAPSULETRAZODONE HCL 50 MG ORAL TABLETADULT ASPIRIN REGIMEN 81 MG ORAL TABLET 
DELAYED RELEASEARISTADA 882 MG/3.2ML INTRAMUSCULAR PREFILLED SYRINGEABILIFY 
MAINTENA 400 MG INTRAMUSCULAR SUSPENSION RECONSTITUTED ERABILIFY 10 MG ORAL 
TABLETALCOHOL WIPES PADBLOOD GLUCOSE TEST IN VITRO 
STRIPLANCETSAllergies:AMOXICILLIN (Moderate)Orders:Oral Surgery Referral [CPT-
34244] Electronically signed by Rossy Sanabria DMD on 2019 at 2:04 
PM________________________________________________________________________ 









          Name      Value     Range     Interpretation Code Description Data Carlene

rce(s) Supporting 

Document(s)

 

                                                                       







                                        Procedure

 

                                          



                                                                                
                           



Social History

          



           Code       Duration   Value      Status     Description Data Source(s

)

 

             Smoking      2020 12:00:00 AM EDT Unknown if ever smoked comp

leted    Unknown if 

ever smoked                             [facility]



                                                                                
                 



Vital Signs

          



                    ID                  Date                Data Source

 

                    UNK                                      









           Name       Value      Range      Interpretation Code Description Data

 Source(s)

 

             Diastolic blood pressure 0 mm[Hg]                  Normal (applies 

to non-numeric results) 0 

mm[Hg]                                  [facility] ([facility])

 

             Systolic blood pressure 0 mm[Hg]                  Normal (applies t

o non-numeric results) 0 

mm[Hg]                                  [facility] ([facility])

 

                Body mass index (BMI) [Ratio] 0.00 kg/m2                      No

rmal (applies to non-numeric 

results)                  0.00 kg/m2                [facility] ([facility])

 

             Body weight Measured 0.00 lbs                  Normal (applies to n

on-numeric results) 0.00 lbs

                                        [facility] ([facility])

 

           Body height 0.00 in               Normal (applies to non-numeric resu

lts) 0.00 in    [facility] 

([facility])

## 2021-01-18 NOTE — REP
INDICATION:

CVA.



COMPARISON:

Comparison chest x-ray July 7, 2020.



TECHNIQUE:

Portable upright AP chest radiograph.



FINDINGS:

Monitoring electrodes are present.  The lungs are well inflated and clear.  The

pleural angles are sharp.  Heart size is borderline.  The aorta is tortuous.

Pulmonary vasculature is not increased..



IMPRESSION:

No active disease.





<Electronically signed by Rodrigo Martinez > 01/18/21 1663

## 2021-01-18 NOTE — REPVR
PROCEDURE INFORMATION: 

Exam: MR Angiogram Head Without Contrast, Arteries 

Exam date and time: 1/18/2021 8:03 PM 

Age: 65 years old 

Clinical indication: Pain; Headache; Patient HX: Confusion, dizziness, AMS; 

Additional info: CVA 



TECHNIQUE: 

Imaging protocol: MR angiogram head without contrast. Exam focused on the 

arteries. 

3D rendering (Not supervised by radiologist): MIP and/or 3D reconstructed 

images were created by the technologist. 



COMPARISON: 

CT Head without contrast 1/18/2021 1:19 PM 



FINDINGS: 



ANTERIOR CIRCULATION: 

Right internal carotid artery: Intracranial segment is patent with no 

significant stenosis. No aneurysm. 

Right middle cerebral artery: No occlusion or significant stenosis. No 

aneurysm. 

Right anterior cerebral artery: No occlusion or significant stenosis. No 

aneurysm. 



Left internal carotid artery: Intracranial segment is patent with no 

significant stenosis. No aneurysm. 

Left middle cerebral artery: No occlusion or significant stenosis. No aneurysm. 

Left anterior cerebral artery: Absent A1 segment on the left. A2 on the left 

segment fills via collateral flow from the right. 



POSTERIOR CIRCULATION: 

Right vertebral artery: No occlusion or significant stenosis. No aneurysm. 

Left vertebral artery: No occlusion or significant stenosis. No aneurysm. 

Basilar artery: No occlusion or significant stenosis. No aneurysm. 

Right posterior cerebral artery: Hypoplastic P1 segment on the right. 

Persistent fetal origin of the right posterior cerebral artery. 

Left posterior cerebral artery: No occlusion or significant stenosis. No 

aneurysm. 



IMPRESSION: 

No acute findings.



Electronically signed by: Alfredo Davila On 01/18/2021  21:27:27 PM

## 2021-01-18 NOTE — REP
INDICATION:

CVA - Nursing interventions must not delay CT.



COMPARISON:

None.



TECHNIQUE:

Helical scanning is acquired. 5 mm axial images were reformatted.  Coronal MPR images

were generated.



FINDINGS:

Bone window settings demonstrate an intact bony calvarium.  There is no evidence of

skull fracture or incidental bony calvarial lesion.  The visualized paranasal sinuses

appear clear.  No intraorbital abnormality is seen.  On soft tissue window setting

images; the lateral, third, and fourth ventricles are normal in size and position.

Gray-white differentiation pattern is normal above and below the tentorium.  There are

is no evidence of intracranial hemorrhage.  No mass, edema, infarction, or midline

shift is seen.  No extra-axial fluid collection is appreciated.





IMPRESSION:

Negative noncontrast head CT.





<Electronically signed by Rodrigo Martinez > 01/18/21 1219

## 2021-01-18 NOTE — CCD
Summarization Of Episode

                             Created on: 2021



JOSE R MAK

External Reference #: [id number]

: 1955

Sex: Female



Demographics





                          Address                   [address] [place] [address]

 

                          Home Phone                [phone]

 

                          Preferred Language        English

 

                          Marital Status            Single

 

                          Jewish Affiliation     EP

 

                          Race                      White

 

                          Ethnic Group              Not  or 





Author





                          Author                    [facility]

 

                          Organization              [facility]

 

                          Address                   Unknown

 

                          Phone                     Unavailable







Support





                Name            Relationship    Address         Phone

 

                RODRIGUEZDENISE VELIZ  Next Of Kin     Unknown         Unavailable

 

                    Rell FNP,  Chela Next Of Kin         [address] [place] [address]                    [phone]

 

                    Oneida ORO,  Margie Next Of Kin         [address] [place] [address]  [id number]                [phone]

 

                    Sae GTZ,  Coty Next Of Kin         [address] [place] [address]                    [phone]

 

                    Elvin RPA-C,  Raiza Next Of Kin         [address] [place] [address]                    [phone]

 

                    Nallely ANP-BC,  Deyanira Next Of Kin         [address] [place], [place] [id number]                    [phone]

 

                    ""                  Next Of Kin         [address] [place]  [address]                    [phone]

 

                DISABLED        Next Of Kin     Unknown         Unavailable

 

                UE              Next Of Kin     Unknown         Unavailable

 

                    [facility] NEWSPAPER Next Of Kin         [address] [place]  [address]                    [phone]

 

                    [place]  [address]                    [phone]







Care Team Providers





                    Care Team Member Name Role                Phone

 

                    Rell, A Chela FNP Unavailable         Unavailable

 

                    Rell, A Chela FNP Unavailable         Unavailable

 

                    Rell, A Chela FNP Unavailable         Unavailable

 

                    Rell, A Chela FNP Unavailable         Unavailable

 

                    Rell, A Chela FNP Unavailable         Unavailable

 

                    Rell, A Chela FNP Unavailable         Unavailable

 

                    Rell, A Chela FNP Unavailable         Unavailable

 

                    Rell, A Chela FNP Unavailable         Unavailable

 

                    Rell, A Chela FNP Unavailable         Unavailable

 

                    Rell, A Chela FNP Unavailable         Unavailable

 

                    Rell, A Chela FNP Unavailable         Unavailable

 

                    Rell, A Chela FNP Unavailable         Unavailable

 

                    Rell, A Chela FNP Unavailable         Unavailable

 

                    Rell, A Chela FNP Unavailable         Unavailable

 

                    Erll, A Chela FNP Unavailable         Unavailable

 

                    Rell, A Chela FNP Unavailable         Unavailable

 

                    Rell, A Chela FNP Unavailable         Unavailable

 

                    Rell, A Chela FNP Unavailable         Unavailable

 

                    Rell, A Chela FNP Unavailable         Unavailable

 

                    Rell, A Chela FNP Unavailable         Unavailable

 

                    Rell, A Chela FNP Unavailable         Unavailable

 

                    Rell, A Chela FNP Unavailable         Unavailable

 

                    Rell, A Chela FNP Unavailable         Unavailable

 

                    Erll, A Chela FNP Unavailable         Unavailable

 

                    Rell, A Chela FNP Unavailable         Unavailable

 

                    Rell, A Chela FNP Unavailable         Unavailable

 

                    Rell, A Chela FNP Unavailable         Unavailable

 

                    Dianne Car MD Unavailable         Unavailable

 

                    Dianne Car MD Unavailable         Unavailable

 

                    Dianne Car MD Unavailable         Unavailable

 

                    Dianne Car MD Unavailable         Unavailable

 

                    Dianne Car MD Unavailable         Unavailable

 

                    Dianne Car MD Unavailable         Unavailable

 

                    Dianne Car MD Unavailable         Unavailable

 

                    Dianne Car MD Unavailable         Unavailable

 

                    Dianne Car MD Unavailable         Unavailable

 

                    Dianne Car MD Unavailable         Unavailable

 

                    Dianne Car MD Unavailable         Unavailable

 

                    Dianne Car MD Unavailable         Unavailable

 

                    Dianne Car MD Unavailable         Unavailable

 

                    Dianne Car MD Unavailable         Unavailable

 

                    Dianne Car MD Unavailable         Unavailable

 

                    Dianne Car MD Unavailable         Unavailable

 

                    Dianne Car MD Unavailable         Unavailable

 

                    Dianne Car MD Unavailable         Unavailable

 

                    Dianne Car MD Unavailable         Unavailable

 

                    Dianne Car MD Unavailable         Unavailable

 

                    Dianne Car MD Unavailable         Unavailable

 

                    Dianne Car MD Unavailable         Unavailable

 

                    Dianne Car MD Unavailable         Unavailable

 

                    Dianne Car MD Unavailable         Unavailable

 

                    Dianne Car MD Unavailable         Unavailable

 

                    Dianne Car MD Unavailable         Unavailable

 

                    Dianne Car MD Unavailable         Unavailable

 

                    Dianne Car MD Unavailable         Unavailable

 

                    Dianne Car MD Unavailable         Unavailable

 

                    Dianne Car MD Unavailable         Unavailable

 

                    Dianne Car MD Unavailable         Unavailable

 

                    Dianne Car MD Unavailable         Unavailable

 

                    Dianne Cra MD Unavailable         Unavailable

 

                    Dianne Car MD Unavailable         Unavailable

 

                    Dianne Car MD Unavailable         Unavailable

 

                    Dianne Car MD Unavailable         Unavailable

 

                    Dianne Car MD Unavailable         Unavailable

 

                    Slezka,  Vojtech MD Unavailable         Unavailable

 

                    Slezka,  Vojtech MD Unavailable         Unavailable

 

                    Slezka,  Vojtech MD Unavailable         Unavailable

 

                    Slezka,  Vojtech MD Unavailable         Unavailable

 

                    Slezka,  Vojtech MD Unavailable         Unavailable

 

                    Slezka,  Vojtech MD Unavailable         Unavailable

 

                    Slezka,  Vojtech MD Unavailable         Unavailable

 

                    Slezka,  Vojtech MD Unavailable         Unavailable

 

                    Slezka,  Vojtech MD Unavailable         Unavailable

 

                    Slezka,  Vojtech MD Unavailable         Unavailable

 

                    Slezka,  Vojtech MD Unavailable         Unavailable

 

                    Slezka,  Vojtech MD Unavailable         Unavailable

 

                    Slezka,  Vojtech MD Unavailable         Unavailable

 

                    Slezka,  Vojtech MD Unavailable         Unavailable

 

                    Slezka,  Vojtech MD Unavailable         Unavailable

 

                    Slezka,  Vojtech MD Unavailable         Unavailable

 

                    Slezka,  Vojtech MD Unavailable         Unavailable

 

                    Slezka,  Vojtech MD Unavailable         Unavailable

 

                    Slezka,  Vojtech MD Unavailable         Unavailable

 

                    Slezka,  Vojtech MD Unavailable         Unavailable

 

                    [facility],  JLAFLEUR     Unavailable         Unavailable

 

                    Chela Zacarias FNP FNP Unavailable         Unavailable

 

                    EGORHO, F FRANCK FPMHNP Unavailable         Unavailable

 

                    EGORHO, F FRANCK FPMHNP Unavailable         Unavailable

 

                    EGORHO, F FRANCK FPMHNP Unavailable         Unavailable

 

                    EGORHO, F FRANCK FPMHNP Unavailable         Unavailable

 

                    EGORHO, F FRANCK FPMHNP Unavailable         Unavailable

 

                    EGORHO, F FRANCK FPMHNP Unavailable         Unavailable



                                  



Re-disclosure Warning

          The records that you are about to access may contain information from 
federally-assisted alcohol or drug abuse programs. If such information is 
present, then the following federally mandated warning applies: This information
has been disclosed to you from records protected by federal confidentiality 
rules (42 CFR part 2). The federal rules prohibit you from making any further 
disclosure of this information unless further disclosure is expressly permitted 
by the written consent of the person to whom it pertains or as otherwise 
permitted by 42 CFR part 2. A general authorization for the release of medical 
or other information is NOT sufficient for this purpose. The Federal rules 
restrict any use of the information to criminally investigate or prosecute any 
alcohol or drug abuse patient.The records that you are about to access may 
contain highly sensitive health information, the redisclosure of which is 
protected by Article 27-F of the [place] Public Health law. If you 
continue you may have access to information: Regarding HIV / AIDS; Provided by 
facilities licensed or operated by the [facility] Office of Mental Health; 
or Provided by the [facility] Office for People With Developmental 
Disabilities. If such information is present, then the following [place] 
mandated warning applies: This information has been disclosed to you from 
confidential records which are protected by state law. State law prohibits you 
from making any further disclosure of this information without the specific 
written consent of the person to whom it pertains, or as otherwise permitted by 
law. Any unauthorized further disclosure in violation of state law may result in
a fine or shelter sentence or both. A general authorization for the release of 
medical or other information is NOT sufficient authorization for further disc
losure.                                                                         
    



Allergies and Adverse Reactions

          



           Type       Description Substance  Reaction   Status     Data Source(s

)

 

                    Propensity to adverse reactions to substance nkda           

     24 HR Bupropion Hydrochloride 

150 MG Extended Release Oral Tablet                     Active              [facility]

[facility] ([facility])



                                                                                
       



Encounters

          



           Encounter  Providers  Location   Date       Indications Data Source(s

)

 

                Outpatient      Attender: FRANCK GUTIERREZ [facility] 2020 

10:00:00 AM EDT - 2020 10:00:00 AM EDT                           [facility]

 ([facility])

 

                      Attender: FRANCK LOPEZELIZABETH            2020 12:00:

00 AM EDT            [facility] ([facility])

 

                Outpatient      Attender: Dianne Car MD [email or web address]  12:00:00 AM 

EDT - 2020 02:23:26 PM EDT                           [facility]

 

           Outpatient Attender: ULISSES GTZ          2020 02:57:00 P

M EDT            [facility]

 

           Outpatient Attender: ULISSES GTZ          2020 02:54:00 P

M EDT            [facility]

 

           Outpatient Attender: ULISSES GTZ          2020 02:53:00 P

M EDT            [facility]

 

           [facility] 01:20:01 P

M EDT            [facility]

 

           Outpatient Attender: ULISSES GTZ          2020 01:18:00 P

M EDT            [facility]

 

           Outpatient Attender: ULISSES GTZ          2020 01:17:01 P

M EDT            [facility]

 

           Outpatient Attender: Chela Rell ALVAREZP FP         2020 11:1

6:03 AM EDT            [facility]

 

           Outpatient Attender: ULISSES Rell KIMP FP         2020 10:51:00 A

M EDT            [facility]

 

           Outpatient Attender: ULISSES Rell FNP FP         2020 10:50:00 A

M EDT            [facility]

 

           Outpatient Attender: ULISSES ALVAREZP FP         07/15/2020 05:06:01 P

M EDT            [facility]

 

           Outpatient Attender: Chela Rell ALVAREZP FP         07/15/2020 03:4

2:00 PM EDT            [facility]

 

           Outpatient Attender: ULISSES ALVAREZP FP         07/15/2020 11:50:00 A

M EDT            [facility]

 

           Outpatient Attender: ULISSES ALVAREZP FP         2020 12:07:01 P

M EDT            [facility]

 

           Outpatient Attender: ULISSES Zacarias FNP FP         2020 09:36:01 A

M EDT            [facility]

 

           Outpatient Attender: Chela Zacarias KIMP FP         06/10/2020 03:4

4:00 PM EDT            [facility]

 

           Outpatient Attender: ULISSES ALVAREZP FP         2020 02:47:00 P

M EDT            [facility]

 

           Outpatient Attender: ULISSES Zacarias FNP FP         2020 08:01:14 P

M EDT            [facility]

 

           Outpatient Attender: ULISSES ALVAREZP FP         2019 09:01:01 P

M [facility]

 

           Outpatient Attender: ULISSES ALVAREZP FP         2019 08:40:00 A

M [facility]

 

           Outpatient Attender: CARSON HERNANDEZ FP         2019 09:01:01 PM 

[facility]

[facility]

 

           Outpatient Attender: CARSON LAW FP         2019 03:04:01 PM 

[facility]

[facility]

 

           Outpatient Attender: CARSON LAW FP         2019 03:00:01 PM 

[facility]

[facility]

 

           Outpatient Attender: CARSON LAW FP         2019 02:59:00 PM 

[facility]

[facility]

 

           Outpatient Attender: CARSON LAW FP         2019 02:06:03 PM 

[facility]

 

           [facility]         [facility] 02:05:01 PM 

[facility]

 

           [facility]: [facility]         2019 01:10:01 PM 

[facility]

 

           [facility]: [facility]         2019 01:08:01 PM 

[facility]

 

           [facility] Att[facility]: [facility]         2019 01:06:00 PM 

[facility]



                                                                                
                                                                                
                                                                                
                                                                                
                                                      



Functional Status

                                                                                
                 



Medications

          



          Medication Brand Name Start Date Product Form Dose      Route     Admi

nistrative 

Instructions Pharmacy Instructions Status     Indications Reaction   Description

 Data 

Source(s)

 

          10 mg               2020 12:00:00 AM EDT tablet    30           

       TAKE ONE TABLET BY MOUTH EVERY 

DAY        TAKE ONE TABLET BY MOUTH EVERY DAY SOLD: 2020                  

                [facility]

 

          400 mg              2020 12:00:00 AM EDT suspension,extended rel

 syring 1                   INJECT 

400MG INTRAMUSCULARLY ONCE EVERY FOUR WEEKS INJECT 400MG INTRAMUSCULARLY ONCE 

EVERY FOUR WEEKS SOLD: 2020                                        [facility]

 

           aripiprazole 10 MG Oral Tablet aripiprazole 2020 12:00:00 AM ED

T            10 mg      

by mouth             completed             [id number] aripiprazole by mouth [id number]

2020 

09/10/2020 once a day 30    10 mg tablet             [id number] Farzad fernández Egorho 

[id number]          Nurse Practitioner                      [facility]

 

          10 mg               2020 12:00:00 AM EDT tablet    7            

       TAKE ONE TABLET BY MOUTH EVERY DAY

AT BEDTIME FOR MOOD       TAKE ONE TABLET BY MOUTH EVERY DAY AT BEDTIME FOR MOOD

 SOLD:

2020                                                      [facility]

 

          400 mg              2020 12:00:00 AM EDT suspension,extended rel

 syring 1                   INJECT 

400MG INTRAMUSCULARLY ONCE EVERY 4 WEEKS FOR MOOD INJECT 400MG INTRAMUSCULARLY 

ONCE EVERY 4 WEEKS FOR MOOD SOLD: 2020                                    

    [facility]

 

          10 mg               2020 12:00:00 AM EDT tablet    7            

       TAKE ONE TABLET BY MOUTH EVERY DAY

AT BEDTIME FOR MOOD       TAKE ONE TABLET BY MOUTH EVERY DAY AT BEDTIME FOR MOOD

 SOLD:

2020                                                      [facility]

 

          10 mg               2020 12:00:00 AM EDT tablet    7            

       TAKE ONE TABLET BY MOUTH EVERY DAY

AT BEDTIME FOR MOOD       TAKE ONE TABLET BY MOUTH EVERY DAY AT BEDTIME FOR MOOD

 SOLD:

2020                                                      [facility]



                                                                                
                                               



Insurance Providers

          



             Payer name   Policy type / Coverage type Policy ID    Covered party

 ID Covered 

party's relationship to flaherty Policy Flaherty             Plan Information

 

          [employer]           [id number]           SP          

        [id number]

 

          MEDICARE            [id number]           SP                  [id number]

[id number]

 

          MEDICARE COMPLETE           [id number]           SP                  [id number]

[id number]

 

          MEDICARE            [id number]           SP                  [id number]

[id number]

 

          [facility] MEDICARE           [id number]           Hilary                 [id number]

[id number]

 

          [facility] [employer] P         [id number]           S      

             [id number]

 

          Medicaid  S         [id number]            S                   [id number]

 

          [facility] [employer] P         [id number]           S      

             [id number]

 

          [employer]ED[employer]              [id number]            SP                  [id number]

 

          [facility] MCRO           [id number]           SP             

     [id number]

 

          [facility] [employer] P         [id number]           S      

             [id number]

 

          MEDICAID            [id number]            SP                  [id number]

 

          MEDICAID  M         [id number]            S                   [id number]

 

          MEDICARE  C         [id number]           S                   [id number]

A

 

          [facility]           [id number]           SP             

     [id number]

 

          MEDICAID            [id number]            SP                  [id number]

 

          [facility] [employer] P         [id number]           S      

             [id number]

 

          Medicaid  S         [id number]            S                   [id number]

 

          SELF PAY ONLY           [id number]           SP                  [id number]

[id number]

 

          [facility] [employer] P         [id number]           S      

             [id number]

 

          Medicare  S         [id number]           S                   [id number]

A

 

          MEDICARE            [id number]           SP                  [id number]

A

 

          Managed Care - Community Plan [facility] P         [id number]   

        S                   [id number]

 

          Medicare  P         [id number]           S                   [id number]

A

 

          Medicaid  S         [id number]            S                   [id number]

 

          Managed Care - Community Plan [employer] P         [id number]   

        S                   [id number]

 

          [facility]           [id number]           SP           

       [id number]

 

          Managed Care - Community Plan [employer] P         [id number]   

        S                   [id number]

 

          Medicaid  S         [id number]            S                   [id number]

 

          Managed Care BCBS O         [id number]           S                  

 [id number]

 

          [facility]           [id number]           SP            

      [id number]

 

          HMO BLUE            [id number]           SP                  [id number]

[id number]

 

                                                                       



                                                                                
                                                                                
                                                                                
                                                                                
                                                                                
   



Problems, Conditions, and Diagnoses

          



           Code       Display Name Description Problem Type Effective Dates Data

 Source(s)

 

                    F32.0               Major depressive disorder, single episod

e, mild Major Depressive Disorder,

Single episode, Mild Condition           2020 12:00:00 AM EDT [facility] ([facility] 

[facility])

 

                          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

 due to a substance or known physiological 

condition                 Other Specified Schizophrenia Spectrum and Other Psych

otic Disorder 

Condition                 2020 12:00:00 AM EDT [facility] ([facility])

 

           525.10     Teeth extraction Teeth extraction            2019 02

:04:23 PM EST [facility]

 

             I48.92       Unspecified atrial flutter Unspecified atrial flutter 

Diagnosis    

2020 12:54:58 PM EDT              [facility]



                                                                                
                                               



Surgeries/Procedures

          



             Procedure    Description  Date         Indications  Data Source(s)

 

                    OFFICE OUTPATIENT VISIT 15 MINUTES                      12:00:00 AM EDT - 2020 

12:00:00 AM EDT                                     [facility] ([facility])

 

             OFFICE OUTPATIENT VISIT 15 MINUTES              2020 12:00:00

 AM EDT              [facility] ([facility])



                                                                                
                 



Results

          



                    ID                  Date                Data Source

 

                    [id number]    2020 10:48:24 AM EDT [facility]

 

                                        Measurements & CalculationsHeight:      

       67 inches (5 ft. 7 in.) 170.18 cm

   Weight:             273 pounds      124.09 kg    Body Mass Index (BMI):  
42.91BMI Interpretation:         Morbidly ObeseBody Surface Area (BSA):    
2.31Weight Management Education Done (Nutrition/Physical Activity)Vital 
SignsTemperature:        98.4FPulse Rate:         76 beats/minuteRespiratory 
Rate:   18 respirations/minuteBlood Pressure:     113/74     O2 Saturation:  94%
        Vital Signs performed by: Allyn Liu MA,  2020 10:59 
AMInitial Intake Information From: patientRoom #: 14Infectious Disease / Travel 
ScreeningRecent travel for you or any close contacts? NoHave you had any close 
contact with anyone diagnosed with or under investigation for COVID-19 
(coronavirus)? NoFever? NoRespiratory symptoms: cough, cold, congestion, 
shortness of breath, difficulty breathing? NoLoss of smell? NoLoss of taste? 
NoSmoking, Tobacco, Vaping or Smoke Exposure StatusSmoke Status: former 
smokerTobacco Use: NoDo you vape? NoMenstrual HistoryAny possibility of 
pregnancy? NoHealthcare HistorySince your last office visit...Have you been 
ad[facility] the [facility]Have you been to an emergency room (ER) 
or urgent care clinic? NoHave you seen another healthcare  provider? NoHave you 
seen a dentist? NoIntake performed by:  Allyn Liu MA,  2020 10:51 
AMRate Your HealthIn general, would you say your health is? Very GoodPain 
AssessmentAre you currently having any pain which...    You would like your 
provider to address? No    Affects your activity level? NoDepression Screening -
PHQ-2Over the last two weeks, have you...    Had little interest or pleasure in 
doing things? Several days    Been feeling down, depressed, or hopeless? Several
days PHQ-2 Score: 2Anxiety Screening - NINFA-2Over the last two weeks, have you 
been...    Feeling nervous, anxious, or on edge? Several days NINFA-2 Score: 
3Generalized Anxiety Disorder 7-Item Screening (NINFA-7)Answer Guide:0 = Not at 
all1 = Several days2 = Over half the days3 = Nearly every dayOver the last 2 
weeks, how often have you been bothered by the following problems?Feeling 
nervous, anxious, or on edge: 1Not being able to stop or control worryinWorrying too much about different things: 2Trouble relaxinBeing so restless
that it's hard to sit still: 0Becoming easily annoyed or irritable: 2Feeling 
afraid as if something awful might happen: 1Answer Guide:0 = Not difficult at 
all1 = Somewhat difficult2 = Very difficult3 = Extremely difficultHow difficult 
have these made it for you to do your work, take care of things at home, or get 
along with other people? 1GAD-7 Screening Results NINFA-2 Score: 3GAD-7 Score: 
9Functional Impairment: Somewhat difficultRecommendation: Mild anxietyPHQ-9 1.  
Over the last 2 weeks, patient reports the following frequency of symptoms:     
a. Little interest or pleasure in doing things                               -
Several days    b. Feeling down, depressed, or hopeless                         
        -Several days    c. Trouble falling asleep, staying asleep, or sleeping 
too much   -Not at all    d. Feeling tired or having little energy              
                        -Several days    e. Poor appetite or overeating         
                                        -Nearly every day    f.  Feeling bad 
about yourself, feeling that you are a failure, or feeling that you have let 
yourself or your        family down                                             
                         -Nearly every day    g. Trouble concentrating on things
such as reading the newspaper        or watching television                     
                                    -Several days    h. Moving or speaking so 
slowly that other people could have noticed. Or being so fidgety or restless 
that        you have been moving around a lot more than usual               -Not
at all     i. Thinking that you would be better off dead or that you want       
 to hurt yourself in some way                                               -Not
at all2. If you checked off any problems, how difficult have these problems made
it for you to do your work, take       care of things at home, or get along with
other people?           -Somewhat DifficultToday's PHQ-9 Results    Score: 10   
Severity: Moderate    Diagnosis Recommendation: No recommendation    Functional 
Impairment: Somewhat DifficultScreening, Brief Intervention, & Referral to 
Treatment (SBIRT)Pre-Screening Questions How many times have you have 4 or more 
drinks in a day? 0How many times have you used an illegal drug or used a 
prescription medication for a non-medical reason? 0Performed by: Allyn Liu MA,  2020 10:51 AMPatient History Social/Personal History: Chief 
ComplaintHDHistory of Present Illness (HPI)Recently hospitalized for worsenng of
her schizophrenia. Started on Abilify and doing better now. [facility] 
in one week. No thoughts of self harm.Also had atrial flutter as a an inpatient 
and this was assessed. She has an appointment with her Cardiologist in 2 weeks. 
Has had issues in the past with atrial fibrillation. Currently asymptomatic.HPI 
performed by: Guido Castillo MD,  2020 11:03 AMTransitions of Care Inbound
Problem ReviewProblem List was reviewed and/or updated during this 
visit.Medication Reconciliation & ReviewMedication List was reviewed and/or 
updated during this visit, including review of any over-the-counter medications,
herbal therapies, and/or supplements.Allergy ReviewAllergy List was reviewed 
and/or updated during this visit.Adult Preventive CareLabs/Meds/Other 
Counseling-Nutrition and Physical Activity:BMI Interpretation: Morbidly Obese 
(2020)  Counseling: Done (2020)  Physical Activity: Done 
(2020)Cancer Screening  Mammogram Reviewed: Previous Comments: 2-3years 
ago unsure of where (2018)Today's Comments: SMCColorectal Screening: 
Patient refused colorectal screeningReview of Systems General: Denies chills, 
dizziness, fatigue, fever.  Cardiovascular: Denies chest pain.  Respiratory: 
Denies shortness of breath.  Gastrointestinal: Denies constipation.  
Genitourinary: Denies burning with urination.  Physical ExamGeneral Appearance: 
well nourished, well hydrated, no acute distressRespiratory, Auscultation: clear
to auscultation bilaterally; no rales, rhonchi, or wheezesRespiratory, Effort: 
no intercostal retractions or use of accessory musclesCardiovascular, Auscu
ltation: S1, S2 audible; no murmur, rub, or gallop; RRRGait & Station: 
normalSkin, Inspection: no rashes, lesions, or ulcerationsOrientation: oriented 
to time, place, and personMood & Affect: no depression, anxiety, or 
agitationJudgment & Insight: intactCare Management Plan Transitions of 
CareInboundRate Your HealthIn general, would you say your health is? Very 
GoodAssessment & Plan Problems:Assessed:Schizophrenia (ICD-295.90) (VMR93-F98.9)
  Assessment:    Instructions: With recent hospital admit.Doing much better 
now.Continue current plan and recheck as scheduled with Psychiatrist next 
week.Unspecified atrial fibrillation (YXB71-O58.91)   Assessment:    
Instructions: With recent flare of this while inpatient.Currently symptoms 
free.Keep appointment in 2 weeks with Cardiologist.Patient Instructions/Care 
Plan: Schizophrenia: With recent hospital admit.Doing much better now.Continue 
current plan and recheck as scheduled with Psychiatrist next week.Unspecified 
atrial fibrillation: With recent flare of this while inpatient.Currently 
symptoms free.Keep appointment in 2 weeks with Cardiologist.----------Plan 
developed in collaboration with patient and/or familyMedications:ABILIFY MA
INTENA 400 MG INTRAMUSCULAR PREFILLED SYRINGEABILIFY 10 MG ORAL TABLETADULT 
ASPIRIN REGIMEN 81 MG ORAL TABLET DELAYED RELEASEALCOHOL WIPES PADBLOOD GLUCOSE 
TEST IN VITRO STRIPLANCETSMedication Changes:Added: ABILIFY 10 MG ORAL 
TABLETABILIFY MAINTENA 400 MG INTRAMUSCULAR PREFILLED SYRINGERemoved:CARVEDILOL 
12.5 MG ORAL TABLET-by mouth, twice a day for htn, DOCUSATE SODIUM 100 MG ORAL 
CAPSULE-by mouth, twice a day for constipation, TRAZODONE HCL 50 MG ORAL TABLET-
by mouth, at bedtime as needed for insomnia, ABILIFY 10 MG ORAL TABLET-by mouth 
twice daily for mood, ABILIFY MAINTENA 400 MG INTRAMUSCULAR SUSPENSION 
RECONSTITUTED ER-IM, every 4 weeks for psychosis for 28 days, ARISTADA 882 
MG/3.2ML INTRAMUSCULAR PREFILLED SYRINGE-IM, every 8 weeksAllergies:AMOXICILLIN 
(Moderate)Orders:Adult - Ofc Vst, EST, Level III [CPT-38458] Electronically 
signed by Guido Castillo MD on 2020 at 11:15 
AM________________________________________________________________________ 









          Name      Value     Range     Interpretation Code Description Data Carlene

rce(s) Supporting 

Document(s)

 

                                                                       









                    ID                  Date                Data Source

 

                    [id number]    2019 01:06:02 PM [facility]

 

                                        Current Problems: Teeth extraction (ICD-

525.10) (DWT13-N86.499)depression (ICD-

311) (URY36-U42.9)Bipolar disorder (ICD-296.80) (GEB65-C87.9)Schizophrenia (ICD-
295.90) (OOB68-D95.9)Unspecified atrial fibrillation (ORA48-A49.91)BMI 38.0-38.9
(ICD-V85.38) (MQC87-K78.38)Localized swelling, mass and lump, left lower limb 
(ICD-729.81) (BEO15-J57.42)Cellulitis of lower limb (ICD-682.6) 
(QCO98-O96.119)Constipation (ICD-564.00) (ADT89-W84.00)upper respiratory 
infection (ICD-465.9) (JJK41-F88.9)Vaccination (ICD-V05.9) (ICD10-
Z23)Unspecified idiopathic peripheral neuropathy (ICD-356.9) 
(XZZ98-H94.9)Dysthymic Disorder (ICD-300.4) (KOY55-B07.1)Psychotic Disorder NOS 
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
Screening (ICD-V70.0) (YUM76-S46.9)PHARYNGITIS (ICD-462) (XLP45-V63.9)SHINGLES 
(ICD-053.9) (XEO05-J60.9)SPECIAL SCREENING FOR MALIGNANT NEOPLASMS COLON (ICD-
V76.51) (EGF59-H20.11)TONGUE DISORDER (ICD-529.9) (FMV31-S80.9)OBESITY (ICD-
278.00) (ZLB22-O45.9)NONDEPENDENT TOBACCO USE DISORDER (ICD-305.1) (ICD10-
Z72.0)OTHER DISORDERS OF BONE AND CARTILAGE OTHER (ICD-733.99)OTHER SCREENING 
BREAST EXAMINATION (ICD-V76.19) (WJB70-G35.39)SCREENING FOR MALIGNANT NEOPLASM 
OF THE CERVIX (ICD-V76.2) (UIW72-F04.4)HYPERLIPIDEMIA (ICD-272.4) (ICD10-
E78.5)PSORIASIS, FEET (ICD-696.1) (SKH53-Z79.9)ECZEMA, ATOPIC (ICD-691.8) 
(IMU66-Z73.9)UNSPECIFIED ADJUSTMENT REACTION (ICD-309.9) (YHW21-B45.20)VITAMIN D
DEFICIENCY (ICD-268.9) (PME93-L77.9)THYROID NODULE (ICD-241.0) (ICD10-
E04.1)CALLUSES, FEET, BILATERAL (ICD-700) (KXB67-S02)DIABETES, TYPE 2 (ICD-
250.00) (EHC02-U70.9)Current Medications: CARVEDILOL 12.5 MG ORAL TABLET 
(CARVEDILOL) by mouth, twice a day for htn; Route: ORALDOCUSATE SODIUM 100 MG 
ORAL CAPSULE (DOCUSATE SODIUM) by mouth, twice a day for constipation; Route: 
ORALTRAZODONE HCL 50 MG ORAL TABLET (TRAZODONE HCL) by mouth, at bedtime as 
needed for insomnia; Route: ORALADULT ASPIRIN REGIMEN 81 MG ORAL TABLET DELAYED 
RELEASE (ASPIRIN) by mouth, daily for cad; Route: ORALARISTADA 882 MG/3.2ML 
INTRAMUSCULAR PREFILLED SYRINGE (ARIPIPRAZOLE LAUROXIL) IM, every 8 weeks; 
Route: INTRAMUSCULARABILIFY MAINTENA 400 MG INTRAMUSCULAR SUSPENSION 
RECONSTITUTED ER (ARIPIPRAZOLE) IM, every 4 weeks for psychosis for 28 days; 
Route: INTRAMUSCULARABILIFY 10 MG ORAL TABLET (ARIPIPRAZOLE) by mouth twice 
daily for mood; Route: ORALALCOHOL WIPES PAD (ALCOHOL SWABS) use as directed to 
test blood sugar twice weekly. DX:250.00BLOOD GLUCOSE TEST IN VITRO STRIP 
(GLUCOSE BLOOD) use to test blood glucose daily Dx code 250.00LANCETS (LANCETS) 
test twice weekly, once before breakfast, and once before dinner. Dx 
250.00Current Allergies: AMOXICILLIN (Moderate)                           Dental
Chart:  Procedures:Type - CDT Code - Description B - () Limited oral 
evaluation - problem focused on Tooth # 12 (Performed by Rossy Sanabria DMD) B - 
() Intraoral, periapical, first radiographic image on Tooth # 12 (Performed
by Rossy Sanabria DMD) Treatments:Type - CDT Code - Description T - () Ex
traction, erupted tooth or exposed root (elevation and/or forceps removal) on 
Tooth # 12 (Performed by Rossy Sanabria DMD)       Chart Notes:pola (2019  
2:04PM): S: CC: "I I am having a very bad toothache on the top left side. The 
tooth is changing color starting last week."HPI: last week. O: RMHx (-)per pt. 
has type 2 diabetes, depression and Schizophrenia. per pt. has decided to stop s
[facility] medical for any further treatment and has stopped taking all medications 
as of last year.  BP: 124/67. PA taken. A: Discussed with pt.. concerns of not 
taking medications for Diabetes type 2 and all other medications that are needed
 Offered pt. that we could help set up appt. to see primary care and pt. denied 
any help from us at this time. #12 decayed into the pulp.  Advised pt. to have 
extracted.  Discussed with pt. as we are unware of H1AC levels, referral to OS 
would be the best option.  No signs of infection at this time. P: Placed 
referral to OS. Informed Pt about new pain management policy of the clinic 
regarding about narcotic,told pt to alternate Ibuprophen 600- 800mg and tylenol 
500mg every 4 to 6 hrs for pain when neededAssisted By:AM.NV: new p/gorge.Rossy Sanabria DMD by pola (2019 2:04 PM): Tooth Notes and Watches: Assessment & Plan 
Problems:Added: Teeth extraction (ICD-525.10) (ICD10-
K08.499)Medications:CARVEDILOL 12.5 MG ORAL TABLETDOCUSATE SODIUM 100 MG ORAL 
CAPSULETRAZODONE HCL 50 MG ORAL TABLETADULT ASPIRIN REGIMEN 81 MG ORAL TABLET 
DELAYED RELEASEARISTADA 882 MG/3.2ML INTRAMUSCULAR PREFILLED SYRINGEABILIFY 
MAINTENA 400 MG INTRAMUSCULAR SUSPENSION RECONSTITUTED ERABILIFY 10 MG ORAL 
TABLETALCOHOL WIPES PADBLOOD GLUCOSE TEST IN VITRO 
STRIPLANCETSAllergies:AMOXICILLIN (Moderate)Orders:Oral Surgery Referral [CPT-
74987] Electronically signed by Rossy Sanabria DMD on 2019 at 2:04 
PM________________________________________________________________________ 









          Name      Value     Range     Interpretation Code Description Data Carlene

rce(s) Supporting 

Document(s)

 

                                                                       







                                        Procedure

 

                                          



                                                                                
                           



Social History

          



           Code       Duration   Value      Status     Description Data Source(s

)

 

             Smoking      2020 12:00:00 AM EDT Unknown if ever smoked comp

leted    Unknown if 

ever smoked                             [facility] ([facility])



                                                                                
                 



Vital Signs

          



                    ID                  Date                Data Source

 

                    UNK                                      









           Name       Value      Range      Interpretation Code Description Data

 Source(s)

 

             Diastolic blood pressure 0 mm[Hg]                  Normal (applies 

to non-numeric results) 0 

mm[Hg]                                  [facility] ([facility])

 

             Systolic blood pressure 0 mm[Hg]                  Normal (applies t

o non-numeric results) 0 

mm[Hg]                                  [facility] ([facility])

 

                Body mass index (BMI) [Ratio] 0.00 kg/m2                      No

rmal (applies to non-numeric 

results)                  0.00 kg/m2                [facility] ([facility])

 

             Body weight Measured 0.00 lbs                  Normal (applies to n

on-numeric results) 0.00 lbs

                                        [facility] ([facility])

 

           Body height 0.00 in               Normal (applies to non-numeric resu

lts) 0.00 in    [facility] 

([facility])

## 2021-01-18 NOTE — REPVR
PROCEDURE INFORMATION: 

Exam: MR Head Without Contrast 

Exam date and time: 1/18/2021 8:03 PM 

Age: 65 years old 

Clinical indication: Altered mental status/memory loss and dizziness; Patient 

HX: Confusion, dizziness, AMS; Additional info: CVA 



TECHNIQUE: 

Imaging protocol: MR of the head without contrast. 



COMPARISON: 

CT Head without contrast 1/18/2021 1:19 PM 



FINDINGS: 

Brain: Normal. No acute infarct. No hemorrhage. No significant white matter 

disease. No edema.  

Cerebral ventricles: Normal. No ventriculomegaly. 

Bones/joints: Unremarkable. 

Paranasal sinuses: Normal as visualized. No acute sinusitis. 

Mastoid air cells: Normal as visualized. No mastoid effusion. 

Orbital cavity: Unremarkable. 

Soft tissues: Unremarkable. 



IMPRESSION: 

No acute findings. 



Electronically signed by: Alfredo Davila On 01/18/2021  21:23:45 PM

## 2021-01-19 VITALS — SYSTOLIC BLOOD PRESSURE: 136 MMHG | DIASTOLIC BLOOD PRESSURE: 67 MMHG

## 2021-01-19 VITALS — DIASTOLIC BLOOD PRESSURE: 61 MMHG | SYSTOLIC BLOOD PRESSURE: 110 MMHG

## 2021-01-19 LAB
BUN SERPL-MCNC: 14 MG/DL (ref 7–18)
CALCIUM SERPL-MCNC: 8.5 MG/DL (ref 8.8–10.2)
CHLORIDE SERPL-SCNC: 103 MEQ/L (ref 98–107)
CO2 SERPL-SCNC: 30 MEQ/L (ref 21–32)
CREAT SERPL-MCNC: 0.72 MG/DL (ref 0.55–1.3)
EST. AVERAGE GLUCOSE BLD GHB EST-MCNC: 151 MG/DL (ref 60–110)
GFR SERPL CREATININE-BSD FRML MDRD: > 60 ML/MIN/{1.73_M2} (ref 45–?)
GLUCOSE SERPL-MCNC: 137 MG/DL (ref 70–100)
HCT VFR BLD AUTO: 42.5 % (ref 36–47)
HGB BLD-MCNC: 13.4 G/DL (ref 12–15.5)
MCH RBC QN AUTO: 29.1 PG (ref 27–33)
MCHC RBC AUTO-ENTMCNC: 31.5 G/DL (ref 32–36.5)
MCV RBC AUTO: 92.2 FL (ref 80–96)
PLATELET # BLD AUTO: 186 10^3/UL (ref 150–450)
POTASSIUM SERPL-SCNC: 4.2 MEQ/L (ref 3.5–5.1)
RBC # BLD AUTO: 4.61 10^6/UL (ref 4–5.4)
SODIUM SERPL-SCNC: 140 MEQ/L (ref 136–145)
VIT B12 SERPL-MCNC: 309 PG/ML (ref 247–911)
WBC # BLD AUTO: 10.5 10^3/UL (ref 4–10)

## 2021-01-19 NOTE — DS.PDOC
Discharge Summary


General


Date of Admission


Jan 18, 2021 at 17:24


Date of Discharge


1/19/21





Discharge Summary


DISCHARGE DIAGNOSES:


Peripheral vertigo


Paroxysmal Atrial Flutter


medical noncompliance 


Schizophrenia


OA


Chronic back pain


Depression/anxiety





DISCHARGE MEDICATIONS: 


see below





DISCHARGE INSTRUCTIONS:


Cardiologist re: pAfib noncompliant w eliquis and metoprolol 5days fu appt


PCP 5days fu appt








HOSPITAL COURSE: 





65-year-old female with history of medical noncompliance, schizophrenia, paroxys

mal atrial flutter, noncompliant with eliquis. osteoarthritis, chronic back 

pain, depression, anxiety, osteoarthritis, abdominal hernia and history of tubal

ligation presented to the emergency room with complaints of ataxia for 2 days 

with complaints of dizziness, poor balance and tinnitus in the left ear. 

Evaluation included CT of the head, MRI of the brain, all of which were 

negative. Electrolytes were negative and orthostasis was negative





Vertigo


-Most likely peripheral vertigo with negative findings on MRI, MRA of the brain 

for acute  cerebellar CVA


-PT, OT  consulted 


-As needed, meclizine





Paroxysmal atrial flutter


-Rate controlled


-Per Dr. Car's note from previous admission, recommendations include 

metoprolol and eliquis.





Schizophrenia, depression, anxiety


-May resume home medications once confirmed by [facility] pharmacy





Medical noncompliance


-Home care referral





Osteoarthritis with chronic back pain


-Did not complain of any symptoms during this admission





DISCHARGE PHYSICAL EXAMINATION:


VITAL SIGNS: Please see below. 


GENERAL: Awake, alert, oriented 3, answering questions appropriately. No 

respiratory distress


HEENT: No JVD, no thyromegaly, no cervical lymphadenopathy. Moist mucous 

membranes


CARDIOVASCULAR: S1, S2, irregularly irregular, not tachycardic. No carotid bruit


RESPIRATORY: Clear to auscultation. Wheezing or rales


ABDOMINAL: Positive bowel sounds, soft, nontender, nondistended


EXTREMITIES: No cyanosis or clubbing


NEURO: . Her motor function is 5 / 54 extremities. . No sensory disturbance


Mild dysmetria on finger to nose testing with action tremors. No pronator drift


Negative Babinski bilaterally





LABORATORY DATA, IMAGING STUDIES, MICROBIOLOGY: Please see below.





Portable upright AP chest radiograph.





FINDINGS:


Monitoring electrodes are present.  The lungs are well inflated and clear.  The


pleural angles are sharp.  Heart size is borderline.  The aorta is tortuous.


Pulmonary vasculature is not increased..





IMPRESSION:


No active disease.








<Electronically signed by Rodrigo Avila 01/18/21 1340





Exam: MR Head Without Contrast 


Exam date and time: 1/18/2021 8:03 PM 


Age: 65 years old 


Clinical indication: Altered mental status/memory loss and dizziness; Patient 


HX: Confusion, dizziness, AMS; Additional info: CVA 





TECHNIQUE: 


Imaging protocol: MR of the head without contrast. 





COMPARISON: 


CT Head without contrast 1/18/2021 1:19 PM 





FINDINGS: 


Brain: Normal. No acute infarct. No hemorrhage. No significant white matter 


disease. No edema.  


Cerebral ventricles: Normal. No ventriculomegaly. 


Bones/joints: Unremarkable. 


Paranasal sinuses: Normal as visualized. No acute sinusitis. 


Mastoid air cells: Normal as visualized. No mastoid effusion. 


Orbital cavity: Unremarkable. 


Soft tissues: Unremarkable. 





IMPRESSION: 


No acute findings. 





Electronically signed by: Alfredo Davila On 01/18/2021  21:23:45 PM





Exam: MR Angiogram Head Without Contrast, Arteries 


Exam date and time: 1/18/2021 8:03 PM 


Age: 65 years old 


Clinical indication: Pain; Headache; Patient HX: Confusion, dizziness, AMS; 


Additional info: CVA 





TECHNIQUE: 


Imaging protocol: MR angiogram head without contrast. Exam focused on the 


arteries. 


3D rendering (Not supervised by radiologist): MIP and/or 3D reconstructed 


images were created by the technologist. 





COMPARISON: 


CT Head without contrast 1/18/2021 1:19 PM 





FINDINGS: 





ANTERIOR CIRCULATION: 


Right internal carotid artery: Intracranial segment is patent with no 


significant stenosis. No aneurysm. 


Right middle cerebral artery: No occlusion or significant stenosis. No 


aneurysm. 


Right anterior cerebral artery: No occlusion or significant stenosis. No 


aneurysm. 





Left internal carotid artery: Intracranial segment is patent with no 


significant stenosis. No aneurysm. 


Left middle cerebral artery: No occlusion or significant stenosis. No aneurysm. 


Left anterior cerebral artery: Absent A1 segment on the left. A2 on the left 


segment fills via collateral flow from the right. 





POSTERIOR CIRCULATION: 


Right vertebral artery: No occlusion or significant stenosis. No aneurysm. 


Left vertebral artery: No occlusion or significant stenosis. No aneurysm. 


Basilar artery: No occlusion or significant stenosis. No aneurysm. 


Right posterior cerebral artery: Hypoplastic P1 segment on the right. 


Persistent fetal origin of the right posterior cerebral artery. 


Left posterior cerebral artery: No occlusion or significant stenosis. No 


aneurysm. 





IMPRESSION: 


No acute findings.





Electronically signed by: Alfredo Davila On 01/18/2021  21:27:27 PM








TIME SPENT ON DISCHARGE: 30 MINUTES





Vital Signs/I&Os





Vital Signs








  Date Time  Temp Pulse Resp B/P (MAP) Pulse Ox O2 Delivery O2 Flow Rate FiO2


 


1/19/21 09:30  66  136/77 (96)    





  66  128/66 (86)    





  66  124/67 (86)    


 


1/19/21 06:00 98.0  18  95   


 


1/18/21 21:50      Room Air  














I&O- Last 24 Hours up to 6 AM 


 


 1/19/21





 06:00


 


Intake Total 750 ml


 


Output Total 200 ml


 


Balance 550 ml











Laboratory Data


Labs 24H


Laboratory Tests 2


1/19/21 07:35: 


Nucleated Red Blood Cells % (auto) 0.0, Anion Gap 7L, Glomerular Filtration Rate

> 60.0, Estimated Mean Plasma Glucose 151H, Hemoglobin A1c 6.9, Calcium Level 

8.5L, Vitamin B12 Level 309


CBC/BMP


Laboratory Tests


1/19/21 07:35











Discharge Medications


Scheduled


Apixaban (Eliquis) 5 Mg Tablet, 5 MG PO BID





Scheduled PRN


Meclizine HCl (Meclizine HCl) 25 Mg Tablet, 25 MG PO TIDP PRN for dizziness


Metoprolol Tartrate (Lopressor) 50 Mg Tablet, 50 MG PO BIDP PRN for hr>120





Allergies


Coded Allergies:  


     levofloxacin (Verified  Adverse Reaction, Unknown, DIARRHEA, 7/7/20)











NICHO BOWDEN MD            Jan 19, 2021 17:20

## 2021-01-19 NOTE — IPNPDOC
Date Seen


The patient was seen on 1/19/21.





Progress Note


SUBJECTIVE: Patient was seen and examined at the bedside chart it's been 

reviewed. 


She says that she feels better and back to her baseline. 


MRI of the brain, MRA of the brain have no acute findings. 


Orthostatics were negative. 


 She denies any dizziness, lightheadedness





OBJECTIVE


PHYSICAL EXAMINATION:


VITAL SIGNS: Please see below. 


GENERAL: Awake, alert, oriented 3, answering questions appropriately. No 

respiratory distress


HEENT: No JVD, no thyromegaly, no cervical lymphadenopathy. Moist mucous 

membranes


CARDIOVASCULAR: S1, S2, irregularly irregular, not tachycardic. No carotid bruit


RESPIRATORY: Clear to auscultation. Wheezing or rales


ABDOMINAL: Positive bowel sounds, soft, nontender, nondistended


EXTREMITIES: No cyanosis or clubbing


NEURO: . Her motor function is 5 / 54 extremities. . No sensory disturbance


Mild dysmetria on finger to nose testing with action tremors. No pronator drift


Negative Babinski bilaterally





LABORATORY DATA, IMAGING STUDIES, MICROBIOLOGY: Please see below.





Portable upright AP chest radiograph.





FINDINGS:


Monitoring electrodes are present.  The lungs are well inflated and clear.  The


pleural angles are sharp.  Heart size is borderline.  The aorta is tortuous.


Pulmonary vasculature is not increased..





IMPRESSION:


No active disease.








<Electronically signed by Rodrigo Martinez > 01/18/21 1340





Exam: MR Head Without Contrast 


Exam date and time: 1/18/2021 8:03 PM 


Age: 65 years old 


Clinical indication: Altered mental status/memory loss and dizziness; Patient 


HX: Confusion, dizziness, AMS; Additional info: CVA 





TECHNIQUE: 


Imaging protocol: MR of the head without contrast. 





COMPARISON: 


CT Head without contrast 1/18/2021 1:19 PM 





FINDINGS: 


Brain: Normal. No acute infarct. No hemorrhage. No significant white matter 


disease. No edema.  


Cerebral ventricles: Normal. No ventriculomegaly. 


Bones/joints: Unremarkable. 


Paranasal sinuses: Normal as visualized. No acute sinusitis. 


Mastoid air cells: Normal as visualized. No mastoid effusion. 


Orbital cavity: Unremarkable. 


Soft tissues: Unremarkable. 





IMPRESSION: 


No acute findings. 





Electronically signed by: Alfredo Davila On 01/18/2021  21:23:45 PM





Exam: MR Angiogram Head Without Contrast, Arteries 


Exam date and time: 1/18/2021 8:03 PM 


Age: 65 years old 


Clinical indication: Pain; Headache; Patient HX: Confusion, dizziness, AMS; 


Additional info: CVA 





TECHNIQUE: 


Imaging protocol: MR angiogram head without contrast. Exam focused on the 


arteries. 


3D rendering (Not supervised by radiologist): MIP and/or 3D reconstructed 


images were created by the technologist. 





COMPARISON: 


CT Head without contrast 1/18/2021 1:19 PM 





FINDINGS: 





ANTERIOR CIRCULATION: 


Right internal carotid artery: Intracranial segment is patent with no 


significant stenosis. No aneurysm. 


Right middle cerebral artery: No occlusion or significant stenosis. No 


aneurysm. 


Right anterior cerebral artery: No occlusion or significant stenosis. No 


aneurysm. 





Left internal carotid artery: Intracranial segment is patent with no 


significant stenosis. No aneurysm. 


Left middle cerebral artery: No occlusion or significant stenosis. No aneurysm. 


Left anterior cerebral artery: Absent A1 segment on the left. A2 on the left 


segment fills via collateral flow from the right. 





POSTERIOR CIRCULATION: 


Right vertebral artery: No occlusion or significant stenosis. No aneurysm. 


Left vertebral artery: No occlusion or significant stenosis. No aneurysm. 


Basilar artery: No occlusion or significant stenosis. No aneurysm. 


Right posterior cerebral artery: Hypoplastic P1 segment on the right. 


Persistent fetal origin of the right posterior cerebral artery. 


Left posterior cerebral artery: No occlusion or significant stenosis. No 


aneurysm. 





IMPRESSION: 


No acute findings.





Electronically signed by: Alfredo Davila On 01/18/2021  21:27:27 PM








ASSESSMENT AND PLAN: 





65-year-old female with history of medical noncompliance, schizophrenia, 

paroxysmal atrial flutter, noncompliant with eliquis. osteoarthritis, chronic 

back pain, depression, anxiety, osteoarthritis, abdominal hernia and history of 

tubal ligation presented to the emergency room with complaints of ataxia for 2 

days with complaints of dizziness, poor balance and tinnitus in the left ear. 

Evaluation included CT of the head, MRI of the brain, all of which were 

negative. Electrolytes were negative and orthostasis was negative





Vertigo


-Most likely peripheral vertigo with negative findings on MRI, MRA of the brain 

for acute  cerebellar CVA


-PT, OT have been consulted for home safety evaluation


-As needed, meclizine





Paroxysmal atrial flutter


-Rate controlled


-[facility] pharmacy to confirm patient's home medications





Schizophrenia, depression, anxiety


-May resume home medications once confirmed by [facility] pharmacy





Medical noncompliance


-Home care referral





Osteoarthritis with chronic back pain


-Did not complain of any symptoms during this admission





Disposition await PT, OT recommendations.





VS, I&O, 24H, [facility]


Vital Signs/I&O





Vital Signs








  Date Time  Temp Pulse Resp B/P (MAP) Pulse Ox O2 Delivery O2 Flow Rate FiO2


 


1/19/21 09:30  66  136/77 (96)    





  66  128/66 (86)    





  66  124/67 (86)    


 


1/19/21 06:00 98.0  18  95   


 


1/18/21 21:50      Room Air  














I&O- Last 24 Hours up to 6 AM 


 


 1/19/21





 06:00


 


Intake Total 750 ml


 


Output Total 200 ml


 


Balance 550 ml











Laboratory Data


24H LABS


Laboratory Tests 2


1/18/21 13:15: 


Immature Granulocyte % (Auto) 0.5, Neutrophils (%) (Auto) 78.5H, Lymphocytes (%)

(Auto) 14.3L, Monocytes (%) (Auto) 5.9H, Eosinophils (%) (Auto) 0.5, Basophils 

(%) (Auto) 0.3, Neutrophils # (Auto) 7.3, Lymphocytes # (Auto) 1.3L, Monocytes #

(Auto) 0.6, Eosinophils # (Auto) 0.1, Basophils # (Auto) 0.0, Nucleated Red 

Blood Cells % (auto) 0.0, Prothrombin Time 13.2, Prothromb Time International 

Ratio 0.98, Activated Partial Thromboplast Time 26.3, Anion Gap 5L, Glomerular 

Filtration Rate > 60.0, Calcium Level 8.9, Total Bilirubin 0.6, Direct Bilirubin

0.1, Aspartate Amino Transf (AST/SGOT) 20, Alanine Aminotransferase (ALT/SGPT) 

21, Alkaline Phosphatase 65, Total Creatine Kinase 64, Creatine Kinase MB < 1.0,

Creatine Kinase MB Relative Index 1.56, Troponin I < 0.02, Total Protein 7.2, 

Albumin 3.4, Albumin/Globulin Ratio 0.9L, Thyroid Stimulating Hormone (TSH) 

0.512, Free Thyroxine 0.81


1/18/21 16:36: 


Coronavirus (COVID-19)(PCR) NEGATIVE, Influenza Type A (RT-PCR) NEGATIVE, 

Influenza Type B (RT-PCR) NEGATIVE, Respiratory Syncytial Virus (PCR) NEGATIVE


1/19/21 07:35: 


Nucleated Red Blood Cells % (auto) 0.0, Anion Gap 7L, Glomerular Filtration Rate

> 60.0, Calcium Level 8.5L, Estimated Mean Plasma Glucose 151H, Hemoglobin A1c 

6.9


CBC/BMP


Laboratory Tests


1/18/21 13:15








1/19/21 07:35

















NICHO BOWDEN MD            Jan 19, 2021 10:12

## 2021-02-22 ENCOUNTER — HOSPITAL ENCOUNTER (EMERGENCY)
Dept: HOSPITAL 53 - M ED | Age: 66
Discharge: HOME | End: 2021-02-22
Payer: MEDICARE

## 2021-02-22 VITALS — WEIGHT: 279.59 LBS | HEIGHT: 66 IN | BODY MASS INDEX: 44.93 KG/M2

## 2021-02-22 VITALS — DIASTOLIC BLOOD PRESSURE: 65 MMHG | SYSTOLIC BLOOD PRESSURE: 149 MMHG

## 2021-02-22 DIAGNOSIS — H81.10: Primary | ICD-10-CM

## 2021-02-22 DIAGNOSIS — Z79.899: ICD-10-CM

## 2021-02-22 DIAGNOSIS — Z79.01: ICD-10-CM

## 2021-02-22 DIAGNOSIS — Z88.1: ICD-10-CM

## 2021-02-22 DIAGNOSIS — I48.92: ICD-10-CM

## 2021-02-22 LAB
ALBUMIN SERPL BCG-MCNC: 3.2 GM/DL (ref 3.2–5.2)
ALT SERPL W P-5'-P-CCNC: 19 U/L (ref 12–78)
BASOPHILS # BLD AUTO: 0 10^3/UL (ref 0–0.2)
BASOPHILS NFR BLD AUTO: 0.3 % (ref 0–1)
BILIRUB SERPL-MCNC: 0.4 MG/DL (ref 0.2–1)
BUN SERPL-MCNC: 17 MG/DL (ref 7–18)
CALCIUM SERPL-MCNC: 8.2 MG/DL (ref 8.8–10.2)
CHLORIDE SERPL-SCNC: 103 MEQ/L (ref 98–107)
CO2 SERPL-SCNC: 29 MEQ/L (ref 21–32)
CREAT SERPL-MCNC: 0.74 MG/DL (ref 0.55–1.3)
EOSINOPHIL # BLD AUTO: 0.1 10^3/UL (ref 0–0.5)
EOSINOPHIL NFR BLD AUTO: 1.3 % (ref 0–3)
GFR SERPL CREATININE-BSD FRML MDRD: > 60 ML/MIN/{1.73_M2} (ref 45–?)
GLUCOSE SERPL-MCNC: 127 MG/DL (ref 70–100)
HCT VFR BLD AUTO: 42.1 % (ref 36–47)
HGB BLD-MCNC: 13.5 G/DL (ref 12–15.5)
LYMPHOCYTES # BLD AUTO: 2.7 10^3/UL (ref 1.5–5)
LYMPHOCYTES NFR BLD AUTO: 29.2 % (ref 24–44)
MAGNESIUM SERPL-MCNC: 2.1 MG/DL (ref 1.8–2.4)
MCH RBC QN AUTO: 29 PG (ref 27–33)
MCHC RBC AUTO-ENTMCNC: 32.1 G/DL (ref 32–36.5)
MCV RBC AUTO: 90.3 FL (ref 80–96)
MONOCYTES # BLD AUTO: 0.8 10^3/UL (ref 0–0.8)
MONOCYTES NFR BLD AUTO: 8.5 % (ref 2–8)
NEUTROPHILS # BLD AUTO: 5.6 10^3/UL (ref 1.5–8.5)
NEUTROPHILS NFR BLD AUTO: 60.3 % (ref 36–66)
PLATELET # BLD AUTO: 198 10^3/UL (ref 150–450)
POTASSIUM SERPL-SCNC: 4 MEQ/L (ref 3.5–5.1)
PROT SERPL-MCNC: 6.5 GM/DL (ref 6.4–8.2)
RBC # BLD AUTO: 4.66 10^6/UL (ref 4–5.4)
SODIUM SERPL-SCNC: 141 MEQ/L (ref 136–145)
T4 FREE SERPL-MCNC: 0.91 NG/DL (ref 0.76–1.46)
TSH SERPL DL<=0.005 MIU/L-ACNC: 0.7 UIU/ML (ref 0.36–3.74)
WBC # BLD AUTO: 9.3 10^3/UL (ref 4–10)

## 2021-02-22 NOTE — ECGEPIP
[facility] - ED

                                       

                                       Test Date:    2021

Pat Name:     JOSE R MAK             Department:   

Patient ID:   [id number]                 Room:         -

Gender:       Female                   Technician:   corona

:          1955               Requested By: VALERIANO MESA

Order Number: [id number]     Reading MD:   Oleksandr Liu

                                 Measurements

Intervals                              Axis          

Rate:         53                       P:            44

RI:           196                      QRS:          27

QRSD:         84                       T:            20

QT:           470                                    

QTc:          441                                    

                           Interpretive Statements

Sinus bradycardia with occasional premature ventricular complexes

Low voltage QRS throughout

Nonspecific ST-T wave abnormalities

QTc normalized from tracing done 13:10 on same date

Electronically Signed on 2021 16:15:47 EST by Oleksandr Liu

## 2021-02-22 NOTE — ECGEPIP
[facility] - ED

                                       

                                       Test Date:    2021

Pat Name:     JOSE R MAK             Department:   

Patient ID:   [id number]                 Room:         -

Gender:       Female                   Technician:   

:          [id number]               Requested By: VALERIANO MESA

Order Number: [id number]     Reading MD:   Oleksandr Liu

                                 Measurements

Intervals                              Axis          

Rate:         90                       P:            72

IA:           216                      QRS:          95

QRSD:         78                       T:            80

QT:           406                                    

QTc:          496                                    

                           Interpretive Statements

Sinus rhythm with marked sinus arrhythmia with 1st degree AV block with

premature 

atrial complexes

Rightward axis

Low voltage throughout

Nonspecific ST-T wave abnormalities

Prolonged QTc interval

Rhythm change from 21

Electronically Signed on 2021 16:09:18 EST by Oleksandr Liu

## 2021-02-22 NOTE — CCD
Summarization Of Episode

                             Created on: 2021



JOSE R MAK

External Reference #: [id number]

: 1955

Sex: Female



Demographics





                          Address                   [address] [place]  [address]

 

                          Home Phone                [phone]

 

                          Preferred Language        English

 

                          Marital Status            Single

 

                          Congregational Affiliation     EP

 

                          Race                      White

 

                          Ethnic Group              Not  or 





Author





                          Author                    [facility]

 

                          Organization              [facility]

 

                          Address                   Unknown

 

                          Phone                     Unavailable







Support





                Name            Relationship    Address         Phone

 

                RODRIGUEZDENISE VELIZ  Next Of Kin     Unknown         Unavailable

 

                    Rell ALVAREZP,  Chela Next Of Kin         [address] [place] [address]                    [phone]

 

                    Oneida ORO,  Margie Next Of Kin         [address] [place] [address]  [id number]                [phone]

 

                    Sae ALVAREZP,  Coty Next Of Kin         [address] [place] [address]                    [phone]

 

                    Elvin RPA-C,  Raiza Next Of Kin         [address] [place] [address]                    [phone]

 

                    Nallely ANP-BC,  Deyanira Next Of Kin         [address] [place], [place] [address]                    [phone]

 

                    ""                  Next Of Kin         [address] [place]  [address]                    [phone]

 

                DISABLED        Next Of Kin     Unknown         Unavailable

 

                UE              Next Of Kin     Unknown         Unavailable

 

                    [facility] NEWSPAPER Next Of Kin         [address] [place]  [address]                    [phone]

 

                    LOLITADENISE NEWTON    Next Of Kin         [place] [id number]                    [phone]







Care Team Providers





                    Care Team Member Name Role                Phone

 

                    Dianne Car MD Unavailable         Unavailable

 

                    Dianne Car MD Unavailable         Unavailable

 

                    Dianne Car MD Unavailable         Unavailable

 

                    Dianne Car MD Unavailable         Unavailable

 

                    Dianne Car MD Unavailable         Unavailable

 

                    Dianne Car MD Unavailable         Unavailable

 

                    Dianne Car MD Unavailable         Unavailable

 

                    Dianne Car MD Unavailable         Unavailable

 

                    Dianne Car MD Unavailable         Unavailable

 

                    Dianne Car MD Unavailable         Unavailable

 

                    Dianne Car MD Unavailable         Unavailable

 

                    Dianne Car MD Unavailable         Unavailable

 

                    Dianne Car MD Unavailable         Unavailable

 

                    Dianne Car MD Unavailable         Unavailable

 

                    Dianne Car MD Unavailable         Unavailable

 

                    Dianne Car MD Unavailable         Unavailable

 

                    Dianne Car MD Unavailable         Unavailable

 

                    Dianne Car MD Unavailable         Unavailable

 

                    Dianne Car MD Unavailable         Unavailable

 

                    Dianne aCr MD Unavailable         Unavailable

 

                    Dianne Car MD Unavailable         Unavailable

 

                    Dianne Car MD Unavailable         Unavailable

 

                    Dianne Car MD Unavailable         Unavailable

 

                    Dianne Car MD Unavailable         Unavailable

 

                    Dianne Car MD Unavailable         Unavailable

 

                    Dianne Car MD Unavailable         Unavailable

 

                    Dianne Car MD Unavailable         Unavailable

 

                    Dianne Car MD Unavailable         Unavailable

 

                    Dianne Car MD Unavailable         Unavailable

 

                    Dianne Car MD Unavailable         Unavailable

 

                    Dianne Car MD Unavailable         Unavailable

 

                    Dianne Car MD Unavailable         Unavailable

 

                    Dianne Car MD Unavailable         Unavailable

 

                    Dianne Car MD Unavailable         Unavailable

 

                    Dianne Car MD Unavailable         Unavailable

 

                    Dianne Car MD Unavailable         Unavailable

 

                    Dianne Car MD Unavailable         Unavailable

 

                    Dianne Car MD Unavailable         Unavailable

 

                    Dianne Car MD Unavailable         Unavailable

 

                    Dianne Car MD Unavailable         Unavailable

 

                    Dianne Car MD Unavailable         Unavailable

 

                    Dianne Car MD Unavailable         Unavailable

 

                    Dianne Car MD Unavailable         Unavailable

 

                    Dianne Car MD Unavailable         Unavailable

 

                    Dianne Car MD Unavailable         Unavailable

 

                    Dianne Car MD Unavailable         Unavailable

 

                    Dianne Car MD Unavailable         Unavailable

 

                    Dianne Car MD Unavailable         Unavailable

 

                    Dianne Car MD Unavailable         Unavailable

 

                    Dianne Car MD Unavailable         Unavailable

 

                    Dianne Car MD Unavailable         Unavailable

 

                    Dianne Car MD Unavailable         Unavailable

 

                    Dianne Car MD Unavailable         Unavailable

 

                    Dianne Car MD Unavailable         Unavailable

 

                    Dianne Car MD Unavailable         Unavailable

 

                    Dianne Car MD Unavailable         Unavailable

 

                    Dianne Car MD Unavailable         Unavailable

 

                    Dianne Car MD Unavailable         Unavailable

 

                    Chela Zacarias FNP FNP Unavailable         Unavailable

 

                    SHANNON Zacarias FNP Unavailable         Unavailable

 

                    SHANNON Zacarias FNP Unavailable         Unavailable

 

                    SHANNON Zacarias FNP Unavailable         Unavailable

 

                    SHANNON Zacariasandra FNP Unavailable         Unavailable

 

                    Rell, A Chela FNP Unavailable         Unavailable

 

                    Rell, A Chela FNP Unavailable         Unavailable

 

                    Rell, A Chela FNP Unavailable         Unavailable

 

                    Rell, A Chela FNP Unavailable         Unavailable

 

                    Rell, A Chela FNP Unavailable         Unavailable

 

                    Rell, A Chela FNP Unavailable         Unavailable

 

                    Rell, A Chela FNP Unavailable         Unavailable

 

                    Rell, A Chela FNP Unavailable         Unavailable

 

                    Rell, A Chela FNP Unavailable         Unavailable

 

                    Rell, A Chela FNP Unavailable         Unavailable

 

                    Rell, A Chela FNP Unavailable         Unavailable

 

                    Rell, A Chela FNP Unavailable         Unavailable

 

                    Rell, A Chela FNP Unavailable         Unavailable

 

                    Rell, A Chela FNP Unavailable         Unavailable

 

                    Rell, A Chela FNP Unavailable         Unavailable

 

                    Rell, A Chela FNP Unavailable         Unavailable

 

                    Rell, A Chela FNP Unavailable         Unavailable

 

                    Rell, A Chela FNP Unavailable         Unavailable

 

                    Rell, A Chela FNP Unavailable         Unavailable

 

                    Rell, A Chela FNP Unavailable         Unavailable

 

                    Rell, A Chela FNP Unavailable         Unavailable

 

                    Rell, A Chela FNP Unavailable         Unavailable

 

                    Rell, A Chela FNP Unavailable         Unavailable

 

                    Rell, A Chela FNP Unavailable         Unavailable

 

                    EGORHO, F FRANCK FPMHNP Unavailable         Unavailable

 

                    EGORHO, F FRANCK FPMHNP Unavailable         Unavailable

 

                    EGORHO, F FRANCK FPMHNP Unavailable         Unavailable

 

                    EGORHO, F FRANCK FPMHNP Unavailable         Unavailable

 

                    EGORHO, F FRANCK FPMHNP Unavailable         Unavailable

 

                    EGORHO, F FRANCK FPMHNP Unavailable         Unavailable



                                  



Re-disclosure Warning

          The records that you are about to access may contain information from 
federally-assisted alcohol or drug abuse programs. If such information is 
present, then the following federally mandated warning applies: This information
has been disclosed to you from records protected by federal confidentiality 
rules (42 CFR part 2). The federal rules prohibit you from making any further 
disclosure of this information unless further disclosure is expressly permitted 
by the written consent of the person to whom it pertains or as otherwise 
permitted by 42 CFR part 2. A general authorization for the release of medical 
or other information is NOT sufficient for this purpose. The Federal rules 
restrict any use of the information to criminally investigate or prosecute any 
alcohol or drug abuse patient.The records that you are about to access may 
contain highly sensitive health information, the redisclosure of which is 
protected by Article 27-F of the [place] Public Health law. If you 
continue you may have access to information: Regarding HIV / AIDS; Provided by 
facilities licensed or operated by the [facility] Office of Mental Health; 
or Provided by the [facility] Office for People With Developmental 
Disabilities. If such information is present, then the following [place] 
mandated warning applies: This information has been disclosed to you from 
confidential records which are protected by state law. State law prohibits you 
from making any further disclosure of this information without the specific 
written consent of the person to whom it pertains, or as otherwise permitted by 
law. Any unauthorized further disclosure in violation of state law may result in
a fine or custodial sentence or both. A general authorization for the release of 
medical or other information is NOT sufficient authorization for further disc
losure.                                                                         
    



Allergies and Adverse Reactions

          



           Type       Description Substance  Reaction   Status     Data Source(s

)

 

                    Propensity to adverse reactions to substance nkda           

     24 HR Bupropion Hydrochloride 

150 MG Extended Release Oral Tablet                     Active              [facility]

[facility] ([facility])



                                                                                
       



Encounters

          



           Encounter  Providers  Location   Date       Indications Data Source(s

)

 

                Outpatient      Attender: FRANCK LOPEZELIZABETH [facility] 2020 

10:00:00 AM EDT - 2020 10:00:00 AM EDT                           [facility]

 ([facility])

 

                      Attender: FRANCK LOPEZELIZABETH            2020 12:00:

00 AM EDT            [facility] ([facility])

 

                Outpatient      Attender: Dianne Car MD [email or web address]  12:00:00 AM 

EDT - 2020 02:23:26 PM EDT                           [facility]

 

           [facility] 02:57:00 P

M EDT            [facility]

 

           [facility]: [facility]         2020 02:54:00 P

M EDT            [facility]

 

           [facility]: [facility]         2020 02:53:00 P

M EDT            [facility]

 

           Outpatient Attender: ULISSES GTZ          2020 01:20:01 P

M EDT            [facility]

 

           [facility]: [facility]         2020 01:18:00 P

M EDT            [facility]

 

           Outpatient Attender: ULISSES Zacarias [facility]         2020 01:17:01 P

M EDT            [facility]

 

           Outpatient Attender: Chela Zacarias [facility]         2020 11:1

6:03 AM EDT            [facility]

 

           Outpatient Attender: ULISSES Zacarias [facility]         2020 10:51:00 A

M EDT            [facility]

 

           Outpatient Attender: ULISSES Zacarias [facility]         2020 10:50:00 A

M EDT            [facility]

 

           Outpatient Attender: ULISSES Zacarias [facility]         07/15/2020 05:06:01 P

M EDT            [facility]

 

           Outpatient Attender: Chela Zacarias [facility]         07/15/2020 03:4

2:00 PM EDT            [facility]

 

           Outpatient Attender: ULISSES Zacarias [facility]         07/15/2020 11:50:00 A

M EDT            [facility]

 

           Outpatient Attender: ULISSES Zacarias [facility]         2020 12:07:01 P

M EDT            [facility]

 

           Outpatient Attender: ULISSES Zacarias [facility]         2020 09:36:01 A

M EDT            [facility]

 

           Outpatient Attender: Chela Zacarias UL[facility] FP         06/10/2020 03:4

4:00 PM EDT            [facility]

 

           Outpatient Attender: ULISSES Zacarias [facility] FP         2020 02:47:00 P

M EDT            [facility]

 

           Outpatient Attender: ULISSES Zacarias ULISSES FP         2020 08:01:14 P

M EDT            [facility]



                                                                                
                                                                                
                                                                                
                         



Functional Status

                                                                                
                 



Medications

          



          Medication Brand Name Start Date Product Form Dose      Route     Admi

nistrative 

Instructions Pharmacy Instructions Status     Indications Reaction   Description

 Data 

Source(s)

 

                    Meclizine Hydrochloride 25 MG Oral Tablet MECLIZINE HCL     

  2021 12:00:00 AM 

EST             tablet          30                              TAKE ONE TABLET 

BY MOUTH THREE TIMES A DAY AS NEEDED FOR 

DIZZINESS                 TAKE ONE TABLET BY MOUTH THREE TIMES A DAY AS NEEDED F

OR DIZZINESS 

SOLD: 2021                                                 [facility]

 

          5 mg                2021 12:00:00 AM EST tablet    60           

       TAKE ONE TABLET BY MOUTH TWICE A 

DAY        TAKE ONE TABLET BY MOUTH TWICE A DAY SOLD: 2021                

                  [facility]

 

                    Meclizine Hydrochloride 25 MG Oral Tablet MECLIZINE HCL     

  2021 12:00:00 AM 

EST             tablet          30                              TAKE ONE TABLET 

BY MOUTH THREE TIMES A DAY AS NEEDED FOR 

DIZZINESS                 TAKE ONE TABLET BY MOUTH THREE TIMES A DAY AS NEEDED F

OR DIZZINESS 

SOLD: 2021                                                 [facility]

 

          50 mg               2021 12:00:00 AM EST tablet    60           

       TAKE ONE TABLET BY MOUTH TWICE A 

DAY AS NEEDED FOR HR > 120              TAKE ONE TABLET BY MOUTH TWICE A DAY AS 

NEEDED FOR HR

> 120        SOLD: 2021                                        [facility]

s

 

          10 mg               2020 12:00:00 AM EDT tablet    30           

       TAKE ONE TABLET BY MOUTH EVERY 

DAY        TAKE ONE TABLET BY MOUTH EVERY DAY SOLD: 2020                  

                [facility]

 

          400 mg              2020 12:00:00 AM EDT suspension,extended rel

 syring 1                   INJECT 

400MG INTRAMUSCULARLY ONCE EVERY FOUR WEEKS INJECT 400MG INTRAMUSCULARLY ONCE 

EVERY FOUR WEEKS SOLD: 2020                                        [facility]

 

           aripiprazole 10 MG Oral Tablet aripiprazole 2020 12:00:00 AM ED

T            10 mg      

by mouth             completed             [id number] aripiprazole by mouth [id number]

2020 

09/10/2020 once a day 30    10 mg tablet             [id number] Chi

ka Egorho 

[id number]          Nurse Practitioner                      [facility])

 

          10 mg               2020 12:00:00 AM EDT tablet    7            

       TAKE ONE TABLET BY MOUTH EVERY DAY

AT BEDTIME FOR MOOD       TAKE ONE TABLET BY MOUTH EVERY DAY AT BEDTIME FOR MOOD

 SOLD:

2020                                                      [facility]

 

          400 mg              2020 12:00:00 AM EDT suspension,extended rel

 syring 1                   INJECT 

400MG INTRAMUSCULARLY ONCE EVERY 4 WEEKS FOR MOOD INJECT 400MG INTRAMUSCULARLY 

ONCE EVERY 4 WEEKS FOR MOOD SOLD: 2020                                    

    [facility]

 

          10 mg               2020 12:00:00 AM EDT tablet    7            

       TAKE ONE TABLET BY MOUTH EVERY DAY

AT BEDTIME FOR MOOD       TAKE ONE TABLET BY MOUTH EVERY DAY AT BEDTIME FOR MOOD

 SOLD:

2020                                                      [facility]

 

          10 mg               2020 12:00:00 AM EDT tablet    7            

       TAKE ONE TABLET BY MOUTH EVERY DAY

AT BEDTIME FOR MOOD       TAKE ONE TABLET BY MOUTH EVERY DAY AT BEDTIME FOR MOOD

 SOLD:

2020                                                      [facility]



                                                                                
                                                                                
      



Insurance Providers

          



             Payer name   Policy type / Coverage type Policy ID    Covered party

 ID Covered 

party's relationship to flaherty Policy Flaherty             Plan Information

 

          EMEDNY              [id number]            SP                  [id number]

 

          [facility] MCRHMO           [id number]           SP             

     [id number]

 

          [employer] COMMUNITY PLAN MCDHMO           [id number]           SP           

       [id number]

 

          MEDICARE            [id number]           SP                  [id number]

[id number]

 

          [employer] COMMUNITY PLAN MCDHMO           [id number]           SP           

       [id number]

 

          [facility] MCR HMO           [id number]           SP          

        [id number]

 

          MEDICARE COMPLETE           [id number]           SP                  [id number]

[id number]

 

          MEDICARE            [id number]           SP                  [id number]

[id number]

 

          [facility] MEDICARE           [id number]           [place]il[place]                 [id number]

[id number]

 

          [facility] [employer] [facility] P         [id number]           S      

             [id number]

 

          Medicaid  S         [id number]            S                   [id number]

 

          [facility] [employer] [facility] P         [id number]           S      

             [id number]

 

          EMEDNY              [id number]            SP                  [id number]

 

          [facility] MCRHMO           [id number]           SP             

     [id number]

 

          [facility] [employer] P         [id number]           S      

             [id number]

 

          MEDICAID            [id number]            SP                  [id number]

 

          MEDICAID  M         [id number]            S                   [id number]

 

          MEDICARE  C         [id number]           S                   [id number]

A

 

          [facility] MCRHMO           [id number]           SP             

     [id number]

 

          MEDICAID            [id number]            SP                  [id number]

 

          [facility] [employer] P         [id number]           S      

             [id number]

 

          Medicaid  S         [id number]            S                   [id number]

 

          SELF PAY ONLY           [id number]           SP                  [id number]

[id number]

 

          [facility] [employer] P         [id number]           S      

             [id number]

 

          Medicare  S         [id number]           S                   [id number]

A

 

          MEDICARE            [id number]           SP                  [id number]

A

 

          Managed Care - Community Plan [facility] P         [id number]   

        S                   [id number]

 

          Medicare  P         [id number]           S                   [id number]

A

 

          Medicaid  S         [id number]            S                   [id number]

 

          Managed Care - Community Plan [employer] [facility] P         [id number]   

        S                   [id number]

 

          [employer]HC COMMUNITY PLAN MCDHMO           [id number]           SP           

       [id number]

 

          Managed Care - Community Plan [employer] P         [id number]   

        S                   [id number]

 

          Medicaid  S         [id number]            S                   [id number]

 

          Managed Care BCBS O         [id number]           S                  

 [id number]

 

          BLUE CROSS [employer] PLAN           [id number]           SP            

      [id number]

 

          [facility] BLUE            [id number]           SP                  [id number]

[id number]

 

                                                                       



                                                                                
                                                                                
                                                                                
                                                                                
                                                                                
                                           



Problems, Conditions, and Diagnoses

          



           Code       Display Name Description Problem Type Effective Dates Data

 Source(s)

 

                    F32.0               Major depressive disorder, single episod

e, mild Major Depressive Disorder,

Single episode, Mild Condition           2020 12:00:00 AM EDT [facility] ([facility] 

[facility])

 

                          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

 due to a substance or known physiological 

condition                 Other Specified Schizophrenia Spectrum and Other Psych

otic Disorder 

Condition                 2020 12:00:00 AM EDT [facility]

 

             I48.92       Unspecified atrial flutter Unspecified atrial flutter 

Diagnosis    

2020 12:54:58 PM EDT              [facility] [facility]



                                                                                
                                     



Surgeries/Procedures

          



             Procedure    Description  Date         Indications  Data Source(s)

 

                    OFFICE OUTPATIENT VISIT 15 MINUTES                      12:00:00 AM EDT - 2020 

12:00:00 AM EDT                                     [facility] ([facility])

 

             OFFICE OUTPATIENT VISIT 15 MINUTES              2020 12:00:00

 AM EDT              [facility] ([facility])



                                                                                
                 



Results

          



                    ID                  Date                Data Source

 

                    [id number]             2021 04:36:00 PM EST [facility]









          Name      Value     Range     Interpretation Code Description Data Carlene

rce(s) Supporting 

Document(s)

 

                                        SARS coronavirus 2 RNA [Presence] in Res

piratory specimen by MARYJO with probe 

detection  NEGATIVE                                    [facility]      

 

                                        This lab was ordered by [facility] a

nd reported by [facility].

 









                    ID                  Date                Data Source

 

                    [id number]    2020 10:48:24 AM EDT [facility]

 

                                        Measurements & CalculationsHeight:      

       67 inches (5 ft. 7 in.) 170.18 cm

    Weight:             273 pounds      124.09 kg    Body Mass Index (BMI):  
42.91BMI Interpretation:         Morbidly ObeseBody Surface Area (BSA):    
2.31Weight Management Education Done (Nutrition/Physical Activity)Vital 
SignsTemperature:        98.4FPulse Rate:         76 beats/minuteRespiratory 
Rate:   18 respirations/minuteBlood Pressure:     113/74     O2 Saturation:  94%
         Vital Signs performed by: Allyn Liu MA,  2020 10:59 
AMInitial Intake Information From: patientRoom #: 14Infectious Disease / Travel 
ScreeningRecent travel for you or any close contacts? NoHave you had any close 
contact with anyone diagnosed with or under investigation for COVID-19 
(coronavirus)? NoFever? NoRespiratory symptoms: cough, cold, congestion, 
shortness of breath, difficulty breathing? NoLoss of smell? NoLoss of taste? 
NoSmoking, Tobacco, Vaping or Smoke Exposure StatusSmoke Status: former 
smokerTobacco Use: NoDo you vape? NoMenstrual HistoryAny possibility of 
pregnancy? NoHealthcare HistorySince your last office visit...Have you been 
ad[facility] to the hospital? Yes [facility] you been to an emergency room (ER) 
or urgent care clinic? NoHave you seen another healthcare  provider? NoHave you 
seen a dentist? NoIntake performed by:  Allyn Liu MA,  2020 10:51 
AMRate Your HealthIn general, would you say your health is? Very GoodPain 
AssessmentAre you currently having any pain which...    You would like your 
provider to address? No    Affects your activity level? NoDepression Screening -
 PHQ-2Over the last two weeks, have you...    Had little interest or pleasure in
 doing things? Several days    Been feeling down, depressed, or hopeless? 
Several days PHQ-2 Score: 2Anxiety Screening - NINFA-2Over the last two weeks, 
have you been...    Feeling nervous, anxious, or on edge? Several days NINFA-2 
Score: 3Generalized Anxiety Disorder 7-Item Screening (NINFA-7)Answer Guide:0 = 
Not at all1 = Several days2 = Over half the days3 = Nearly every dayOver the 
last 2 weeks, how often have you been bothered by the following problems?Feeling
 nervous, anxious, or on edge: 1Not being able to stop or control worryinWorrying too much about different things: 2Trouble relaxinBeing so restless
 that it's hard to sit still: 0Becoming easily annoyed or irritable: 2Feeling 
afraid as if something awful might happen: 1Answer Guide:0 = Not difficult at 
all1 = Somewhat difficult2 = Very difficult3 = Extremely difficultHow difficult 
have these made it for you to do your work, take care of things at home, or get 
along with other people? 1GAD-7 Screening Results NINFA-2 Score: 3GAD-7 Score: 
9Functional Impairment: Somewhat difficultRecommendation: Mild anxietyPHQ-9 1.  
Over the last 2 weeks, patient reports the following frequency of symptoms:     
a. Little interest or pleasure in doing things                               -
Several days    b. Feeling down, depressed, or hopeless                         
         -Several days    c. Trouble falling asleep, staying asleep, or sleeping
 too much   -Not at all    d. Feeling tired or having little energy             
                          -Several days    e. Poor appetite or overeating       
                                           -Nearly every day    f.  Feeling bad 
about yourself, feeling that you are a failure, or feeling that you have let 
yourself or your        family down                                             
                          -Nearly every day    g. Trouble concentrating on 
things such as reading the newspaper        or watching television              
                                            -Several days    h. Moving or 
speaking so slowly that other people could have noticed. Or being so fidgety or 
restless that        you have been moving around a lot more than usual          
     -Not at all     i. Thinking that you would be better off dead or that you 
want         to hurt yourself in some way                                       
        -Not at all2. If you checked off any problems, how difficult have these 
problems made it for you to do your work, take       care of things at home, or 
get along with other people?           -Somewhat DifficultToday's PHQ-9 Results 
   Score: 10    Severity: Moderate    Diagnosis Recommendation: No 
recommendation    Functional Impairment: Somewhat DifficultScreening, Brief 
Intervention, & Referral to Treatment (SBIRT)Pre-Screening Questions How many 
times have you have 4 or more drinks in a day? 0How many times have you used an 
illegal drug or used a prescription medication for a non-medical reason? 
0Performed by: Allyn Liu MA,  2020 10:51 AMPatient History 
Social/Personal History: Chief ComplaintHDHistory of Present Illness 
(HPI)Recently hospitalized for worsenng of her schizophrenia. Started on Abilify
 and doing better now. [facility] Psych[facility]rist in one week. No thoughts of self 
harm.Also had atrial flutter as a an inpatient and this was assessed. She has an
 appointment with her Cardiologist in 2 weeks. Has had issues in the past with 
atrial fibrillation. Currently asymptomatic.HPI performed by: Guido Castillo MD,  
2020 11:03 AMTransitions of Care InboundProblem ReviewProblem List was 
reviewed and/or updated during this visit.Medication Reconciliation & 
ReviewMedication List was reviewed and/or updated during this visit, including 
review of any over-the-counter medications, herbal therapies, and/or 
supplements.Allergy ReviewAllergy List was reviewed and/or updated during this 
visit.Adult Preventive CareLabs/Meds/Other Counseling-Nutrition and Physical 
Activity:BMI Interpretation: Morbidly Obese (2020)  Counseling: Done 
(2020)  Physical Activity: Done (2020)Cancer Screening  Mammogram 
Reviewed: Previous Comments: 2-3years ago unsure of where (2018)Today's 
Comments: [facility]olorectal Screening: Patient refused colorectal screeningReview of
 Systems General: Denies chills, dizziness, fatigue, fever.  Cardiovascular: 
Denies chest pain.  Respiratory: Denies shortness of breath.  Gastrointestinal: 
Denies constipation.  Genitourinary: Denies burning with urination.  Physical 
ExamGeneral Appearance: well nourished, well hydrated, no acute 
distressRespiratory, Auscultation: clear to auscultation bilaterally; no rales, 
rhonchi, or wheezesRespiratory, Effort: no intercostal retractions or use of 
accessory musclesCardiovascular, Auscultation: S1, S2 audible; no murmur, rub, 
or gallop; RRRGait & Station: normalSkin, Inspection: no rashes, lesions, or 
ulcerationsOrientation: oriented to time, place, and personMood & Affect: no 
depression, anxiety, or agitationJudgment & Insight: intactCare Management Plan 
Transitions of CareInboundRate Your HealthIn general, would you say your health 
is? Very GoodAssessment & Plan Problems:Assessed:Schizophrenia (ICD-295.90) 
(JKN73-R36.9)   Assessment:    Instructions: With recent hospital admit.Doing 
much better now.Continue current plan and recheck as scheduled with Psychiatrist
 next week.Unspecified atrial fibrillation (YTA99-B70.91)   Assessment:    
Instructions: With recent flare of this while inpatient.Currently symptoms 
free.Keep appointment in 2 weeks with Cardiologist.Patient Instructions/Care 
Plan: Schizophrenia: With recent hospital admit.Doing much better now.Continue 
current plan and recheck as scheduled with Psychiatrist next week.Unspecified 
atrial fibrillation: With recent flare of this while inpatient.Currently 
symptoms free.Keep appointment in 2 weeks with Cardiologist.----------Plan 
developed in collaboration with patient and/or familyMedications:ABILIFY MA
INTENA 400 MG INTRAMUSCULAR PREFILLED SYRINGEABILIFY 10 MG ORAL TABLETADULT 
ASPIRIN REGIMEN 81 MG ORAL TABLET DELAYED RELEASEALCOHOL WIPES PADBLOOD GLUCOSE 
TEST IN VITRO STRIPLANCETSMedication Changes:Added: ABILIFY 10 MG ORAL 
TABLETABILIFY MAINTENA 400 MG INTRAMUSCULAR PREFILLED SYRINGERemoved:CARVEDILOL 
12.5 MG ORAL TABLET-by mouth, twice a day for htn, DOCUSATE SODIUM 100 MG ORAL 
CAPSULE-by mouth, twice a day for constipation, TRAZODONE HCL 50 MG ORAL TABLET-
by mouth, at bedtime as needed for insomnia, ABILIFY 10 MG ORAL TABLET-by mouth 
twice daily for mood, ABILIFY MAINTENA 400 MG INTRAMUSCULAR SUSPENSION 
RECONSTITUTED ER-IM, every 4 weeks for psychosis for 28 days, ARISTADA 882 
MG/3.2ML INTRAMUSCULAR PREFILLED SYRINGE-IM, every 8 weeksAllergies:AMOXICILLIN 
(Moderate)Orders:Adult - Ofc Vst, EST, Level III [CPT-46611] Electronically 
signed by Guido Castillo MD on 2020 at 11:15 
AM________________________________________________________________________ 









          Name      Value     Range     Interpretation Code Description Data Carlene

rce(s) Supporting 

Document(s)

 

                                                                       







                                        Procedure

 

                                          



                                                                                
                           



Social History

          



           Code       Duration   Value      Status     Description Data Source(s

)

 

             Smoking      2020 12:00:00 AM EDT Unknown if ever smoked comp

leted    Unknown if 

ever smoked                             [facility] ([facility])



                                                                                
                 



Vital Signs

          



                    ID                  Date                Data Source

 

                    UNK                                      









           Name       Value      Range      Interpretation Code Description Data

 Source(s)

 

             Diastolic blood pressure 0 mm[Hg]                  Normal (applies 

to non-numeric results) 0 

mm[Hg]                                  [facility] ([facility])

 

             Systolic blood pressure 0 mm[Hg]                  Normal (applies t

o non-numeric results) 0 

mm[Hg]                                  [facility] ([facility])

 

                Body mass index (BMI) [Ratio] 0.00 kg/m2                      No

rmal (applies to non-numeric 

results)                  0.00 kg/m2                [facility] ([facility])

 

             Body weight Measured 0.00 lbs                  Normal (applies to n

on-numeric results) 0.00 lbs

                                        [facility] ([facility])

 

           Body height 0.00 in               Normal (applies to non-numeric resu

lts) 0.00 in    [facility] 

([facility])

## 2021-02-22 NOTE — CCD
Summarization Of Episode

                             Created on: 2021



JOSE R MAK

External Reference #: [id number]

: 1955

Sex: Female



Demographics





                          Address                   [address]  [address]

 

                          Home Phone                [phone]

 

                          Preferred Language        English

 

                          Marital Status            Single

 

                          Uatsdin Affiliation     EP

 

                          Race                      White

 

                          Ethnic Group              Not  or 





Author





                          Author                    [facility]

 

                          Organization              [facility]

 

                          Address                   Unknown

 

                          Phone                     Unavailable







Support





                Name            Relationship    Address         Phone

 

                JENNIFERDENISE  Next Of Kin     Unknown         Unavailable

 

                    Rell FNP,  Chela Next Of Kin         [address] [place] [address]  [address]                    [phone]

 

                    Oneida ORO,  Margie Next Of Kin         [address] [place] [address]  [id number]                [phone]

 

                    Sae ALVAREZP,  Coty Next Of Kin         [address] [place] [address]                    [phone]

 

                    Elvin RPA-C,  Raiza Next Of Kin         [address] [place]  [address]                    [phone]

 

                    Nallely ANP-BC,  Deyanira Next Of Kin         [address] [place] [address]  [address]                    [phone]

 

                    ""                  Next Of Kin         [address] [place]  [address]                    [phone]

 

                DISABLED        Next Of Kin     Unknown         Unavailable

 

                UE              Next Of Kin     Unknown         Unavailable

 

                    [facility] NEWSPAPER Next Of Kin         [address] [place]  [address]                    [phone]

 

                    LOLITADENISE NEWTON    [place]  [address]                    [phone]







Care Team Providers





                    Care Team Member Name Role                Phone

 

                    Dianne Car MD Unavailable         Unavailable

 

                    Dianne Car MD Unavailable         Unavailable

 

                    Dianne Car MD Unavailable         Unavailable

 

                    Dianne Car MD Unavailable         Unavailable

 

                    Dianne Car MD Unavailable         Unavailable

 

                    Dianne Car MD Unavailable         Unavailable

 

                    Dianne Car MD Unavailable         Unavailable

 

                    Dianne Car MD Unavailable         Unavailable

 

                    Dianne Car MD Unavailable         Unavailable

 

                    Dianne Car MD Unavailable         Unavailable

 

                    Dianne Car MD Unavailable         Unavailable

 

                    Dianne Car MD Unavailable         Unavailable

 

                    Dianne Car MD Unavailable         Unavailable

 

                    Dianne Car MD Unavailable         Unavailable

 

                    Dianne Car MD Unavailable         Unavailable

 

                    Dianne Car MD Unavailable         Unavailable

 

                    Dianne Car MD Unavailable         Unavailable

 

                    Dianne Car MD Unavailable         Unavailable

 

                    Dianne Car MD Unavailable         Unavailable

 

                    Dianne aCr MD Unavailable         Unavailable

 

                    Dianne Car MD Unavailable         Unavailable

 

                    Dianne Car MD Unavailable         Unavailable

 

                    Dianne Car MD Unavailable         Unavailable

 

                    Dianne Car MD Unavailable         Unavailable

 

                    Dianne Car MD Unavailable         Unavailable

 

                    Dianne Car MD Unavailable         Unavailable

 

                    Dianne Car MD Unavailable         Unavailable

 

                    Dianne Car MD Unavailable         Unavailable

 

                    Dianne Car MD Unavailable         Unavailable

 

                    Dianne Car MD Unavailable         Unavailable

 

                    Dianne Car MD Unavailable         Unavailable

 

                    Dianne Car MD Unavailable         Unavailable

 

                    Dianne Car MD Unavailable         Unavailable

 

                    Dianne Car MD Unavailable         Unavailable

 

                    Dianne Car MD Unavailable         Unavailable

 

                    Dianne Car MD Unavailable         Unavailable

 

                    Dianne Car MD Unavailable         Unavailable

 

                    Dianne Car MD Unavailable         Unavailable

 

                    Dianne Car MD Unavailable         Unavailable

 

                    Dianne Car MD Unavailable         Unavailable

 

                    Dianne Car MD Unavailable         Unavailable

 

                    Dianne Car MD Unavailable         Unavailable

 

                    Dianne Car MD Unavailable         Unavailable

 

                    Dianne Car MD Unavailable         Unavailable

 

                    Dianne Car MD Unavailable         Unavailable

 

                    Dianne Car MD Unavailable         Unavailable

 

                    Dianne Car MD Unavailable         Unavailable

 

                    Dianne Car MD Unavailable         Unavailable

 

                    Dianne Car MD Unavailable         Unavailable

 

                    Dianne Car MD Unavailable         Unavailable

 

                    Dianne Car MD Unavailable         Unavailable

 

                    Dianne Car MD Unavailable         Unavailable

 

                    Dianne Car MD Unavailable         Unavailable

 

                    Dianne Car MD Unavailable         Unavailable

 

                    Dianne Car MD Unavailable         Unavailable

 

                    Dianne Car MD Unavailable         Unavailable

 

                    Dianne Car MD Unavailable         Unavailable

 

                    Dianne Car MD Unavailable         Unavailable

 

                    Chela Zacarias FNP FNP Unavailable         Unavailable

 

                    SHANNON Zacarias FNP Unavailable         Unavailable

 

                    SHANNON Zacarias FNP Unavailable         Unavailable

 

                    SHANNON Zacarias FNP Unavailable         Unavailable

 

                    SHANNON Zacarias FNP Unavailable         Unavailable

 

                    Rell, A Chela FNP Unavailable         Unavailable

 

                    Rell, A Chela FNP Unavailable         Unavailable

 

                    Rell, A Chela FNP Unavailable         Unavailable

 

                    Rell, A Chela FNP Unavailable         Unavailable

 

                    Rell, A Chela FNP Unavailable         Unavailable

 

                    Rell, A Chela FNP Unavailable         Unavailable

 

                    Rell, A Chela FNP Unavailable         Unavailable

 

                    Rell, A Chela FNP Unavailable         Unavailable

 

                    Rell, A Chela FNP Unavailable         Unavailable

 

                    Rell, A Chela FNP Unavailable         Unavailable

 

                    Rell, A Chela FNP Unavailable         Unavailable

 

                    Rell, A Chela FNP Unavailable         Unavailable

 

                    Rell, A Chela FNP Unavailable         Unavailable

 

                    Rell, A Chela FNP Unavailable         Unavailable

 

                    Rell, A Chela FNP Unavailable         Unavailable

 

                    Rell, A Chela FNP Unavailable         Unavailable

 

                    Rell, A Chela FNP Unavailable         Unavailable

 

                    Rell, A Chela FNP Unavailable         Unavailable

 

                    Rell, A Chela FNP Unavailable         Unavailable

 

                    Rell, A Chela FNP Unavailable         Unavailable

 

                    Rell, A Chela FNP Unavailable         Unavailable

 

                    Rell, A Chela FNP Unavailable         Unavailable

 

                    Rell, A Chela FNP Unavailable         Unavailable

 

                    Rell, A Chela FNP Unavailable         Unavailable

 

                    EGORHO, F FRANCK FPMHNP Unavailable         Unavailable

 

                    EGORHO, F FRANCK FPMHNP Unavailable         Unavailable

 

                    EGORHO, F FRANCK FPMHNP Unavailable         Unavailable

 

                    EGORHO, F FRANCK FPMHNP Unavailable         Unavailable

 

                    EGORHO, F FRANCK FPMHNP Unavailable         Unavailable

 

                    EGORHO, F FRANCK FPMHNP Unavailable         Unavailable



                                  



Re-disclosure Warning

          The records that you are about to access may contain information from 
federally-assisted alcohol or drug abuse programs. If such information is 
present, then the following federally mandated warning applies: This information
has been disclosed to you from records protected by federal confidentiality 
rules (42 CFR part 2). The federal rules prohibit you from making any further 
disclosure of this information unless further disclosure is expressly permitted 
by the written consent of the person to whom it pertains or as otherwise 
permitted by 42 CFR part 2. A general authorization for the release of medical 
or other information is NOT sufficient for this purpose. The Federal rules 
restrict any use of the information to criminally investigate or prosecute any 
alcohol or drug abuse patient.The records that you are about to access may 
contain highly sensitive health information, the redisclosure of which is 
protected by Article 27-F of the [facility] Public Health law. If you 
continue you may have access to information: Regarding HIV / AIDS; Provided by 
facilities licensed or operated by the [facility] Office of Mental Health; 
or Provided by the [facility] Office for People With Developmental 
Disabilities. If such information is present, then the following [place] 
mandated warning applies: This information has been disclosed to you from 
confidential records which are protected by state law. State law prohibits you 
from making any further disclosure of this information without the specific 
written consent of the person to whom it pertains, or as otherwise permitted by 
law. Any unauthorized further disclosure in violation of state law may result in
a fine or prison sentence or both. A general authorization for the release of 
medical or other information is NOT sufficient authorization for further disc
losure.                                                                         
    



Allergies and Adverse Reactions

          



           Type       Description Substance  Reaction   Status     Data Source(s

)

 

                    Propensity to adverse reactions to substance nkda           

     24 HR Bupropion Hydrochloride 

150 MG Extended Release Oral Tablet                     Active              [facility]

[facility] ([facility])



                                                                                
       



Encounters

          



           Encounter  Providers  Location   Date       Indications Data Source(s

)

 

                Outpatient      Attender: FRANCK GUTIERREZ [facility] 2020 

10:00:00 AM EDT - 2020 10:00:00 AM EDT                           [facility]

 ([facility])

 

                      Attender: FRANCK LOPEZELIZABETH            2020 12:00:

00 AM EDT            [facility] ([facility])

 

                Outpatient      Attender: Dianne Car MD [email or web address]  12:00:00 AM 

EDT - 2020 02:23:26 PM EDT                           [facility]

 

           [facility]00 P

M EDT            [facility]

 

           [facility]: [facility]         [facility] 02:54:00 P

M EDT            [facility]

 

           [facility]: [facility]         [facility] 02:53:00 P

M EDT            [facility]

 

           [facility]: [facility]         [facility] 01:20:01 P

M EDT            [facility]

 

           [facility]: [facility]         [facility] 01:18:00 P

M EDT            [facility]

 

           Outpatient Attender: ULISSES Zacarias [facility]         2020 01:17:01 P

M EDT            [facility]

 

           Outpatient Attender: Chela Zacarias [facility]         2020 11:1

6:03 AM EDT            [facility]

 

           Outpatient Attender: ULISSES Zacarias [facility]         2020 10:51:00 A

M EDT            [facility]

 

           Outpatient Attender: ULISSES Zacarias [facility]         2020 10:50:00 A

M EDT            [facility]

 

           Outpatient Attender: ULISSES Zacarias [facility]         07/15/2020 05:06:01 P

M EDT            [facility]

 

           Outpatient Attender: Chela Zacarias [facility]         07/15/2020 03:4

2:00 PM EDT            [facility]

 

           Outpatient Attender: ULISSES Zacarias [facility]         07/15/2020 11:50:00 A

M EDT            [facility]

 

           Outpatient Attender: ULISSES Zacarias [facility]         2020 12:07:01 P

M EDT            [facility]

 

           Outpatient Attender: ULISSES Zacarias [facility]         2020 09:36:01 A

M EDT            [facility]

 

           Outpatient Attender: Chela Zacarias [facility]         06/10/2020 03:4

4:00 PM EDT            [facility]

 

           Outpatient Attender: ULISSES Zacarias [facility]         2020 02:47:00 P

M EDT            [facility]

 

           Outpatient Attender: ULISSES Zacarias [facility]         2020 08:01:14 P

M EDT            [facility]



                                                                                
                                                                                
                                                                                
                         



Functional Status

                                                                                
                 



Medications

          



          Medication Brand Name Start Date Product Form Dose      Route     Admi

nistrative 

Instructions Pharmacy Instructions Status     Indications Reaction   Description

 Data 

Source(s)

 

                    Meclizine Hydrochloride 25 MG Oral Tablet MECLIZINE HCL     

  2021 12:00:00 AM 

EST             tablet          30                              TAKE ONE TABLET 

BY MOUTH THREE TIMES A DAY AS NEEDED FOR 

DIZZINESS                 TAKE ONE TABLET BY MOUTH THREE TIMES A DAY AS NEEDED F

OR DIZZINESS 

SOLD: 2021                                                 [facility]

 

          5 mg                2021 12:00:00 AM EST tablet    60           

       TAKE ONE TABLET BY MOUTH TWICE A 

DAY        TAKE ONE TABLET BY MOUTH TWICE A DAY SOLD: 2021                

                  [facility]

 

                    Meclizine Hydrochloride 25 MG Oral Tablet MECLIZINE HCL     

  2021 12:00:00 AM 

EST             tablet          30                              TAKE ONE TABLET 

BY MOUTH THREE TIMES A DAY AS NEEDED FOR 

DIZZINESS                 TAKE ONE TABLET BY MOUTH THREE TIMES A DAY AS NEEDED F

OR DIZZINESS 

SOLD: 2021                                                 [facility]

 

          50 mg               2021 12:00:00 AM EST tablet    60           

       TAKE ONE TABLET BY MOUTH TWICE A 

DAY AS NEEDED FOR HR > 120              TAKE ONE TABLET BY MOUTH TWICE A DAY AS 

NEEDED FOR HR

> 120        SOLD: 2021                                        [facility]

s

 

          10 mg               2020 12:00:00 AM EDT tablet    30           

       TAKE ONE TABLET BY MOUTH EVERY 

DAY        TAKE ONE TABLET BY MOUTH EVERY DAY SOLD: 2020                  

                [facility]

 

          400 mg              2020 12:00:00 AM EDT suspension,extended rel

 syring 1                   INJECT 

400MG INTRAMUSCULARLY ONCE EVERY FOUR WEEKS INJECT 400MG INTRAMUSCULARLY ONCE 

EVERY FOUR WEEKS SOLD: 2020                                        [facility]

 

           aripiprazole 10 MG Oral Tablet aripiprazole 2020 12:00:00 AM ED

T            10 mg      

by mouth             completed             [id number] aripiprazole by mouth [id number]

2020 

09/10/2020 once a day 30    10 mg tablet             [id number] [facility]

ka Egorho 

[id number]          Nurse Practitioner                      [facility] ([facility]

 

          10 mg               2020 12:00:00 AM EDT tablet    7            

       TAKE ONE TABLET BY MOUTH EVERY DAY

AT BEDTIME FOR MOOD       TAKE ONE TABLET BY MOUTH EVERY DAY AT BEDTIME FOR MOOD

 SOLD:

2020                                                      [facility]

 

          400 mg              2020 12:00:00 AM EDT suspension,extended rel

 syring 1                   INJECT 

400MG INTRAMUSCULARLY ONCE EVERY 4 WEEKS FOR MOOD INJECT 400MG INTRAMUSCULARLY 

ONCE EVERY 4 WEEKS FOR MOOD SOLD: 2020                                    

    [facility]

 

          10 mg               2020 12:00:00 AM EDT tablet    7            

       TAKE ONE TABLET BY MOUTH EVERY DAY

AT BEDTIME FOR MOOD       TAKE ONE TABLET BY MOUTH EVERY DAY AT BEDTIME FOR MOOD

 SOLD:

2020                                                      [facility]

 

          10 mg               2020 12:00:00 AM EDT tablet    7            

       TAKE ONE TABLET BY MOUTH EVERY DAY

AT BEDTIME FOR MOOD       TAKE ONE TABLET BY MOUTH EVERY DAY AT BEDTIME FOR MOOD

 SOLD:

2020                                                      [facility]



                                                                                
                                                                                
      



Insurance Providers

          



             Payer name   Policy type / Coverage type Policy ID    Covered party

 ID Covered 

party's relationship to yin Policy Yin             Plan Information

 

          [facility] MCRHMO           [id number]           SP             

     [id number]

 

          EMEDNY              [id number]            SP                  [id number]

 

          [facility] MCRHMO           [id number]           SP             

     [id number]

 

          [employer] COMMUNITY PLAN MCDHMO           [id number]           SP           

       [id number]

 

          MEDICARE            [id number]           SP                  [id number]

[id number]

 

          [employer] COMMUNITY [employer]AN MCDHMO           [id number]           SP           

       [id number]

 

          [facility] MCR HMO           [id number]           SP          

        [id number]

 

          MEDICARE COMPLETE           [id number]           SP                  [id number]

[id number]

 

          MEDICARE            [id number]           SP                  [id number]

[id number]

 

          [facility] MEDICARE           [id number]           [place]                 [id number]

[id number]

 

          [facility]         [id number]           S      

             [id number]

 

          Medicaid  S         [id number]            S                   [id number]

 

          [facility]         [id number]           S      

             [id number]

 

          EMEDNY              [id number]            SP                  [id number]

 

          [facility] MCRHMO           [id number]           SP             

     [id number]

 

          [facility] [employer] P         [id number]           S      

             [id number]

 

          MEDICAID            [id number]            SP                  [id number]

 

          MEDICAID  M         [id number]            S                   [id number]

 

          MEDICARE  C         [id number]           S                   [id number]

A

 

          [facility] MCRHMO           [id number]           SP             

     [id number]

 

          MEDICAID            [id number]            SP                  [id number]

 

          [facility]         [id number]           S      

             [id number]

 

          Medicaid  S         [id number]            S                   [id number]

 

          SELF PAY ONLY           [id number]           SP                  [id number]

[id number]

 

          [facility] [employer] P         [id number]           S      

             [id number]

 

          Medicare  S         [id number]           S                   [id number]

A

 

          MEDICARE            [id number]           SP                  [id number]

A

 

          Managed Care - Community Plan [facility] P         [id number]   

        S                   [id number]

 

          Medicare  P         [id number]           S                   [id number]

A

 

          Medicaid  S         [id number]            S                   [id number]

 

          Managed Care - Community Plan [employer] P         [id number]   

        S                   [id number]

 

          [employer] COMMUNITY PLAN MCDHMO           [id number]           SP           

       [id number]

 

          Managed Care - Community Plan [employer] P         [id number]   

        S                   [id number]

 

          Medicaid  S         [id number]            S                   [id number]

 

          Managed Care BCBS O         [id number]           S                  

 [id number]

 

          BLUE CROSS [employer] PLAN           [id number]           SP            

      [id number]

 

          [facility] BLUE            [id number]           SP                  [id number]

[id number]

 

                                                                       



                                                                                
                                                                                
                                                                                
                                                                                
                                                                                
                                                     



Problems, Conditions, and Diagnoses

          



           Code       Display Name Description Problem Type Effective Dates Data

 Source(s)

 

                    F32.0               Major depressive disorder, single episod

e, mild Major Depressive Disorder,

Single episode, Mild Condition           2020 12:00:00 AM EDT [facility] ([facility] 

[facility])

 

                          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

 due to a substance or known physiological 

condition                 Other Specified Schizophrenia Spectrum and Other Psych

otic Disorder 

Condition                 2020 12:00:00 AM EDT [facility] ([facility])

 

             I48.92       Unspecified atrial flutter Unspecified atrial flutter 

Diagnosis    

2020 12:54:58 PM EDT              [facility]



                                                                                
                                     



Surgeries/Procedures

          



             Procedure    Description  Date         Indications  Data Source(s)

 

                    OFFICE OUTPATIENT VISIT 15 MINUTES                      12:00:00 AM EDT - 2020 

12:00:00 AM EDT                                     [facility] ([facility])

 

             OFFICE OUTPATIENT VISIT 15 MINUTES              2020 12:00:00

 AM EDT              [facility] ([facility])



                                                                                
                 



Results

          



                    ID                  Date                Data Source

 

                    [id number]             2021 04:36:00 PM EST [facility]









          Name      Value     Range     Interpretation Code Description Data Carlene

rce(s) Supporting 

Document(s)

 

                                        SARS coronavirus 2 RNA [Presence] in Res

piratory specimen by MARYJO with probe 

detection  NEGATIVE                                    [facility]      

 

                                        This lab was ordered by [facility] a

nd reported by [facility].

 









                    ID                  Date                Data Source

 

                    [id number]    2020 10:48:24 AM EDT [facility]

 

                                        Measurements & CalculationsHeight:      

       67 inches (5 ft. 7 in.) 170.18 cm

    Weight:             273 pounds      124.09 kg    Body Mass Index (BMI):  
42.91BMI Interpretation:         Morbidly ObeseBody Surface Area (BSA):    
2.31Weight Management Education Done (Nutrition/Physical Activity)Vital 
SignsTemperature:        98.4FPulse Rate:         76 beats/minuteRespiratory 
Rate:   18 respirations/minuteBlood Pressure:     113/74     O2 Saturation:  94%
         Vital Signs performed by: Allyn Liu MA,  2020 10:59 
AMInitial Intake Information From: patientRoom #: 14Infectious Disease / Travel 
ScreeningRecent travel for you or any close contacts? NoHave you had any close 
contact with anyone diagnosed with or under investigation for COVID-19 
(coronavirus)? NoFever? NoRespiratory symptoms: cough, cold, congestion, 
shortness of breath, difficulty breathing? NoLoss of smell? NoLoss of taste? 
NoSmoking, Tobacco, Vaping or Smoke Exposure StatusSmoke Status: former 
smokerTobacco Use: NoDo you vape? NoMenstrual HistoryAny possibility of 
pregnancy? NoHealthcare HistorySince your last office visit...Have you been 
admitted to the hospital? Yes [facility]  [facility] you been to an emergency room (ER) 
or urgent care clinic? NoHave you seen another healthcare  provider? NoHave you 
seen a dentist? NoIntake performed by:  Allyn Liu MA,  2020 10:51 
AMRate Your HealthIn general, would you say your health is? Very GoodPain 
AssessmentAre you currently having any pain which...    You would like your 
provider to address? No    Affects your activity level? NoDepression Screening -
 PHQ-2Over the last two weeks, have you...    Had little interest or pleasure in
 doing things? Several days    Been feeling down, depressed, or hopeless? 
Several days PHQ-2 Score: 2Anxiety Screening - NINFA-2Over the last two weeks, 
have you been...    Feeling nervous, anxious, or on edge? Several days NINFA-2 
Score: 3Generalized Anxiety Disorder 7-Item Screening (NINFA-7)Answer Guide:0 = 
Not at all1 = Several days2 = Over half the days3 = Nearly every dayOver the 
last 2 weeks, how often have you been bothered by the following problems?Feeling
 nervous, anxious, or on edge: 1Not being able to stop or control worryinWorrying too much about different things: 2Trouble relaxinBeing so restless
 that it's hard to sit still: 0Becoming easily annoyed or irritable: 2Feeling 
afraid as if something awful might happen: 1Answer Guide:0 = Not difficult at 
all1 = Somewhat difficult2 = Very difficult3 = Extremely difficultHow difficult 
have these made it for you to do your work, take care of things at home, or get 
along with other people? 1GAD-7 Screening Results NINFA-2 Score: 3GAD-7 Score: 
9Functional Impairment: Somewhat difficultRecommendation: Mild anxietyPHQ-9 1.  
Over the last 2 weeks, patient reports the following frequency of symptoms:     
a. Little interest or pleasure in doing things                               -
Several days    b. Feeling down, depressed, or hopeless                         
         -Several days    c. Trouble falling asleep, staying asleep, or sleeping
 too much   -Not at all    d. Feeling tired or having little energy             
                          -Several days    e. Poor appetite or overeating       
                                           -Nearly every day    f.  Feeling bad 
about yourself, feeling that you are a failure, or feeling that you have let 
yourself or your        family down                                             
                          -Nearly every day    g. Trouble concentrating on 
things such as reading the newspaper        or watching television              
                                            -Several days    h. Moving or 
speaking so slowly that other people could have noticed. Or being so fidgety or 
restless that        you have been moving around a lot more than usual          
     -Not at all     i. Thinking that you would be better off dead or that you 
want         to hurt yourself in some way                                       
        -Not at all2. If you checked off any problems, how difficult have these 
problems made it for you to do your work, take       care of things at home, or 
get along with other people?           -Somewhat DifficultToday's PHQ-9 Results 
   Score: 10    Severity: Moderate    Diagnosis Recommendation: No 
recommendation    Functional Impairment: Somewhat DifficultScreening, Brief 
Intervention, & Referral to Treatment (SBIRT)Pre-Screening Questions How many 
times have you have 4 or more drinks in a day? 0How many times have you used an 
illegal drug or used a prescription medication for a non-medical reason? 
0Performed by: Allyn Liu MA,  2020 10:51 AMPatient History 
Social/Personal History: Chief ComplaintHDHistory of Present Illness 
(HPI)Recently hospitalized for worsenng of her schizophrenia. Started on Abilify
 and doing better now. [facility] Psych[facility] in one week. No thoughts of self 
harm.Also had atrial flutter as a an inpatient and this was assessed. She has an
 appointment with her Cardiologist in 2 weeks. Has had issues in the past with 
atrial fibrillation. Currently asymptomatic.HPI performed by: Guido Castillo MD,  
2020 11:03 AMTransitions of [facility] InboundProblem ReviewProblem List was 
reviewed and/or updated during this visit.Medication Reconciliation & 
ReviewMedication List was reviewed and/or updated during this visit, including 
review of any over-the-counter medications, herbal therapies, and/or 
supplements.Allergy ReviewAllergy List was reviewed and/or updated during this 
visit.Adult Preventive CareLabs/Meds/Other Counseling-Nutrition and Physical 
Activity:BMI Interpretation: Morbidly Obese (2020)  Counseling: Done 
(2020)  Physical Activity: Done (2020)Cancer Screening  Mammogram 
Reviewed: Previous Comments: 2-3years ago unsure of where (2018)Today's 
Comments: [facility] Scre[facility]: Patient refused colorectal screeningReview of
 Systems General: Denies chills, dizziness, fatigue, fever.  Cardiovascular: 
Denies chest pain.  Respiratory: Denies shortness of breath.  Gastrointestinal: 
Denies constipation.  Genitourinary: Denies burning with urination.  Physical 
ExamGeneral Appearance: well nourished, well hydrated, no acute 
distressRespiratory, Auscultation: clear to auscultation bilaterally; no rales, 
rhonchi, or wheezesRespiratory, Effort: no intercostal retractions or use of 
accessory musclesCardiovascular, Auscultation: S1, S2 audible; no murmur, rub, 
or gallop; RRRGait & Station: normalSkin, Inspection: no rashes, lesions, or 
ulcerationsOrientation: oriented to time, place, and personMood & Affect: no 
depression, anxiety, or agitationJudgment & Insight: intactCare Management Plan 
Transitions of CareInboundRate Your HealthIn general, would you say your health 
is? Very GoodAssessment & Plan Problems:Assessed:Schizophrenia (ICD-295.90) 
(OHI99-R01.9)   Assessment:    Instructions: With recent hospital admit.Doing 
much better now.Continue current plan and recheck as scheduled with Psychiatrist
 next week.Unspecified atrial fibrillation (XKY80-S29.91)   Assessment:    
Instructions: With recent flare of this while inpatient.Currently symptoms 
free.Keep appointment in 2 weeks with Cardiologist.Patient Instructions/Care 
Plan: Schizophrenia: With recent hospital admit.Doing much better now.Continue 
current plan and recheck as scheduled with Psychiatrist next week.Unspecified 
atrial fibrillation: With recent flare of this while inpatient.Currently 
symptoms free.Keep appointment in 2 weeks with Cardiologist.----------Plan 
developed in collaboration with patient and/or familyMedications:ABILIFY MA
INTENA 400 MG INTRAMUSCULAR PREFILLED SYRINGEABILIFY 10 MG ORAL TABLETADULT 
ASPIRIN REGIMEN 81 MG ORAL TABLET DELAYED RELEASEALCOHOL WIPES PADBLOOD GLUCOSE 
TEST IN VITRO STRIPLANCETSMedication Changes:Added: ABILIFY 10 MG ORAL 
TABLETABILIFY MAINTENA 400 MG INTRAMUSCULAR PREFILLED SYRINGERemoved:CARVEDILOL 
12.5 MG ORAL TABLET-by mouth, twice a day for htn, DOCUSATE SODIUM 100 MG ORAL 
CAPSULE-by mouth, twice a day for constipation, TRAZODONE HCL 50 MG ORAL TABLET-
by mouth, at bedtime as needed for insomnia, ABILIFY 10 MG ORAL TABLET-by mouth 
twice daily for mood, ABILIFY MAINTENA 400 MG INTRAMUSCULAR SUSPENSION 
RECONSTITUTED ER-IM, every 4 weeks for psychosis for 28 days, ARISTADA 882 
MG/3.2ML INTRAMUSCULAR PREFILLED SYRINGE-IM, every 8 weeksAllergies:AMOXICILLIN 
(Moderate)Orders:Adult - Ofc Vst, EST, Level III [CPT-22632] Electronically 
signed by Guido Castillo MD on 2020 at 11:15 
AM________________________________________________________________________ 









          Name      Value     Range     Interpretation Code Description Data Carlene

rce(s) Supporting 

Document(s)

 

                                                                       







                                        Procedure

 

                                          



                                                                                
                           



Social History

          



           Code       Duration   Value      Status     Description Data Source(s

)

 

             Smoking      2020 12:00:00 AM EDT Unknown if ever smoked comp

leted    Unknown if 

ever smoked                             [facility] ([facility])



                                                                                
                 



Vital Signs

          



                    ID                  Date                Data Source

 

                    UNK                                      









           Name       Value      Range      Interpretation Code Description Data

 Source(s)

 

             Diastolic blood pressure 0 mm[Hg]                  Normal (applies 

to non-numeric results) 0 

mm[Hg]                                  [facility] ([facility])

 

             Systolic blood pressure 0 mm[Hg]                  Normal (applies t

o non-numeric results) 0 

mm[Hg]                                  [facility] ([facility])

 

                Body mass index (BMI) [Ratio] 0.00 kg/m2                      No

rmal (applies to non-numeric 

results)                  0.00 kg/m2                [facility] ([facility])

 

             Body weight Measured 0.00 lbs                  Normal (applies to n

on-numeric results) 0.00 lbs

                                        [facility] ([facility])

 

           Body height 0.00 in               Normal (applies to non-numeric resu

lts) 0.00 in    [facility] 

([facility])

## 2021-04-18 ENCOUNTER — HOSPITAL ENCOUNTER (EMERGENCY)
Dept: HOSPITAL 53 - M ED | Age: 66
LOS: 1 days | Discharge: HOME | End: 2021-04-19
Payer: MEDICAID

## 2021-04-18 VITALS — BODY MASS INDEX: 43.32 KG/M2 | WEIGHT: 269.56 LBS | HEIGHT: 66 IN

## 2021-04-18 VITALS — SYSTOLIC BLOOD PRESSURE: 142 MMHG | DIASTOLIC BLOOD PRESSURE: 71 MMHG

## 2021-04-18 VITALS — SYSTOLIC BLOOD PRESSURE: 126 MMHG | DIASTOLIC BLOOD PRESSURE: 67 MMHG

## 2021-04-18 VITALS — DIASTOLIC BLOOD PRESSURE: 65 MMHG | SYSTOLIC BLOOD PRESSURE: 126 MMHG

## 2021-04-18 VITALS — DIASTOLIC BLOOD PRESSURE: 65 MMHG | SYSTOLIC BLOOD PRESSURE: 136 MMHG

## 2021-04-18 VITALS — DIASTOLIC BLOOD PRESSURE: 70 MMHG | SYSTOLIC BLOOD PRESSURE: 145 MMHG

## 2021-04-18 VITALS — DIASTOLIC BLOOD PRESSURE: 68 MMHG | SYSTOLIC BLOOD PRESSURE: 115 MMHG

## 2021-04-18 VITALS — SYSTOLIC BLOOD PRESSURE: 119 MMHG | DIASTOLIC BLOOD PRESSURE: 64 MMHG

## 2021-04-18 VITALS — DIASTOLIC BLOOD PRESSURE: 63 MMHG | SYSTOLIC BLOOD PRESSURE: 127 MMHG

## 2021-04-18 DIAGNOSIS — Z79.899: ICD-10-CM

## 2021-04-18 DIAGNOSIS — Z88.1: ICD-10-CM

## 2021-04-18 DIAGNOSIS — R42: Primary | ICD-10-CM

## 2021-04-18 DIAGNOSIS — F20.9: ICD-10-CM

## 2021-04-18 DIAGNOSIS — Z91.19: ICD-10-CM

## 2021-04-18 DIAGNOSIS — Z87.891: ICD-10-CM

## 2021-04-18 DIAGNOSIS — I48.91: ICD-10-CM

## 2021-04-18 LAB
APTT BLD: 24.4 SECONDS (ref 24.2–38.5)
BASOPHILS # BLD AUTO: 0 10^3/UL (ref 0–0.2)
BASOPHILS NFR BLD AUTO: 0.3 % (ref 0–1)
BUN SERPL-MCNC: 20 MG/DL (ref 7–18)
CALCIUM SERPL-MCNC: 8.8 MG/DL (ref 8.8–10.2)
CHLORIDE SERPL-SCNC: 104 MEQ/L (ref 98–107)
CK MB CFR.DF SERPL CALC: 2.7
CK MB SERPL-MCNC: < 1 NG/ML (ref ?–3.6)
CK SERPL-CCNC: 37 U/L (ref 26–192)
CO2 SERPL-SCNC: 31 MEQ/L (ref 21–32)
CREAT SERPL-MCNC: 0.69 MG/DL (ref 0.55–1.3)
EOSINOPHIL # BLD AUTO: 0.1 10^3/UL (ref 0–0.5)
EOSINOPHIL NFR BLD AUTO: 0.6 % (ref 0–3)
GFR SERPL CREATININE-BSD FRML MDRD: > 60 ML/MIN/{1.73_M2} (ref 45–?)
GLUCOSE SERPL-MCNC: 160 MG/DL (ref 70–100)
HCT VFR BLD AUTO: 42.5 % (ref 36–47)
HGB BLD-MCNC: 13.5 G/DL (ref 12–15.5)
INR PPP: 0.99
LYMPHOCYTES # BLD AUTO: 1.9 10^3/UL (ref 1.5–5)
LYMPHOCYTES NFR BLD AUTO: 19.6 % (ref 24–44)
MCH RBC QN AUTO: 29.3 PG (ref 27–33)
MCHC RBC AUTO-ENTMCNC: 31.8 G/DL (ref 32–36.5)
MCV RBC AUTO: 92.4 FL (ref 80–96)
MONOCYTES # BLD AUTO: 0.6 10^3/UL (ref 0–0.8)
MONOCYTES NFR BLD AUTO: 5.9 % (ref 2–8)
NEUTROPHILS # BLD AUTO: 6.9 10^3/UL (ref 1.5–8.5)
NEUTROPHILS NFR BLD AUTO: 73.2 % (ref 36–66)
PLATELET # BLD AUTO: 201 10^3/UL (ref 150–450)
POTASSIUM SERPL-SCNC: 4.2 MEQ/L (ref 3.5–5.1)
PROTHROMBIN TIME: 13.3 SECONDS (ref 12.5–14.3)
RBC # BLD AUTO: 4.6 10^6/UL (ref 4–5.4)
SODIUM SERPL-SCNC: 141 MEQ/L (ref 136–145)
TROPONIN I SERPL-MCNC: < 0.02 NG/ML (ref ?–0.1)
WBC # BLD AUTO: 9.4 10^3/UL (ref 4–10)

## 2021-04-18 NOTE — REPVR
PROCEDURE INFORMATION: 

Exam: CT Head Without Contrast 

Exam date and time: 4/18/2021 7:42 PM 

Age: 65 years old 

Clinical indication: Weakness, extremity; Additional info: CVA - nursing 

interventions must not delay CT 



TECHNIQUE: 

Imaging protocol: Computed tomography of the head without contrast. 

Radiation optimization: All CT scans at this facility use at least one of these 

dose optimization techniques: automated exposure control; mA and/or kV 

adjustment per patient size (includes targeted exams where dose is matched to 

clinical indication); or iterative reconstruction. 



COMPARISON: 

CT Head without contrast 1/18/2021 1:19 PM 



FINDINGS: 

Brain: Normal. No hemorrhage. Unremarkable white matter. No mass effect. 

Cerebral ventricles: No ventriculomegaly. 

Bones/joints: Unremarkable. No acute fracture. 

Paranasal sinuses: Visualized sinuses are unremarkable. No fluid levels. 

Mastoid air cells: Visualized mastoid air cells are well aerated. 

Soft tissues: Unremarkable. 



IMPRESSION: 

No acute intracranial abnormality. 



Electronically signed by: Alfredo Davila On 04/18/2021  20:06:46 PM

## 2021-04-18 NOTE — REPVR
PROCEDURE INFORMATION: 

Exam: XR Chest 

Exam date and time: 4/18/2021 8:02 PM 

Age: 65 years old 

Clinical indication: Other: CVA 



TECHNIQUE: 

Imaging protocol: XR of the chest. 

Views: 1 view. 



COMPARISON: 

[facility] PORTABLE CHEST X-RAY 1/18/2021 1:14 PM 



FINDINGS: 

Lungs: Unremarkable. No consolidation. 

Pleural spaces: Unremarkable. No pleural effusion. No pneumothorax. 

Heart/Mediastinum: Unremarkable. No cardiomegaly. 

Bones/joints: Unremarkable. 



IMPRESSION: 

No acute findings. 



Electronically signed by: Alfredo Davila On 04/18/2021  20:07:09 PM

## 2021-04-18 NOTE — REPVR
PROCEDURE INFORMATION: 

Exam: MR Head Without Contrast 

Exam date and time: 4/18/2021 10:14 PM 

Age: 65 years old 

Clinical indication: Dizziness; Additional info: Severe dizziness, HX of afib, 

noncompliance 



TECHNIQUE: 

Imaging protocol: MR of the head without contrast. 



COMPARISON: 

MRI-Brain without Contrast 1/18/2021 7:56 PM 



FINDINGS: 

Brain: Unremarkable. No acute infarct. No hemorrhage. No significant white 

matter disease. No edema. No midline shift or mass effect. 

Cerebral ventricles: Normal. No ventriculomegaly. 

Bones/joints: Unremarkable. 

Paranasal sinuses: Normal as visualized. No acute sinusitis. 

Mastoid air cells: Normal as visualized. No mastoid effusion. 

Orbits: Unremarkable. 

Soft tissues: Unremarkable. 



IMPRESSION: 

No acute findings. 



Electronically signed by: Jake Sanabria On 04/18/2021  22:32:32 PM

## 2021-04-18 NOTE — REPVR
PROCEDURE INFORMATION: 

Exam: MRA Head Without Contrast; Arteriography 

Exam date and time: 4/18/2021 10:14 PM 

Age: 65 years old 

Clinical indication: Dizziness and giddiness; Additional info: Severe 

dizziness, HX of afib, noncompliance 



TECHNIQUE: 

Imaging protocol: Magnetic resonance angiography head without contrast. Exam 

focused on the arteries. 



COMPARISON: 

MRA BRAIN W/O CONTRAST 1/18/2021 7:56 PM 



FINDINGS: 



ANTERIOR CIRCULATION: 

Right internal carotid artery: Intracranial segment is patent with no 

significant stenosis. No aneurysm. 

Right middle cerebral artery: No occlusion or significant stenosis. No 

aneurysm. 

Right anterior cerebral artery: No occlusion or significant stenosis. No 

aneurysm. 



Left internal carotid artery: Intracranial segment is patent with no 

significant stenosis. No aneurysm. 

Left middle cerebral artery: No occlusion or significant stenosis. No aneurysm. 

Left anterior cerebral artery: The left anterior cerebral artery A1 segment is 

absent. Distal left anterior cerebral artery is supplied through the A-comm and 

is patent. 



POSTERIOR CIRCULATION: 

Right vertebral artery: No occlusion or significant stenosis. No aneurysm. 

Left vertebral artery: No occlusion or significant stenosis. No aneurysm. 

Basilar artery: No occlusion or significant stenosis. No aneurysm. 

Right posterior cerebral artery: Fetal origin of the right PCA without 

significant stenosis or occlusion. 

Left posterior cerebral artery: No occlusion or significant stenosis. No 

aneurysm. 



IMPRESSION: 

No significant stenosis, aneurysm, or large vessel occlusion. 



Electronically signed by: Jake Sanabria On 04/18/2021  22:29:37 PM

## 2021-04-19 NOTE — ECGEPIP
[facility] - ED

                                       

                                       Test Date:    2021

Pat Name:     JOSE R MAK             Department:   

Patient ID:   [id number]                 Room:         -

Gender:       Female                   Technician:   LR

:          1955               Requested By: OLEKSANDR GTZ

Order Number: [id number]     Reading MD:   Oleksandr Liu

                                 Measurements

Intervals                              Axis          

Rate:         63                       P:            63

ME:           194                      QRS:          53

QRSD:         82                       T:            40

QT:           422                                    

QTc:          431                                    

                           Interpretive Statements

Sinus rhythm with marked sinus arrhythmia

Low voltage QRS throughout

Nonspecific ST-T wave abnormalities

Rate increased from tracing done 21

Electronically Signed on 2021 9:26:50 EDT by Oleksandr Liu

## 2021-04-22 ENCOUNTER — HOSPITAL ENCOUNTER (EMERGENCY)
Dept: HOSPITAL 53 - M ED | Age: 66
Discharge: HOME | End: 2021-04-22
Payer: MEDICARE

## 2021-04-22 VITALS — BODY MASS INDEX: 42.36 KG/M2 | HEIGHT: 66 IN | WEIGHT: 263.56 LBS

## 2021-04-22 VITALS — DIASTOLIC BLOOD PRESSURE: 78 MMHG | SYSTOLIC BLOOD PRESSURE: 163 MMHG

## 2021-04-22 DIAGNOSIS — R42: Primary | ICD-10-CM

## 2021-04-22 DIAGNOSIS — Z88.1: ICD-10-CM

## 2021-04-22 DIAGNOSIS — E78.5: ICD-10-CM

## 2021-04-22 LAB
ALBUMIN SERPL BCG-MCNC: 3.4 GM/DL (ref 3.2–5.2)
ALT SERPL W P-5'-P-CCNC: 24 U/L (ref 12–78)
APAP SERPL-MCNC: < 2 UG/ML (ref 10–30)
BASOPHILS # BLD AUTO: 0 10^3/UL (ref 0–0.2)
BASOPHILS NFR BLD AUTO: 0.4 % (ref 0–1)
BILIRUB CONJ SERPL-MCNC: 0.1 MG/DL (ref 0–0.2)
BILIRUB SERPL-MCNC: 0.4 MG/DL (ref 0.2–1)
BUN SERPL-MCNC: 18 MG/DL (ref 7–18)
CALCIUM SERPL-MCNC: 8.3 MG/DL (ref 8.8–10.2)
CHLORIDE SERPL-SCNC: 105 MEQ/L (ref 98–107)
CK MB CFR.DF SERPL CALC: 1.29
CK MB SERPL-MCNC: 1.1 NG/ML (ref ?–3.6)
CK SERPL-CCNC: 85 U/L (ref 26–192)
CO2 SERPL-SCNC: 25 MEQ/L (ref 21–32)
CREAT SERPL-MCNC: 0.57 MG/DL (ref 0.55–1.3)
EOSINOPHIL # BLD AUTO: 0.1 10^3/UL (ref 0–0.5)
EOSINOPHIL NFR BLD AUTO: 1.7 % (ref 0–3)
GFR SERPL CREATININE-BSD FRML MDRD: > 60 ML/MIN/{1.73_M2} (ref 45–?)
GLUCOSE SERPL-MCNC: 127 MG/DL (ref 70–100)
HCT VFR BLD AUTO: 40.7 % (ref 36–47)
HGB BLD-MCNC: 13 G/DL (ref 12–15.5)
LYMPHOCYTES # BLD AUTO: 1.8 10^3/UL (ref 1.5–5)
LYMPHOCYTES NFR BLD AUTO: 24.3 % (ref 24–44)
MCH RBC QN AUTO: 29.6 PG (ref 27–33)
MCHC RBC AUTO-ENTMCNC: 31.9 G/DL (ref 32–36.5)
MCV RBC AUTO: 92.7 FL (ref 80–96)
MONOCYTES # BLD AUTO: 0.7 10^3/UL (ref 0–0.8)
MONOCYTES NFR BLD AUTO: 8.8 % (ref 2–8)
NEUTROPHILS # BLD AUTO: 4.9 10^3/UL (ref 1.5–8.5)
NEUTROPHILS NFR BLD AUTO: 64.5 % (ref 36–66)
PLATELET # BLD AUTO: 193 10^3/UL (ref 150–450)
POTASSIUM SERPL-SCNC: 4.1 MEQ/L (ref 3.5–5.1)
PROT SERPL-MCNC: 6.9 GM/DL (ref 6.4–8.2)
RBC # BLD AUTO: 4.39 10^6/UL (ref 4–5.4)
SALICYLATES SERPL-MCNC: 1.8 MG/DL (ref 5–30)
SODIUM SERPL-SCNC: 138 MEQ/L (ref 136–145)
TROPONIN I SERPL-MCNC: < 0.02 NG/ML (ref ?–0.1)
WBC # BLD AUTO: 7.5 10^3/UL (ref 4–10)

## 2021-04-22 NOTE — REP
INDICATION:

Altered Mental Status.



COMPARISON:

None.



TECHNIQUE:

Axial CT images with multiplanar reformations.



FINDINGS:

No acute bleed or acute large vessel territorial infarct.  Ventricles, cisterns and

sulci are within normal limits.  No mass effect or midline shift.  No abnormal fluid

collections.



Paranasal sinuses and mastoid air cells are clear



IMPRESSION:

No acute findings.





<Electronically signed by Teodoro Monroe > 04/22/21 [id number]

## 2021-04-22 NOTE — ECGEPIP
[facility] - ED

                                       

                                       Test Date:    2021

Pat Name:     JOSE R MAK             Department:   

Patient ID:   [id number]                 Room:         -

Gender:       Female                   Technician:   chandana

:          1955               Requested By: RADHA GTZ

Order Number: [id number]     Reading MD:   Maddie Longoria

                                 Measurements

Intervals                              Axis          

Rate:         49                       P:            35

DE:           188                      QRS:          11

QRSD:         82                       T:            23

QT:           444                                    

QTc:          401                                    

                           Interpretive Statements

Sinus bradycardia

Low voltage QRS

prwp

decreased rate 21

Electronically Signed on 2021 18:19:08 EDT by Maddie Longoria

## 2022-05-03 ENCOUNTER — HOSPITAL ENCOUNTER (EMERGENCY)
Dept: HOSPITAL 53 - M ED | Age: 67
LOS: 1 days | Discharge: PSYCHIATRIC OR SUBSTANCE USE FACILITY | End: 2022-05-04
Payer: MEDICARE

## 2022-05-03 VITALS — BODY MASS INDEX: 41.84 KG/M2 | HEIGHT: 66 IN | WEIGHT: 260.37 LBS

## 2022-05-03 DIAGNOSIS — F20.9: Primary | ICD-10-CM

## 2022-05-03 DIAGNOSIS — Z88.1: ICD-10-CM

## 2022-05-03 DIAGNOSIS — I48.92: ICD-10-CM

## 2022-05-03 DIAGNOSIS — Z79.01: ICD-10-CM

## 2022-05-03 DIAGNOSIS — Z79.899: ICD-10-CM

## 2022-05-03 LAB
ALBUMIN SERPL BCG-MCNC: 3.4 GM/DL (ref 3.2–5.2)
ALT SERPL W P-5'-P-CCNC: 23 U/L (ref 12–78)
AMPHETAMINES UR QL SCN: NEGATIVE
APAP SERPL-MCNC: 10.8 UG/ML (ref 10–30)
BARBITURATES UR QL SCN: NEGATIVE
BENZODIAZ UR QL SCN: NEGATIVE
BILIRUB CONJ SERPL-MCNC: 0.2 MG/DL (ref 0–0.2)
BILIRUB SERPL-MCNC: 0.7 MG/DL (ref 0.2–1)
BUN SERPL-MCNC: 17 MG/DL (ref 7–18)
BZE UR QL SCN: NEGATIVE
CALCIUM SERPL-MCNC: 8.9 MG/DL (ref 8.8–10.2)
CANNABINOIDS UR QL SCN: NEGATIVE
CHLORIDE SERPL-SCNC: 103 MEQ/L (ref 98–107)
CO2 SERPL-SCNC: 27 MEQ/L (ref 21–32)
CREAT SERPL-MCNC: 0.69 MG/DL (ref 0.55–1.3)
ETHANOL SERPL-MCNC: < 0.003 % (ref 0–0.01)
GFR SERPL CREATININE-BSD FRML MDRD: > 60 ML/MIN/{1.73_M2} (ref 45–?)
GLUCOSE SERPL-MCNC: 209 MG/DL (ref 70–100)
HCT VFR BLD AUTO: 40 % (ref 36–47)
HGB BLD-MCNC: 13.1 G/DL (ref 12–15.5)
MCH RBC QN AUTO: 29.2 PG (ref 27–33)
MCHC RBC AUTO-ENTMCNC: 32.8 G/DL (ref 32–36.5)
MCV RBC AUTO: 89.1 FL (ref 80–96)
METHADONE UR QL SCN: NEGATIVE
OPIATES UR QL SCN: NEGATIVE
PCP UR QL SCN: NEGATIVE
PLATELET # BLD AUTO: 214 10^3/UL (ref 150–450)
POTASSIUM SERPL-SCNC: 4.3 MEQ/L (ref 3.5–5.1)
PROT SERPL-MCNC: 7.1 GM/DL (ref 6.4–8.2)
RBC # BLD AUTO: 4.49 10^6/UL (ref 4–5.4)
RSV RNA NPH QL NAA+PROBE: NEGATIVE
SALICYLATES SERPL-MCNC: < 1.7 MG/DL (ref 5–30)
SODIUM SERPL-SCNC: 138 MEQ/L (ref 136–145)
T4 FREE SERPL-MCNC: 0.88 NG/DL (ref 0.76–1.46)
TSH SERPL DL<=0.005 MIU/L-ACNC: 0.17 UIU/ML (ref 0.36–3.74)
WBC # BLD AUTO: 7.5 10^3/UL (ref 4–10)

## 2022-05-04 VITALS — DIASTOLIC BLOOD PRESSURE: 82 MMHG | SYSTOLIC BLOOD PRESSURE: 135 MMHG

## 2022-06-01 ENCOUNTER — HOSPITAL ENCOUNTER (EMERGENCY)
Dept: HOSPITAL 53 - M ED | Age: 67
LOS: 1 days | Discharge: HOME | End: 2022-06-02
Payer: MEDICARE

## 2022-06-01 DIAGNOSIS — Z88.1: ICD-10-CM

## 2022-06-01 DIAGNOSIS — Z79.82: ICD-10-CM

## 2022-06-01 DIAGNOSIS — Z79.84: ICD-10-CM

## 2022-06-01 DIAGNOSIS — F20.9: ICD-10-CM

## 2022-06-01 DIAGNOSIS — Z79.899: ICD-10-CM

## 2022-06-01 DIAGNOSIS — R42: Primary | ICD-10-CM

## 2022-06-02 VITALS — SYSTOLIC BLOOD PRESSURE: 120 MMHG | DIASTOLIC BLOOD PRESSURE: 71 MMHG

## 2023-07-10 ENCOUNTER — HOSPITAL ENCOUNTER (INPATIENT)
Dept: HOSPITAL 53 - M ED | Age: 68
LOS: 2 days | Discharge: PSYCHIATRIC OR SUBSTANCE USE FACILITY | DRG: 309 | End: 2023-07-12
Attending: GENERAL PRACTICE | Admitting: INTERNAL MEDICINE
Payer: MEDICARE

## 2023-07-10 VITALS — DIASTOLIC BLOOD PRESSURE: 84 MMHG | SYSTOLIC BLOOD PRESSURE: 142 MMHG | OXYGEN SATURATION: 93 % | TEMPERATURE: 97.8 F

## 2023-07-10 VITALS — WEIGHT: 272.49 LBS | BODY MASS INDEX: 43.79 KG/M2 | HEIGHT: 66 IN

## 2023-07-10 VITALS — SYSTOLIC BLOOD PRESSURE: 151 MMHG | DIASTOLIC BLOOD PRESSURE: 89 MMHG | OXYGEN SATURATION: 93 % | TEMPERATURE: 97.3 F

## 2023-07-10 VITALS — TEMPERATURE: 98.5 F

## 2023-07-10 DIAGNOSIS — M19.90: ICD-10-CM

## 2023-07-10 DIAGNOSIS — Z79.899: ICD-10-CM

## 2023-07-10 DIAGNOSIS — Z91.190: ICD-10-CM

## 2023-07-10 DIAGNOSIS — Z20.822: ICD-10-CM

## 2023-07-10 DIAGNOSIS — R45.851: ICD-10-CM

## 2023-07-10 DIAGNOSIS — Z88.1: ICD-10-CM

## 2023-07-10 DIAGNOSIS — F32.A: ICD-10-CM

## 2023-07-10 DIAGNOSIS — M54.9: ICD-10-CM

## 2023-07-10 DIAGNOSIS — F41.9: ICD-10-CM

## 2023-07-10 DIAGNOSIS — E66.9: ICD-10-CM

## 2023-07-10 DIAGNOSIS — I48.0: Primary | ICD-10-CM

## 2023-07-10 DIAGNOSIS — G89.29: ICD-10-CM

## 2023-07-10 DIAGNOSIS — F20.9: ICD-10-CM

## 2023-07-10 LAB
ALBUMIN SERPL BCG-MCNC: 3.5 G/DL (ref 3.2–5.2)
ALP SERPL-CCNC: 59 U/L (ref 46–116)
ALT SERPL W P-5'-P-CCNC: 20 U/L (ref 7–40)
APTT BLD: 26.2 SECONDS (ref 24.8–34.2)
AST SERPL-CCNC: < 8 U/L (ref ?–34)
BASOPHILS # BLD AUTO: 0 10^3/UL (ref 0–0.2)
BASOPHILS NFR BLD AUTO: 0.5 % (ref 0–1)
BILIRUB CONJ SERPL-MCNC: 0.5 MG/DL (ref ?–0.4)
BILIRUB SERPL-MCNC: 1.5 MG/DL (ref 0.3–1.2)
BUN SERPL-MCNC: 15 MG/DL (ref 9–23)
CALCIUM SERPL-MCNC: 8.3 MG/DL (ref 8.3–10.6)
CHLORIDE SERPL-SCNC: 102 MMOL/L (ref 98–107)
CK MB CFR.DF SERPL CALC: 3.03
CK MB CFR.DF SERPL CALC: 3.7
CK MB SERPL-MCNC: < 1 NG/ML (ref ?–3.6)
CK MB SERPL-MCNC: < 1 NG/ML (ref ?–3.6)
CK SERPL-CCNC: 27 U/L (ref 34–145)
CK SERPL-CCNC: 33 U/L (ref 34–145)
CO2 SERPL-SCNC: 27 MMOL/L (ref 20–31)
CREAT SERPL-MCNC: 0.61 MG/DL (ref 0.55–1.3)
EOSINOPHIL # BLD AUTO: 0.1 10^3/UL (ref 0–0.5)
EOSINOPHIL NFR BLD AUTO: 1.2 % (ref 0–3)
EST. AVERAGE GLUCOSE BLD GHB EST-MCNC: 186 MG/DL (ref 60–110)
GFR SERPL CREATININE-BSD FRML MDRD: > 60 ML/MIN/{1.73_M2} (ref 45–?)
GLUCOSE SERPL-MCNC: 134 MG/DL (ref 74–106)
HCT VFR BLD AUTO: 42 % (ref 36–47)
HGB BLD-MCNC: 13.4 G/DL (ref 12–15.5)
INR PPP: 1.01
LIPASE SERPL-CCNC: 29 U/L (ref 12–53)
LYMPHOCYTES # BLD AUTO: 1.6 10^3/UL (ref 1.5–5)
LYMPHOCYTES NFR BLD AUTO: 24.5 % (ref 24–44)
MCH RBC QN AUTO: 30 PG (ref 27–33)
MCHC RBC AUTO-ENTMCNC: 31.9 G/DL (ref 32–36.5)
MCV RBC AUTO: 94 FL (ref 80–96)
MONOCYTES # BLD AUTO: 0.4 10^3/UL (ref 0–0.8)
MONOCYTES NFR BLD AUTO: 6.7 % (ref 2–8)
NEUTROPHILS # BLD AUTO: 4.3 10^3/UL (ref 1.5–8.5)
NEUTROPHILS NFR BLD AUTO: 66.9 % (ref 36–66)
PLATELET # BLD AUTO: 200 10^3/UL (ref 150–450)
POTASSIUM SERPL-SCNC: 4.7 MMOL/L (ref 3.5–5.1)
PROT SERPL-MCNC: 6.7 G/DL (ref 5.7–8.2)
PROTHROMBIN TIME: 13.5 SECONDS (ref 12.5–14.5)
RBC # BLD AUTO: 4.47 10^6/UL (ref 4–5.4)
RSV RNA NPH QL NAA+PROBE: NEGATIVE
SODIUM SERPL-SCNC: 137 MMOL/L (ref 136–145)
TSH SERPL DL<=0.005 MIU/L-ACNC: 0.58 UIU/ML (ref 0.55–4.78)
WBC # BLD AUTO: 6.5 10^3/UL (ref 4–10)

## 2023-07-10 RX ADMIN — DOCUSATE SODIUM SCH MG: 100 CAPSULE, LIQUID FILLED ORAL at 21:00

## 2023-07-10 RX ADMIN — METOPROLOL TARTRATE SCH MG: 5 INJECTION INTRAVENOUS at 11:18

## 2023-07-10 RX ADMIN — METOPROLOL TARTRATE SCH MG: 5 INJECTION INTRAVENOUS at 11:31

## 2023-07-10 RX ADMIN — APIXABAN SCH MG: 5 TABLET, FILM COATED ORAL at 20:00

## 2023-07-10 RX ADMIN — METOPROLOL TARTRATE SCH MG: 50 TABLET, FILM COATED ORAL at 18:01

## 2023-07-10 RX ADMIN — METOPROLOL TARTRATE SCH MG: 5 INJECTION INTRAVENOUS at 11:38

## 2023-07-11 VITALS — SYSTOLIC BLOOD PRESSURE: 122 MMHG | TEMPERATURE: 97.3 F | DIASTOLIC BLOOD PRESSURE: 73 MMHG | OXYGEN SATURATION: 92 %

## 2023-07-11 VITALS — TEMPERATURE: 96.9 F | OXYGEN SATURATION: 92 % | DIASTOLIC BLOOD PRESSURE: 63 MMHG | SYSTOLIC BLOOD PRESSURE: 108 MMHG

## 2023-07-11 VITALS — TEMPERATURE: 97 F | DIASTOLIC BLOOD PRESSURE: 83 MMHG | OXYGEN SATURATION: 92 % | SYSTOLIC BLOOD PRESSURE: 134 MMHG

## 2023-07-11 VITALS — SYSTOLIC BLOOD PRESSURE: 133 MMHG | OXYGEN SATURATION: 94 % | TEMPERATURE: 97.7 F | DIASTOLIC BLOOD PRESSURE: 89 MMHG

## 2023-07-11 VITALS — OXYGEN SATURATION: 93 % | SYSTOLIC BLOOD PRESSURE: 137 MMHG | DIASTOLIC BLOOD PRESSURE: 68 MMHG | TEMPERATURE: 97.6 F

## 2023-07-11 VITALS — TEMPERATURE: 96.7 F | DIASTOLIC BLOOD PRESSURE: 62 MMHG | OXYGEN SATURATION: 94 % | SYSTOLIC BLOOD PRESSURE: 126 MMHG

## 2023-07-11 LAB
BASOPHILS # BLD AUTO: 0 10^3/UL (ref 0–0.2)
BASOPHILS NFR BLD AUTO: 0.4 % (ref 0–1)
BUN SERPL-MCNC: 18 MG/DL (ref 9–23)
CALCIUM SERPL-MCNC: 8.5 MG/DL (ref 8.3–10.6)
CHLORIDE SERPL-SCNC: 100 MMOL/L (ref 98–107)
CO2 SERPL-SCNC: 27 MMOL/L (ref 20–31)
CREAT SERPL-MCNC: 0.75 MG/DL (ref 0.55–1.3)
EOSINOPHIL # BLD AUTO: 0.1 10^3/UL (ref 0–0.5)
EOSINOPHIL NFR BLD AUTO: 1.3 % (ref 0–3)
GFR SERPL CREATININE-BSD FRML MDRD: > 60 ML/MIN/{1.73_M2} (ref 45–?)
GLUCOSE SERPL-MCNC: 116 MG/DL (ref 74–106)
HCT VFR BLD AUTO: 40.8 % (ref 36–47)
HGB BLD-MCNC: 13.2 G/DL (ref 12–15.5)
LYMPHOCYTES # BLD AUTO: 2.2 10^3/UL (ref 1.5–5)
LYMPHOCYTES NFR BLD AUTO: 28.8 % (ref 24–44)
MAGNESIUM SERPL-MCNC: 1.9 MG/DL (ref 1.8–2.4)
MCH RBC QN AUTO: 30.1 PG (ref 27–33)
MCHC RBC AUTO-ENTMCNC: 32.4 G/DL (ref 32–36.5)
MCV RBC AUTO: 92.9 FL (ref 80–96)
MONOCYTES # BLD AUTO: 0.5 10^3/UL (ref 0–0.8)
MONOCYTES NFR BLD AUTO: 6.9 % (ref 2–8)
NEUTROPHILS # BLD AUTO: 4.7 10^3/UL (ref 1.5–8.5)
NEUTROPHILS NFR BLD AUTO: 62.2 % (ref 36–66)
PLATELET # BLD AUTO: 188 10^3/UL (ref 150–450)
POTASSIUM SERPL-SCNC: 4.3 MMOL/L (ref 3.5–5.1)
RBC # BLD AUTO: 4.39 10^6/UL (ref 4–5.4)
SODIUM SERPL-SCNC: 137 MMOL/L (ref 136–145)
WBC # BLD AUTO: 7.5 10^3/UL (ref 4–10)

## 2023-07-11 RX ADMIN — METOPROLOL TARTRATE SCH MG: 50 TABLET, FILM COATED ORAL at 01:09

## 2023-07-11 RX ADMIN — ACETAMINOPHEN PRN MG: 325 TABLET ORAL at 14:10

## 2023-07-11 RX ADMIN — METOPROLOL TARTRATE SCH MG: 50 TABLET, FILM COATED ORAL at 12:46

## 2023-07-11 RX ADMIN — APIXABAN SCH MG: 5 TABLET, FILM COATED ORAL at 20:33

## 2023-07-11 RX ADMIN — DOCUSATE SODIUM SCH MG: 100 CAPSULE, LIQUID FILLED ORAL at 09:05

## 2023-07-11 RX ADMIN — APIXABAN SCH MG: 5 TABLET, FILM COATED ORAL at 09:06

## 2023-07-11 RX ADMIN — ROSUVASTATIN CALCIUM SCH MG: 10 TABLET, FILM COATED ORAL at 09:06

## 2023-07-11 RX ADMIN — METOPROLOL TARTRATE SCH MG: 50 TABLET, FILM COATED ORAL at 06:34

## 2023-07-11 RX ADMIN — METOPROLOL TARTRATE SCH MG: 50 TABLET, FILM COATED ORAL at 18:29

## 2023-07-11 RX ADMIN — ACETAMINOPHEN PRN MG: 325 TABLET ORAL at 02:34

## 2023-07-11 RX ADMIN — DOCUSATE SODIUM SCH MG: 100 CAPSULE, LIQUID FILLED ORAL at 20:33

## 2023-07-12 VITALS — SYSTOLIC BLOOD PRESSURE: 134 MMHG | DIASTOLIC BLOOD PRESSURE: 82 MMHG

## 2023-07-12 VITALS — DIASTOLIC BLOOD PRESSURE: 84 MMHG | OXYGEN SATURATION: 94 % | TEMPERATURE: 97.9 F | SYSTOLIC BLOOD PRESSURE: 133 MMHG

## 2023-07-12 LAB
BASOPHILS # BLD AUTO: 0 10^3/UL (ref 0–0.2)
BASOPHILS NFR BLD AUTO: 0.3 % (ref 0–1)
BUN SERPL-MCNC: 20 MG/DL (ref 9–23)
CALCIUM SERPL-MCNC: 7.9 MG/DL (ref 8.3–10.6)
CHLORIDE SERPL-SCNC: 103 MMOL/L (ref 98–107)
CO2 SERPL-SCNC: 29 MMOL/L (ref 20–31)
CREAT SERPL-MCNC: 0.68 MG/DL (ref 0.55–1.3)
EOSINOPHIL # BLD AUTO: 0.1 10^3/UL (ref 0–0.5)
EOSINOPHIL NFR BLD AUTO: 1.7 % (ref 0–3)
GFR SERPL CREATININE-BSD FRML MDRD: > 60 ML/MIN/{1.73_M2} (ref 45–?)
GLUCOSE SERPL-MCNC: 127 MG/DL (ref 74–106)
HCT VFR BLD AUTO: 40.4 % (ref 36–47)
HGB BLD-MCNC: 12.7 G/DL (ref 12–15.5)
LYMPHOCYTES # BLD AUTO: 1.5 10^3/UL (ref 1.5–5)
LYMPHOCYTES NFR BLD AUTO: 25 % (ref 24–44)
MAGNESIUM SERPL-MCNC: 2 MG/DL (ref 1.8–2.4)
MCH RBC QN AUTO: 29.2 PG (ref 27–33)
MCHC RBC AUTO-ENTMCNC: 31.4 G/DL (ref 32–36.5)
MCV RBC AUTO: 92.9 FL (ref 80–96)
MONOCYTES # BLD AUTO: 0.6 10^3/UL (ref 0–0.8)
MONOCYTES NFR BLD AUTO: 9.7 % (ref 2–8)
NEUTROPHILS # BLD AUTO: 3.7 10^3/UL (ref 1.5–8.5)
NEUTROPHILS NFR BLD AUTO: 63.1 % (ref 36–66)
PLATELET # BLD AUTO: 170 10^3/UL (ref 150–450)
POTASSIUM SERPL-SCNC: 4.3 MMOL/L (ref 3.5–5.1)
RBC # BLD AUTO: 4.35 10^6/UL (ref 4–5.4)
SODIUM SERPL-SCNC: 138 MMOL/L (ref 136–145)
WBC # BLD AUTO: 5.8 10^3/UL (ref 4–10)

## 2023-07-12 RX ADMIN — APIXABAN SCH MG: 5 TABLET, FILM COATED ORAL at 09:07

## 2023-07-12 RX ADMIN — ROSUVASTATIN CALCIUM SCH MG: 10 TABLET, FILM COATED ORAL at 09:07

## 2023-07-12 RX ADMIN — METOPROLOL TARTRATE SCH MG: 50 TABLET, FILM COATED ORAL at 00:01

## 2023-07-12 RX ADMIN — ACETAMINOPHEN PRN MG: 325 TABLET ORAL at 05:58

## 2023-07-12 RX ADMIN — DOCUSATE SODIUM SCH MG: 100 CAPSULE, LIQUID FILLED ORAL at 09:07

## 2023-07-12 RX ADMIN — METOPROLOL TARTRATE SCH MG: 50 TABLET, FILM COATED ORAL at 13:17

## 2023-07-12 RX ADMIN — METOPROLOL TARTRATE SCH MG: 50 TABLET, FILM COATED ORAL at 05:56

## 2023-08-05 ENCOUNTER — HOSPITAL ENCOUNTER (OUTPATIENT)
Dept: HOSPITAL 53 - M LAB REF | Age: 68
End: 2023-08-05
Attending: PHYSICIAN ASSISTANT
Payer: MEDICARE

## 2023-08-05 DIAGNOSIS — B34.9: Primary | ICD-10-CM

## 2023-08-06 ENCOUNTER — HOSPITAL ENCOUNTER (INPATIENT)
Dept: HOSPITAL 53 - M ED | Age: 68
LOS: 6 days | Discharge: HOME | DRG: 177 | End: 2023-08-12
Attending: GENERAL PRACTICE | Admitting: INTERNAL MEDICINE
Payer: MEDICARE

## 2023-08-06 VITALS — WEIGHT: 279.33 LBS | BODY MASS INDEX: 44.89 KG/M2 | HEIGHT: 66 IN

## 2023-08-06 DIAGNOSIS — J12.82: ICD-10-CM

## 2023-08-06 DIAGNOSIS — Z88.1: ICD-10-CM

## 2023-08-06 DIAGNOSIS — R73.9: ICD-10-CM

## 2023-08-06 DIAGNOSIS — K42.9: ICD-10-CM

## 2023-08-06 DIAGNOSIS — I48.91: ICD-10-CM

## 2023-08-06 DIAGNOSIS — Z79.01: ICD-10-CM

## 2023-08-06 DIAGNOSIS — F20.9: ICD-10-CM

## 2023-08-06 DIAGNOSIS — J96.01: ICD-10-CM

## 2023-08-06 DIAGNOSIS — E66.01: ICD-10-CM

## 2023-08-06 DIAGNOSIS — Z87.891: ICD-10-CM

## 2023-08-06 DIAGNOSIS — U07.1: Primary | ICD-10-CM

## 2023-08-06 DIAGNOSIS — N95.0: ICD-10-CM

## 2023-08-06 DIAGNOSIS — Z79.899: ICD-10-CM

## 2023-08-06 DIAGNOSIS — R31.9: ICD-10-CM

## 2023-08-06 DIAGNOSIS — E87.1: ICD-10-CM

## 2023-08-06 LAB
ALBUMIN SERPL BCG-MCNC: 3.2 G/DL (ref 3.2–5.2)
ALP SERPL-CCNC: 71 U/L (ref 46–116)
ALT SERPL W P-5'-P-CCNC: 22 U/L (ref 7–40)
APTT BLD: 26.9 SECONDS (ref 24.8–34.2)
AST SERPL-CCNC: 16 U/L (ref ?–34)
BASE EXCESS BLDA CALC-SCNC: 1.8 MMOL/L (ref -2–2)
BASOPHILS # BLD AUTO: 0 10^3/UL (ref 0–0.2)
BASOPHILS NFR BLD AUTO: 0.1 % (ref 0–1)
BILIRUB CONJ SERPL-MCNC: 0.7 MG/DL (ref ?–0.4)
BILIRUB SERPL-MCNC: 1.5 MG/DL (ref 0.3–1.2)
BUN SERPL-MCNC: 15 MG/DL (ref 9–23)
CALCIUM SERPL-MCNC: 8.1 MG/DL (ref 8.3–10.6)
CHLORIDE SERPL-SCNC: 102 MMOL/L (ref 98–107)
CK MB CFR.DF SERPL CALC: 2.17
CK MB CFR.DF SERPL CALC: 2.22
CK MB SERPL-MCNC: < 1 NG/ML (ref ?–3.6)
CK MB SERPL-MCNC: < 1 NG/ML (ref ?–3.6)
CK SERPL-CCNC: 45 U/L (ref 34–145)
CK SERPL-CCNC: 46 U/L (ref 34–145)
CO2 BLDA CALC-SCNC: 27.3 MMOL/L (ref 23–31)
CO2 SERPL-SCNC: 28 MMOL/L (ref 20–31)
CREAT SERPL-MCNC: 0.6 MG/DL (ref 0.55–1.3)
EOSINOPHIL # BLD AUTO: 0 10^3/UL (ref 0–0.5)
EOSINOPHIL NFR BLD AUTO: 0.4 % (ref 0–3)
GFR SERPL CREATININE-BSD FRML MDRD: > 60 ML/MIN/{1.73_M2} (ref 45–?)
GLUCOSE SERPL-MCNC: 118 MG/DL (ref 74–106)
HCO3 BLDA-SCNC: 26.1 MMOL/L (ref 22–26)
HCO3 STD BLDA-SCNC: 26 MMOL/L. (ref 22–26)
HCT VFR BLD AUTO: 37 % (ref 36–47)
HGB BLD-MCNC: 11.9 G/DL (ref 12–15.5)
INR PPP: 1.07
LYMPHOCYTES # BLD AUTO: 0.7 10^3/UL (ref 1.5–5)
LYMPHOCYTES NFR BLD AUTO: 9.9 % (ref 24–44)
MCH RBC QN AUTO: 29.8 PG (ref 27–33)
MCHC RBC AUTO-ENTMCNC: 32.2 G/DL (ref 32–36.5)
MCV RBC AUTO: 92.7 FL (ref 80–96)
MONOCYTES # BLD AUTO: 0.6 10^3/UL (ref 0–0.8)
MONOCYTES NFR BLD AUTO: 9 % (ref 2–8)
NEUTROPHILS # BLD AUTO: 5.4 10^3/UL (ref 1.5–8.5)
NEUTROPHILS NFR BLD AUTO: 80.3 % (ref 36–66)
PCO2 BLDA: 39.7 MMHG (ref 35–45)
PH BLDA: 7.44 UNITS (ref 7.35–7.45)
PLATELET # BLD AUTO: 188 10^3/UL (ref 150–450)
PO2 BLDA: 70.5 MMHG (ref 75–100)
POTASSIUM SERPL-SCNC: 4.2 MMOL/L (ref 3.5–5.1)
PROT SERPL-MCNC: 6.6 G/DL (ref 5.7–8.2)
PROTHROMBIN TIME: 14.1 SECONDS (ref 12.5–14.5)
RBC # BLD AUTO: 3.99 10^6/UL (ref 4–5.4)
SAO2 % BLDA: 94.1 % (ref 95–99)
SODIUM SERPL-SCNC: 136 MMOL/L (ref 136–145)
T4 FREE SERPL-MCNC: 0.98 NG/DL (ref 0.89–1.76)
TSH SERPL DL<=0.005 MIU/L-ACNC: 0.39 UIU/ML (ref 0.55–4.78)
WBC # BLD AUTO: 6.8 10^3/UL (ref 4–10)

## 2023-08-06 RX ADMIN — METOPROLOL TARTRATE SCH MG: 5 INJECTION INTRAVENOUS at 19:56

## 2023-08-06 RX ADMIN — METOPROLOL TARTRATE SCH MG: 5 INJECTION INTRAVENOUS at 19:20

## 2023-08-06 RX ADMIN — METOPROLOL TARTRATE SCH MG: 5 INJECTION INTRAVENOUS at 18:52

## 2023-08-07 VITALS — SYSTOLIC BLOOD PRESSURE: 114 MMHG | TEMPERATURE: 96.8 F | OXYGEN SATURATION: 95 % | DIASTOLIC BLOOD PRESSURE: 75 MMHG

## 2023-08-07 VITALS — DIASTOLIC BLOOD PRESSURE: 77 MMHG | OXYGEN SATURATION: 94 % | SYSTOLIC BLOOD PRESSURE: 113 MMHG | TEMPERATURE: 97.1 F

## 2023-08-07 VITALS — DIASTOLIC BLOOD PRESSURE: 74 MMHG | SYSTOLIC BLOOD PRESSURE: 132 MMHG | OXYGEN SATURATION: 93 % | TEMPERATURE: 97.2 F

## 2023-08-07 VITALS — OXYGEN SATURATION: 93 %

## 2023-08-07 VITALS — OXYGEN SATURATION: 92 % | TEMPERATURE: 97.3 F | SYSTOLIC BLOOD PRESSURE: 125 MMHG | DIASTOLIC BLOOD PRESSURE: 70 MMHG

## 2023-08-07 VITALS — OXYGEN SATURATION: 95 %

## 2023-08-07 VITALS — OXYGEN SATURATION: 94 %

## 2023-08-07 VITALS — SYSTOLIC BLOOD PRESSURE: 122 MMHG | OXYGEN SATURATION: 94 % | DIASTOLIC BLOOD PRESSURE: 58 MMHG | TEMPERATURE: 97.6 F

## 2023-08-07 VITALS — TEMPERATURE: 96.9 F | SYSTOLIC BLOOD PRESSURE: 108 MMHG | OXYGEN SATURATION: 98 % | DIASTOLIC BLOOD PRESSURE: 74 MMHG

## 2023-08-07 VITALS — OXYGEN SATURATION: 96 %

## 2023-08-07 VITALS — OXYGEN SATURATION: 97 %

## 2023-08-07 VITALS — OXYGEN SATURATION: 98 %

## 2023-08-07 VITALS — OXYGEN SATURATION: 92 %

## 2023-08-07 LAB
ALBUMIN SERPL BCG-MCNC: 3.1 G/DL (ref 3.2–5.2)
ALP SERPL-CCNC: 64 U/L (ref 46–116)
ALT SERPL W P-5'-P-CCNC: 22 U/L (ref 7–40)
AST SERPL-CCNC: 12 U/L (ref ?–34)
BASOPHILS # BLD AUTO: 0 10^3/UL (ref 0–0.2)
BASOPHILS NFR BLD AUTO: 0.2 % (ref 0–1)
BILIRUB SERPL-MCNC: 1.5 MG/DL (ref 0.3–1.2)
BUN SERPL-MCNC: 11 MG/DL (ref 9–23)
CALCIUM SERPL-MCNC: 8.4 MG/DL (ref 8.3–10.6)
CHLORIDE SERPL-SCNC: 100 MMOL/L (ref 98–107)
CO2 SERPL-SCNC: 26 MMOL/L (ref 20–31)
CREAT SERPL-MCNC: 0.55 MG/DL (ref 0.55–1.3)
CRP SERPL-MCNC: 5.8 MG/DL (ref ?–1)
D DIMER PPP DDU-MCNC: 1013.45 NG/ML (ref ?–500)
EOSINOPHIL # BLD AUTO: 0 10^3/UL (ref 0–0.5)
EOSINOPHIL NFR BLD AUTO: 0 % (ref 0–3)
FERRITIN SERPL-MCNC: 89.8 NG/ML (ref 7.3–270.7)
FIBRINOGEN PPP-MCNC: 365 MG/DL (ref 268–480)
GFR SERPL CREATININE-BSD FRML MDRD: > 60 ML/MIN/{1.73_M2} (ref 45–?)
GLUCOSE SERPL-MCNC: 165 MG/DL (ref 74–106)
HCT VFR BLD AUTO: 37.1 % (ref 36–47)
HGB BLD-MCNC: 11.7 G/DL (ref 12–15.5)
LDH SERPL L TO P-CCNC: 159 U/L (ref 120–246)
LYMPHOCYTES # BLD AUTO: 0.4 10^3/UL (ref 1.5–5)
LYMPHOCYTES NFR BLD AUTO: 8.1 % (ref 24–44)
MAGNESIUM SERPL-MCNC: 1.8 MG/DL (ref 1.8–2.4)
MCH RBC QN AUTO: 29.5 PG (ref 27–33)
MCHC RBC AUTO-ENTMCNC: 31.5 G/DL (ref 32–36.5)
MCV RBC AUTO: 93.7 FL (ref 80–96)
MONOCYTES # BLD AUTO: 0.2 10^3/UL (ref 0–0.8)
MONOCYTES NFR BLD AUTO: 3.5 % (ref 2–8)
NEUTROPHILS # BLD AUTO: 4.2 10^3/UL (ref 1.5–8.5)
NEUTROPHILS NFR BLD AUTO: 87.8 % (ref 36–66)
PLATELET # BLD AUTO: 159 10^3/UL (ref 150–450)
POTASSIUM SERPL-SCNC: 4.1 MMOL/L (ref 3.5–5.1)
PROCALCITONIN SERPL-MCNC: 0.08 NG/ML
PROT SERPL-MCNC: 6.1 G/DL (ref 5.7–8.2)
RBC # BLD AUTO: 3.96 10^6/UL (ref 4–5.4)
SODIUM SERPL-SCNC: 136 MMOL/L (ref 136–145)
WBC # BLD AUTO: 4.8 10^3/UL (ref 4–10)

## 2023-08-07 RX ADMIN — ACETAMINOPHEN PRN MG: 325 TABLET ORAL at 20:42

## 2023-08-07 RX ADMIN — INSULIN LISPRO SCH UNITS: 100 INJECTION, SOLUTION INTRAVENOUS; SUBCUTANEOUS at 17:42

## 2023-08-07 RX ADMIN — INSULIN LISPRO SCH UNITS: 100 INJECTION, SOLUTION INTRAVENOUS; SUBCUTANEOUS at 13:50

## 2023-08-07 RX ADMIN — APIXABAN SCH MG: 5 TABLET, FILM COATED ORAL at 20:41

## 2023-08-07 RX ADMIN — INSULIN LISPRO SCH UNITS: 100 INJECTION, SOLUTION INTRAVENOUS; SUBCUTANEOUS at 20:55

## 2023-08-07 RX ADMIN — SODIUM CHLORIDE SCH MLS/HR: 9 INJECTION, SOLUTION INTRAVENOUS at 20:42

## 2023-08-07 RX ADMIN — METOPROLOL TARTRATE SCH MG: 50 TABLET, FILM COATED ORAL at 08:24

## 2023-08-07 RX ADMIN — IPRATROPIUM BROMIDE AND ALBUTEROL SULFATE SCH ML: .5; 3 SOLUTION RESPIRATORY (INHALATION) at 08:12

## 2023-08-07 RX ADMIN — IPRATROPIUM BROMIDE AND ALBUTEROL SULFATE SCH ML: .5; 3 SOLUTION RESPIRATORY (INHALATION) at 14:00

## 2023-08-07 RX ADMIN — DEXAMETHASONE SODIUM PHOSPHATE SCH MG: 4 INJECTION, SOLUTION INTRAMUSCULAR; INTRAVENOUS at 20:41

## 2023-08-07 RX ADMIN — METOPROLOL TARTRATE SCH MG: 50 TABLET, FILM COATED ORAL at 00:45

## 2023-08-07 RX ADMIN — IPRATROPIUM BROMIDE AND ALBUTEROL SULFATE SCH ML: .5; 3 SOLUTION RESPIRATORY (INHALATION) at 02:01

## 2023-08-07 RX ADMIN — DIGOXIN SCH MG: 0.25 INJECTION INTRAMUSCULAR; INTRAVENOUS at 00:44

## 2023-08-07 RX ADMIN — APIXABAN SCH MG: 5 TABLET, FILM COATED ORAL at 13:49

## 2023-08-07 RX ADMIN — METOPROLOL TARTRATE SCH MG: 100 TABLET, FILM COATED ORAL at 20:24

## 2023-08-07 RX ADMIN — DIGOXIN SCH MG: 0.25 INJECTION INTRAMUSCULAR; INTRAVENOUS at 05:38

## 2023-08-07 RX ADMIN — INSULIN LISPRO SCH UNITS: 100 INJECTION, SOLUTION INTRAVENOUS; SUBCUTANEOUS at 08:24

## 2023-08-07 RX ADMIN — IPRATROPIUM BROMIDE AND ALBUTEROL SULFATE SCH ML: .5; 3 SOLUTION RESPIRATORY (INHALATION) at 20:19

## 2023-08-07 RX ADMIN — DEXAMETHASONE SODIUM PHOSPHATE SCH MG: 4 INJECTION, SOLUTION INTRAMUSCULAR; INTRAVENOUS at 00:45

## 2023-08-08 VITALS — OXYGEN SATURATION: 96 % | TEMPERATURE: 96.3 F | SYSTOLIC BLOOD PRESSURE: 128 MMHG | DIASTOLIC BLOOD PRESSURE: 83 MMHG

## 2023-08-08 VITALS — OXYGEN SATURATION: 97 % | TEMPERATURE: 97.8 F

## 2023-08-08 VITALS — OXYGEN SATURATION: 92 %

## 2023-08-08 VITALS — OXYGEN SATURATION: 94 %

## 2023-08-08 VITALS — SYSTOLIC BLOOD PRESSURE: 134 MMHG | TEMPERATURE: 97.2 F | OXYGEN SATURATION: 96 % | DIASTOLIC BLOOD PRESSURE: 64 MMHG

## 2023-08-08 VITALS — DIASTOLIC BLOOD PRESSURE: 77 MMHG | SYSTOLIC BLOOD PRESSURE: 111 MMHG | TEMPERATURE: 97 F | OXYGEN SATURATION: 96 %

## 2023-08-08 VITALS — OXYGEN SATURATION: 97 %

## 2023-08-08 VITALS — TEMPERATURE: 97.1 F | DIASTOLIC BLOOD PRESSURE: 64 MMHG | SYSTOLIC BLOOD PRESSURE: 134 MMHG | OXYGEN SATURATION: 96 %

## 2023-08-08 VITALS — OXYGEN SATURATION: 96 %

## 2023-08-08 LAB
ALBUMIN SERPL BCG-MCNC: 3 G/DL (ref 3.2–5.2)
ALP SERPL-CCNC: 58 U/L (ref 46–116)
ALT SERPL W P-5'-P-CCNC: 17 U/L (ref 7–40)
AST SERPL-CCNC: < 8 U/L (ref ?–34)
BASOPHILS # BLD AUTO: 0 10^3/UL (ref 0–0.2)
BASOPHILS NFR BLD AUTO: 0 % (ref 0–1)
BILIRUB CONJ SERPL-MCNC: 0.4 MG/DL (ref ?–0.4)
BILIRUB SERPL-MCNC: 0.8 MG/DL (ref 0.3–1.2)
BUN SERPL-MCNC: 20 MG/DL (ref 9–23)
CALCIUM SERPL-MCNC: 8.2 MG/DL (ref 8.3–10.6)
CHLORIDE SERPL-SCNC: 103 MMOL/L (ref 98–107)
CO2 SERPL-SCNC: 27 MMOL/L (ref 20–31)
CREAT SERPL-MCNC: 0.61 MG/DL (ref 0.55–1.3)
EOSINOPHIL # BLD AUTO: 0 10^3/UL (ref 0–0.5)
EOSINOPHIL NFR BLD AUTO: 0 % (ref 0–3)
GFR SERPL CREATININE-BSD FRML MDRD: > 60 ML/MIN/{1.73_M2} (ref 45–?)
GLUCOSE SERPL-MCNC: 199 MG/DL (ref 74–106)
HCT VFR BLD AUTO: 37.1 % (ref 36–47)
HCT VFR BLD AUTO: 39.7 % (ref 36–47)
HGB BLD-MCNC: 11.3 G/DL (ref 12–15.5)
HGB BLD-MCNC: 12.1 G/DL (ref 12–15.5)
LYMPHOCYTES # BLD AUTO: 0.4 10^3/UL (ref 1.5–5)
LYMPHOCYTES NFR BLD AUTO: 9.5 % (ref 24–44)
MCH RBC QN AUTO: 29 PG (ref 27–33)
MCHC RBC AUTO-ENTMCNC: 30.5 G/DL (ref 32–36.5)
MCV RBC AUTO: 95.1 FL (ref 80–96)
MONOCYTES # BLD AUTO: 0.2 10^3/UL (ref 0–0.8)
MONOCYTES NFR BLD AUTO: 4.5 % (ref 2–8)
NEUTROPHILS # BLD AUTO: 3.4 10^3/UL (ref 1.5–8.5)
NEUTROPHILS NFR BLD AUTO: 85.3 % (ref 36–66)
PLATELET # BLD AUTO: 155 10^3/UL (ref 150–450)
POTASSIUM SERPL-SCNC: 4.5 MMOL/L (ref 3.5–5.1)
PROT SERPL-MCNC: 6 G/DL (ref 5.7–8.2)
RBC # BLD AUTO: 3.9 10^6/UL (ref 4–5.4)
SODIUM SERPL-SCNC: 139 MMOL/L (ref 136–145)
WBC # BLD AUTO: 4 10^3/UL (ref 4–10)

## 2023-08-08 RX ADMIN — IPRATROPIUM BROMIDE AND ALBUTEROL SULFATE SCH ML: .5; 3 SOLUTION RESPIRATORY (INHALATION) at 19:46

## 2023-08-08 RX ADMIN — APIXABAN SCH MG: 5 TABLET, FILM COATED ORAL at 07:52

## 2023-08-08 RX ADMIN — METOPROLOL TARTRATE SCH MG: 100 TABLET, FILM COATED ORAL at 07:53

## 2023-08-08 RX ADMIN — METOPROLOL TARTRATE SCH MG: 100 TABLET, FILM COATED ORAL at 21:12

## 2023-08-08 RX ADMIN — INSULIN LISPRO SCH UNITS: 100 INJECTION, SOLUTION INTRAVENOUS; SUBCUTANEOUS at 21:00

## 2023-08-08 RX ADMIN — SODIUM CHLORIDE SCH MLS/HR: 9 INJECTION, SOLUTION INTRAVENOUS at 21:12

## 2023-08-08 RX ADMIN — IPRATROPIUM BROMIDE AND ALBUTEROL SULFATE SCH ML: .5; 3 SOLUTION RESPIRATORY (INHALATION) at 01:40

## 2023-08-08 RX ADMIN — IPRATROPIUM BROMIDE AND ALBUTEROL SULFATE SCH ML: .5; 3 SOLUTION RESPIRATORY (INHALATION) at 14:54

## 2023-08-08 RX ADMIN — INSULIN LISPRO SCH UNITS: 100 INJECTION, SOLUTION INTRAVENOUS; SUBCUTANEOUS at 07:53

## 2023-08-08 RX ADMIN — INSULIN LISPRO SCH UNITS: 100 INJECTION, SOLUTION INTRAVENOUS; SUBCUTANEOUS at 18:22

## 2023-08-08 RX ADMIN — DEXAMETHASONE SODIUM PHOSPHATE SCH MG: 4 INJECTION, SOLUTION INTRAMUSCULAR; INTRAVENOUS at 21:04

## 2023-08-08 RX ADMIN — IPRATROPIUM BROMIDE AND ALBUTEROL SULFATE SCH ML: .5; 3 SOLUTION RESPIRATORY (INHALATION) at 08:20

## 2023-08-08 RX ADMIN — INSULIN LISPRO SCH UNITS: 100 INJECTION, SOLUTION INTRAVENOUS; SUBCUTANEOUS at 13:00

## 2023-08-09 VITALS — OXYGEN SATURATION: 98 % | TEMPERATURE: 97.1 F | DIASTOLIC BLOOD PRESSURE: 77 MMHG | SYSTOLIC BLOOD PRESSURE: 134 MMHG

## 2023-08-09 VITALS — TEMPERATURE: 96.4 F | DIASTOLIC BLOOD PRESSURE: 76 MMHG | OXYGEN SATURATION: 94 % | SYSTOLIC BLOOD PRESSURE: 125 MMHG

## 2023-08-09 VITALS — DIASTOLIC BLOOD PRESSURE: 85 MMHG | TEMPERATURE: 97.4 F | OXYGEN SATURATION: 96 % | SYSTOLIC BLOOD PRESSURE: 139 MMHG

## 2023-08-09 VITALS — TEMPERATURE: 96.5 F | SYSTOLIC BLOOD PRESSURE: 144 MMHG | DIASTOLIC BLOOD PRESSURE: 90 MMHG | OXYGEN SATURATION: 94 %

## 2023-08-09 VITALS — SYSTOLIC BLOOD PRESSURE: 161 MMHG | OXYGEN SATURATION: 93 % | DIASTOLIC BLOOD PRESSURE: 88 MMHG | TEMPERATURE: 98.4 F

## 2023-08-09 VITALS — OXYGEN SATURATION: 98 %

## 2023-08-09 LAB
BASOPHILS # BLD AUTO: 0 10^3/UL (ref 0–0.2)
BASOPHILS NFR BLD AUTO: 0.2 % (ref 0–1)
BUN SERPL-MCNC: 22 MG/DL (ref 9–23)
CALCIUM SERPL-MCNC: 8.1 MG/DL (ref 8.3–10.6)
CHLORIDE SERPL-SCNC: 98 MMOL/L (ref 98–107)
CO2 SERPL-SCNC: 28 MMOL/L (ref 20–31)
CREAT SERPL-MCNC: 0.53 MG/DL (ref 0.55–1.3)
CRP SERPL-MCNC: 2.7 MG/DL (ref ?–1)
EOSINOPHIL # BLD AUTO: 0 10^3/UL (ref 0–0.5)
EOSINOPHIL NFR BLD AUTO: 0 % (ref 0–3)
GFR SERPL CREATININE-BSD FRML MDRD: > 60 ML/MIN/{1.73_M2} (ref 45–?)
GLUCOSE SERPL-MCNC: 205 MG/DL (ref 74–106)
HCT VFR BLD AUTO: 36.4 % (ref 36–47)
HCT VFR BLD AUTO: 37 % (ref 36–47)
HGB BLD-MCNC: 11.5 G/DL (ref 12–15.5)
HGB BLD-MCNC: 11.5 G/DL (ref 12–15.5)
LYMPHOCYTES # BLD AUTO: 0.6 10^3/UL (ref 1.5–5)
LYMPHOCYTES NFR BLD AUTO: 8.9 % (ref 24–44)
MCH RBC QN AUTO: 29.3 PG (ref 27–33)
MCHC RBC AUTO-ENTMCNC: 31.1 G/DL (ref 32–36.5)
MCV RBC AUTO: 94.4 FL (ref 80–96)
MONOCYTES # BLD AUTO: 0.2 10^3/UL (ref 0–0.8)
MONOCYTES NFR BLD AUTO: 2.9 % (ref 2–8)
NEUTROPHILS # BLD AUTO: 5.4 10^3/UL (ref 1.5–8.5)
NEUTROPHILS NFR BLD AUTO: 87.5 % (ref 36–66)
PLATELET # BLD AUTO: 173 10^3/UL (ref 150–450)
POTASSIUM SERPL-SCNC: 4.6 MMOL/L (ref 3.5–5.1)
PROCALCITONIN SERPL-MCNC: 0.04 NG/ML
RBC # BLD AUTO: 3.92 10^6/UL (ref 4–5.4)
SODIUM SERPL-SCNC: 135 MMOL/L (ref 136–145)
WBC # BLD AUTO: 6.2 10^3/UL (ref 4–10)

## 2023-08-09 RX ADMIN — IPRATROPIUM BROMIDE AND ALBUTEROL SULFATE SCH ML: .5; 3 SOLUTION RESPIRATORY (INHALATION) at 07:15

## 2023-08-09 RX ADMIN — APIXABAN SCH MG: 5 TABLET, FILM COATED ORAL at 08:59

## 2023-08-09 RX ADMIN — SODIUM CHLORIDE SCH MLS/HR: 9 INJECTION, SOLUTION INTRAVENOUS at 23:25

## 2023-08-09 RX ADMIN — INSULIN LISPRO SCH UNITS: 100 INJECTION, SOLUTION INTRAVENOUS; SUBCUTANEOUS at 17:30

## 2023-08-09 RX ADMIN — INSULIN LISPRO SCH UNITS: 100 INJECTION, SOLUTION INTRAVENOUS; SUBCUTANEOUS at 08:58

## 2023-08-09 RX ADMIN — ACETAMINOPHEN PRN MG: 325 TABLET ORAL at 18:57

## 2023-08-09 RX ADMIN — ACETAMINOPHEN PRN MG: 325 TABLET ORAL at 01:03

## 2023-08-09 RX ADMIN — DEXTROSE MONOHYDRATE SCH MLS/HR: 50 INJECTION, SOLUTION INTRAVENOUS at 10:37

## 2023-08-09 RX ADMIN — METOPROLOL TARTRATE SCH MG: 100 TABLET, FILM COATED ORAL at 08:58

## 2023-08-09 RX ADMIN — IPRATROPIUM BROMIDE AND ALBUTEROL SULFATE SCH ML: .5; 3 SOLUTION RESPIRATORY (INHALATION) at 01:07

## 2023-08-09 RX ADMIN — INSULIN LISPRO SCH UNITS: 100 INJECTION, SOLUTION INTRAVENOUS; SUBCUTANEOUS at 12:58

## 2023-08-09 RX ADMIN — METOPROLOL TARTRATE SCH MG: 100 TABLET, FILM COATED ORAL at 22:08

## 2023-08-09 RX ADMIN — DEXTROSE MONOHYDRATE SCH MLS/HR: 50 INJECTION, SOLUTION INTRAVENOUS at 20:25

## 2023-08-09 RX ADMIN — INSULIN LISPRO SCH UNITS: 100 INJECTION, SOLUTION INTRAVENOUS; SUBCUTANEOUS at 21:00

## 2023-08-09 RX ADMIN — APIXABAN SCH MG: 5 TABLET, FILM COATED ORAL at 21:00

## 2023-08-09 RX ADMIN — DEXTROSE MONOHYDRATE SCH MLS/HR: 50 INJECTION, SOLUTION INTRAVENOUS at 12:05

## 2023-08-09 RX ADMIN — SODIUM CHLORIDE SCH MLS/HR: 9 INJECTION, SOLUTION INTRAVENOUS at 22:06

## 2023-08-10 VITALS — DIASTOLIC BLOOD PRESSURE: 86 MMHG | OXYGEN SATURATION: 96 % | TEMPERATURE: 97.3 F | SYSTOLIC BLOOD PRESSURE: 161 MMHG

## 2023-08-10 VITALS — OXYGEN SATURATION: 97 % | SYSTOLIC BLOOD PRESSURE: 125 MMHG | DIASTOLIC BLOOD PRESSURE: 76 MMHG | TEMPERATURE: 97.1 F

## 2023-08-10 VITALS — OXYGEN SATURATION: 98 % | DIASTOLIC BLOOD PRESSURE: 82 MMHG | TEMPERATURE: 97.4 F | SYSTOLIC BLOOD PRESSURE: 132 MMHG

## 2023-08-10 VITALS — TEMPERATURE: 96.6 F | SYSTOLIC BLOOD PRESSURE: 139 MMHG | OXYGEN SATURATION: 97 % | DIASTOLIC BLOOD PRESSURE: 98 MMHG

## 2023-08-10 VITALS — TEMPERATURE: 97.5 F

## 2023-08-10 LAB
BASOPHILS # BLD AUTO: 0 10^3/UL (ref 0–0.2)
BASOPHILS NFR BLD AUTO: 0 % (ref 0–1)
BUN SERPL-MCNC: 23 MG/DL (ref 9–23)
CALCIUM SERPL-MCNC: 8.4 MG/DL (ref 8.3–10.6)
CHLORIDE SERPL-SCNC: 98 MMOL/L (ref 98–107)
CO2 SERPL-SCNC: 30 MMOL/L (ref 20–31)
CREAT SERPL-MCNC: 0.64 MG/DL (ref 0.55–1.3)
EOSINOPHIL # BLD AUTO: 0 10^3/UL (ref 0–0.5)
EOSINOPHIL NFR BLD AUTO: 0 % (ref 0–3)
GFR SERPL CREATININE-BSD FRML MDRD: > 60 ML/MIN/{1.73_M2} (ref 45–?)
GLUCOSE SERPL-MCNC: 211 MG/DL (ref 74–106)
HCT VFR BLD AUTO: 41.2 % (ref 36–47)
HGB BLD-MCNC: 12.9 G/DL (ref 12–15.5)
LYMPHOCYTES # BLD AUTO: 0.9 10^3/UL (ref 1.5–5)
LYMPHOCYTES NFR BLD AUTO: 16.5 % (ref 24–44)
MCH RBC QN AUTO: 29.3 PG (ref 27–33)
MCHC RBC AUTO-ENTMCNC: 31.3 G/DL (ref 32–36.5)
MCV RBC AUTO: 93.4 FL (ref 80–96)
MONOCYTES # BLD AUTO: 0.3 10^3/UL (ref 0–0.8)
MONOCYTES NFR BLD AUTO: 5.5 % (ref 2–8)
NEUTROPHILS # BLD AUTO: 4.2 10^3/UL (ref 1.5–8.5)
NEUTROPHILS NFR BLD AUTO: 77.5 % (ref 36–66)
PLATELET # BLD AUTO: 186 10^3/UL (ref 150–450)
POTASSIUM SERPL-SCNC: 4.8 MMOL/L (ref 3.5–5.1)
RBC # BLD AUTO: 4.41 10^6/UL (ref 4–5.4)
SODIUM SERPL-SCNC: 136 MMOL/L (ref 136–145)
WBC # BLD AUTO: 5.5 10^3/UL (ref 4–10)

## 2023-08-10 RX ADMIN — SODIUM CHLORIDE SCH MLS/HR: 9 INJECTION, SOLUTION INTRAVENOUS at 08:16

## 2023-08-10 RX ADMIN — INSULIN LISPRO SCH UNITS: 100 INJECTION, SOLUTION INTRAVENOUS; SUBCUTANEOUS at 08:14

## 2023-08-10 RX ADMIN — ACETAMINOPHEN PRN MG: 325 TABLET ORAL at 12:25

## 2023-08-10 RX ADMIN — METOPROLOL TARTRATE SCH MG: 100 TABLET, FILM COATED ORAL at 08:15

## 2023-08-10 RX ADMIN — SODIUM CHLORIDE SCH MLS/HR: 9 INJECTION, SOLUTION INTRAVENOUS at 22:01

## 2023-08-10 RX ADMIN — DEXTROSE MONOHYDRATE SCH MLS/HR: 50 INJECTION, SOLUTION INTRAVENOUS at 08:15

## 2023-08-10 RX ADMIN — METOPROLOL TARTRATE SCH MG: 100 TABLET, FILM COATED ORAL at 22:02

## 2023-08-10 RX ADMIN — INSULIN LISPRO SCH UNITS: 100 INJECTION, SOLUTION INTRAVENOUS; SUBCUTANEOUS at 12:24

## 2023-08-10 RX ADMIN — INSULIN LISPRO SCH UNITS: 100 INJECTION, SOLUTION INTRAVENOUS; SUBCUTANEOUS at 18:34

## 2023-08-10 RX ADMIN — APIXABAN SCH MG: 5 TABLET, FILM COATED ORAL at 08:15

## 2023-08-10 RX ADMIN — INSULIN LISPRO SCH UNITS: 100 INJECTION, SOLUTION INTRAVENOUS; SUBCUTANEOUS at 21:00

## 2023-08-11 VITALS — SYSTOLIC BLOOD PRESSURE: 149 MMHG | DIASTOLIC BLOOD PRESSURE: 88 MMHG | OXYGEN SATURATION: 95 % | TEMPERATURE: 96.5 F

## 2023-08-11 VITALS — SYSTOLIC BLOOD PRESSURE: 158 MMHG | DIASTOLIC BLOOD PRESSURE: 98 MMHG | TEMPERATURE: 97.7 F | OXYGEN SATURATION: 96 %

## 2023-08-11 VITALS — DIASTOLIC BLOOD PRESSURE: 92 MMHG | SYSTOLIC BLOOD PRESSURE: 158 MMHG

## 2023-08-11 VITALS — OXYGEN SATURATION: 97 % | TEMPERATURE: 97.9 F | SYSTOLIC BLOOD PRESSURE: 157 MMHG | DIASTOLIC BLOOD PRESSURE: 79 MMHG

## 2023-08-11 VITALS — TEMPERATURE: 98 F

## 2023-08-11 LAB
APTT BLD: 26.7 SECONDS (ref 24.8–34.2)
BASOPHILS # BLD AUTO: 0 10^3/UL (ref 0–0.2)
BASOPHILS NFR BLD AUTO: 0 % (ref 0–1)
BUN SERPL-MCNC: 24 MG/DL (ref 9–23)
CALCIUM SERPL-MCNC: 8.5 MG/DL (ref 8.3–10.6)
CANCER AG125 SERPL-ACNC: 22 U/ML (ref ?–35)
CANCER AG19-9 SERPL-ACNC: 8.7 U/ML (ref ?–35)
CEA SERPL-MCNC: < 2 NG/ML (ref ?–2.5)
CHLORIDE SERPL-SCNC: 97 MMOL/L (ref 98–107)
CO2 SERPL-SCNC: 28 MMOL/L (ref 20–31)
CREAT SERPL-MCNC: 0.53 MG/DL (ref 0.55–1.3)
EOSINOPHIL # BLD AUTO: 0 10^3/UL (ref 0–0.5)
EOSINOPHIL NFR BLD AUTO: 0 % (ref 0–3)
GFR SERPL CREATININE-BSD FRML MDRD: > 60 ML/MIN/{1.73_M2} (ref 45–?)
GLUCOSE SERPL-MCNC: 211 MG/DL (ref 74–106)
HCT VFR BLD AUTO: 41.9 % (ref 36–47)
HGB BLD-MCNC: 13.4 G/DL (ref 12–15.5)
INR PPP: 1.15
LYMPHOCYTES # BLD AUTO: 0.8 10^3/UL (ref 1.5–5)
LYMPHOCYTES NFR BLD AUTO: 14.6 % (ref 24–44)
MCH RBC QN AUTO: 29.1 PG (ref 27–33)
MCHC RBC AUTO-ENTMCNC: 32 G/DL (ref 32–36.5)
MCV RBC AUTO: 91.1 FL (ref 80–96)
MONOCYTES # BLD AUTO: 0.3 10^3/UL (ref 0–0.8)
MONOCYTES NFR BLD AUTO: 4.9 % (ref 2–8)
NEUTROPHILS # BLD AUTO: 4.4 10^3/UL (ref 1.5–8.5)
NEUTROPHILS NFR BLD AUTO: 80.1 % (ref 36–66)
PLATELET # BLD AUTO: 213 10^3/UL (ref 150–450)
POTASSIUM SERPL-SCNC: 4.5 MMOL/L (ref 3.5–5.1)
PROTHROMBIN TIME: 14.4 SECONDS (ref 12.5–14.5)
RBC # BLD AUTO: 4.6 10^6/UL (ref 4–5.4)
SODIUM SERPL-SCNC: 134 MMOL/L (ref 136–145)
WBC # BLD AUTO: 5.5 10^3/UL (ref 4–10)

## 2023-08-11 RX ADMIN — METOPROLOL TARTRATE SCH MG: 100 TABLET, FILM COATED ORAL at 21:23

## 2023-08-11 RX ADMIN — INSULIN LISPRO SCH UNITS: 100 INJECTION, SOLUTION INTRAVENOUS; SUBCUTANEOUS at 13:27

## 2023-08-11 RX ADMIN — INSULIN LISPRO SCH UNITS: 100 INJECTION, SOLUTION INTRAVENOUS; SUBCUTANEOUS at 17:30

## 2023-08-11 RX ADMIN — ACETAMINOPHEN PRN MG: 325 TABLET ORAL at 01:27

## 2023-08-11 RX ADMIN — ACETAMINOPHEN PRN MG: 325 TABLET ORAL at 06:38

## 2023-08-11 RX ADMIN — INSULIN LISPRO SCH UNITS: 100 INJECTION, SOLUTION INTRAVENOUS; SUBCUTANEOUS at 21:00

## 2023-08-11 RX ADMIN — METOPROLOL TARTRATE SCH MG: 100 TABLET, FILM COATED ORAL at 08:45

## 2023-08-11 RX ADMIN — INSULIN LISPRO SCH UNITS: 100 INJECTION, SOLUTION INTRAVENOUS; SUBCUTANEOUS at 08:45

## 2023-08-12 VITALS — DIASTOLIC BLOOD PRESSURE: 70 MMHG | OXYGEN SATURATION: 94 % | TEMPERATURE: 97.4 F | SYSTOLIC BLOOD PRESSURE: 152 MMHG

## 2023-08-12 VITALS — SYSTOLIC BLOOD PRESSURE: 136 MMHG | DIASTOLIC BLOOD PRESSURE: 84 MMHG

## 2023-08-12 VITALS — DIASTOLIC BLOOD PRESSURE: 89 MMHG | SYSTOLIC BLOOD PRESSURE: 155 MMHG | TEMPERATURE: 97.3 F | OXYGEN SATURATION: 93 %

## 2023-08-12 VITALS — TEMPERATURE: 97.5 F | DIASTOLIC BLOOD PRESSURE: 78 MMHG | OXYGEN SATURATION: 94 % | SYSTOLIC BLOOD PRESSURE: 157 MMHG

## 2023-08-12 LAB
BASOPHILS # BLD AUTO: 0 10^3/UL (ref 0–0.2)
BASOPHILS NFR BLD AUTO: 0 % (ref 0–1)
BUN SERPL-MCNC: 19 MG/DL (ref 9–23)
CALCIUM SERPL-MCNC: 8.7 MG/DL (ref 8.3–10.6)
CHLORIDE SERPL-SCNC: 95 MMOL/L (ref 98–107)
CO2 SERPL-SCNC: 32 MMOL/L (ref 20–31)
CREAT SERPL-MCNC: 0.6 MG/DL (ref 0.55–1.3)
EOSINOPHIL # BLD AUTO: 0 10^3/UL (ref 0–0.5)
EOSINOPHIL NFR BLD AUTO: 0 % (ref 0–3)
GFR SERPL CREATININE-BSD FRML MDRD: > 60 ML/MIN/{1.73_M2} (ref 45–?)
GLUCOSE SERPL-MCNC: 203 MG/DL (ref 74–106)
HCT VFR BLD AUTO: 43.9 % (ref 36–47)
HGB BLD-MCNC: 14 G/DL (ref 12–15.5)
LYMPHOCYTES # BLD AUTO: 0.9 10^3/UL (ref 1.5–5)
LYMPHOCYTES NFR BLD AUTO: 15 % (ref 24–44)
MCH RBC QN AUTO: 28.9 PG (ref 27–33)
MCHC RBC AUTO-ENTMCNC: 31.9 G/DL (ref 32–36.5)
MCV RBC AUTO: 90.7 FL (ref 80–96)
MONOCYTES # BLD AUTO: 0.3 10^3/UL (ref 0–0.8)
MONOCYTES NFR BLD AUTO: 4.9 % (ref 2–8)
NEUTROPHILS # BLD AUTO: 5 10^3/UL (ref 1.5–8.5)
NEUTROPHILS NFR BLD AUTO: 79.5 % (ref 36–66)
PLATELET # BLD AUTO: 207 10^3/UL (ref 150–450)
POTASSIUM SERPL-SCNC: 4.7 MMOL/L (ref 3.5–5.1)
RBC # BLD AUTO: 4.84 10^6/UL (ref 4–5.4)
SODIUM SERPL-SCNC: 135 MMOL/L (ref 136–145)
WBC # BLD AUTO: 6.3 10^3/UL (ref 4–10)

## 2023-08-12 RX ADMIN — INSULIN LISPRO SCH UNITS: 100 INJECTION, SOLUTION INTRAVENOUS; SUBCUTANEOUS at 10:25

## 2023-08-12 RX ADMIN — INSULIN LISPRO SCH UNITS: 100 INJECTION, SOLUTION INTRAVENOUS; SUBCUTANEOUS at 13:38

## 2023-08-12 RX ADMIN — METOPROLOL TARTRATE SCH MG: 100 TABLET, FILM COATED ORAL at 10:28

## 2024-12-14 ENCOUNTER — HOSPITAL ENCOUNTER (INPATIENT)
Dept: HOSPITAL 53 - M ED | Age: 69
LOS: 4 days | Discharge: HOME HEALTH | DRG: 291 | End: 2024-12-18
Attending: STUDENT IN AN ORGANIZED HEALTH CARE EDUCATION/TRAINING PROGRAM | Admitting: GENERAL PRACTICE
Payer: MEDICARE

## 2024-12-14 VITALS — TEMPERATURE: 98.5 F | DIASTOLIC BLOOD PRESSURE: 88 MMHG | SYSTOLIC BLOOD PRESSURE: 129 MMHG | OXYGEN SATURATION: 95 %

## 2024-12-14 VITALS — OXYGEN SATURATION: 93 % | SYSTOLIC BLOOD PRESSURE: 152 MMHG | DIASTOLIC BLOOD PRESSURE: 94 MMHG

## 2024-12-14 VITALS — DIASTOLIC BLOOD PRESSURE: 63 MMHG | OXYGEN SATURATION: 93 % | SYSTOLIC BLOOD PRESSURE: 90 MMHG | TEMPERATURE: 96.8 F

## 2024-12-14 VITALS — BODY MASS INDEX: 47.09 KG/M2 | WEIGHT: 293 LBS | HEIGHT: 66 IN

## 2024-12-14 VITALS — SYSTOLIC BLOOD PRESSURE: 107 MMHG | DIASTOLIC BLOOD PRESSURE: 83 MMHG | OXYGEN SATURATION: 94 %

## 2024-12-14 VITALS — SYSTOLIC BLOOD PRESSURE: 138 MMHG | OXYGEN SATURATION: 91 % | DIASTOLIC BLOOD PRESSURE: 75 MMHG

## 2024-12-14 VITALS — DIASTOLIC BLOOD PRESSURE: 89 MMHG | OXYGEN SATURATION: 94 % | SYSTOLIC BLOOD PRESSURE: 144 MMHG

## 2024-12-14 VITALS — SYSTOLIC BLOOD PRESSURE: 135 MMHG | DIASTOLIC BLOOD PRESSURE: 94 MMHG | OXYGEN SATURATION: 92 %

## 2024-12-14 VITALS — SYSTOLIC BLOOD PRESSURE: 142 MMHG | TEMPERATURE: 97 F | DIASTOLIC BLOOD PRESSURE: 87 MMHG | OXYGEN SATURATION: 93 %

## 2024-12-14 VITALS — SYSTOLIC BLOOD PRESSURE: 131 MMHG | OXYGEN SATURATION: 93 % | DIASTOLIC BLOOD PRESSURE: 78 MMHG

## 2024-12-14 DIAGNOSIS — Z86.16: ICD-10-CM

## 2024-12-14 DIAGNOSIS — Z88.1: ICD-10-CM

## 2024-12-14 DIAGNOSIS — J18.9: ICD-10-CM

## 2024-12-14 DIAGNOSIS — I11.0: Primary | ICD-10-CM

## 2024-12-14 DIAGNOSIS — E87.3: ICD-10-CM

## 2024-12-14 DIAGNOSIS — M19.90: ICD-10-CM

## 2024-12-14 DIAGNOSIS — F32.A: ICD-10-CM

## 2024-12-14 DIAGNOSIS — I48.21: ICD-10-CM

## 2024-12-14 DIAGNOSIS — E11.42: ICD-10-CM

## 2024-12-14 DIAGNOSIS — R00.1: ICD-10-CM

## 2024-12-14 DIAGNOSIS — R33.9: ICD-10-CM

## 2024-12-14 DIAGNOSIS — Z91.119: ICD-10-CM

## 2024-12-14 DIAGNOSIS — F41.9: ICD-10-CM

## 2024-12-14 DIAGNOSIS — I73.9: ICD-10-CM

## 2024-12-14 DIAGNOSIS — I50.31: ICD-10-CM

## 2024-12-14 DIAGNOSIS — E78.5: ICD-10-CM

## 2024-12-14 DIAGNOSIS — I27.20: ICD-10-CM

## 2024-12-14 DIAGNOSIS — Z79.899: ICD-10-CM

## 2024-12-14 DIAGNOSIS — J98.11: ICD-10-CM

## 2024-12-14 DIAGNOSIS — F25.0: ICD-10-CM

## 2024-12-14 DIAGNOSIS — G89.29: ICD-10-CM

## 2024-12-14 DIAGNOSIS — M54.9: ICD-10-CM

## 2024-12-14 DIAGNOSIS — Z87.891: ICD-10-CM

## 2024-12-14 DIAGNOSIS — M79.672: ICD-10-CM

## 2024-12-14 DIAGNOSIS — E66.2: ICD-10-CM

## 2024-12-14 DIAGNOSIS — E11.51: ICD-10-CM

## 2024-12-14 LAB
APTT BLD: 24 SECONDS (ref 24.8–34.2)
BASOPHILS # BLD AUTO: 0 10^3/UL (ref 0–0.2)
BASOPHILS NFR BLD AUTO: 0.4 % (ref 0–1)
BUN SERPL-MCNC: 17 MG/DL (ref 9–23)
BUN SERPL-MCNC: 18 MG/DL (ref 9–23)
CALCIUM SERPL-MCNC: 8.4 MG/DL (ref 8.3–10.6)
CALCIUM SERPL-MCNC: 8.5 MG/DL (ref 8.3–10.6)
CHLORIDE SERPL-SCNC: 100 MMOL/L (ref 98–107)
CHLORIDE SERPL-SCNC: 104 MMOL/L (ref 98–107)
CK MB CFR.DF SERPL CALC: 1.88
CK MB CFR.DF SERPL CALC: 2.77
CK MB SERPL-MCNC: < 1 NG/ML (ref ?–3.6)
CK MB SERPL-MCNC: < 1 NG/ML (ref ?–3.6)
CK SERPL-CCNC: 36 U/L (ref 34–145)
CK SERPL-CCNC: 53 U/L (ref 34–145)
CO2 SERPL-SCNC: 27 MMOL/L (ref 20–31)
CO2 SERPL-SCNC: 31 MMOL/L (ref 20–31)
CREAT SERPL-MCNC: 0.64 MG/DL (ref 0.55–1.3)
CREAT SERPL-MCNC: 0.75 MG/DL (ref 0.55–1.3)
EOSINOPHIL # BLD AUTO: 0 10^3/UL (ref 0–0.5)
EOSINOPHIL NFR BLD AUTO: 0.6 % (ref 0–3)
GFR SERPL CREATININE-BSD FRML MDRD: > 60 ML/MIN/{1.73_M2} (ref 39–?)
GFR SERPL CREATININE-BSD FRML MDRD: > 60 ML/MIN/{1.73_M2} (ref 39–?)
GLUCOSE SERPL-MCNC: 132 MG/DL (ref 74–106)
GLUCOSE SERPL-MCNC: 152 MG/DL (ref 74–106)
HCT VFR BLD AUTO: 46.4 % (ref 36–47)
HGB BLD-MCNC: 14.7 G/DL (ref 12–15.5)
INR PPP: 1.2
LYMPHOCYTES # BLD AUTO: 1 10^3/UL (ref 1.5–5)
LYMPHOCYTES NFR BLD AUTO: 19.1 % (ref 24–44)
MCH RBC QN AUTO: 29.3 PG (ref 27–33)
MCHC RBC AUTO-ENTMCNC: 31.7 G/DL (ref 32–36.5)
MCV RBC AUTO: 92.6 FL (ref 80–96)
MONOCYTES # BLD AUTO: 0.5 10^3/UL (ref 0–0.8)
MONOCYTES NFR BLD AUTO: 10.2 % (ref 2–8)
NEUTROPHILS # BLD AUTO: 3.7 10^3/UL (ref 1.5–8.5)
NEUTROPHILS NFR BLD AUTO: 69.3 % (ref 36–66)
PLATELET # BLD AUTO: 198 10^3/UL (ref 150–450)
POTASSIUM SERPL-SCNC: 4 MMOL/L (ref 3.5–5.1)
POTASSIUM SERPL-SCNC: 5 MMOL/L (ref 3.5–5.1)
PROCALCITONIN SERPL-MCNC: 0.1 NG/ML
PROTHROMBIN TIME: 15.5 SECONDS (ref 12.5–14.5)
RBC # BLD AUTO: 5.01 10^6/UL (ref 4–5.4)
SODIUM SERPL-SCNC: 140 MMOL/L (ref 136–145)
SODIUM SERPL-SCNC: 142 MMOL/L (ref 136–145)
WBC # BLD AUTO: 5.3 10^3/UL (ref 4–10)

## 2024-12-14 PROCEDURE — B246ZZZ ULTRASONOGRAPHY OF RIGHT AND LEFT HEART: ICD-10-PCS | Performed by: INTERNAL MEDICINE

## 2024-12-14 RX ADMIN — METOPROLOL TARTRATE SCH MG: 25 TABLET, FILM COATED ORAL at 15:40

## 2024-12-14 RX ADMIN — AZITHROMYCIN MONOHYDRATE ONE MG: 250 TABLET ORAL at 13:44

## 2024-12-14 RX ADMIN — APIXABAN SCH MG: 5 TABLET, FILM COATED ORAL at 21:09

## 2024-12-14 RX ADMIN — FUROSEMIDE ONE MG: 10 INJECTION, SOLUTION INTRAMUSCULAR; INTRAVENOUS at 11:16

## 2024-12-14 RX ADMIN — DIGOXIN STA MG: 0.25 INJECTION INTRAMUSCULAR; INTRAVENOUS at 13:44

## 2024-12-14 RX ADMIN — Medication SCH DOSE: at 21:50

## 2024-12-14 RX ADMIN — IBUPROFEN ONE MG: 400 TABLET, FILM COATED ORAL at 21:50

## 2024-12-14 RX ADMIN — METOPROLOL TARTRATE SCH MG: 5 INJECTION INTRAVENOUS at 15:26

## 2024-12-14 RX ADMIN — FUROSEMIDE SCH MG: 10 INJECTION, SOLUTION INTRAMUSCULAR; INTRAVENOUS at 15:14

## 2024-12-15 VITALS — TEMPERATURE: 96.8 F | DIASTOLIC BLOOD PRESSURE: 90 MMHG | SYSTOLIC BLOOD PRESSURE: 126 MMHG | OXYGEN SATURATION: 93 %

## 2024-12-15 VITALS — DIASTOLIC BLOOD PRESSURE: 87 MMHG | SYSTOLIC BLOOD PRESSURE: 122 MMHG | OXYGEN SATURATION: 94 %

## 2024-12-15 VITALS — DIASTOLIC BLOOD PRESSURE: 93 MMHG | SYSTOLIC BLOOD PRESSURE: 158 MMHG | OXYGEN SATURATION: 93 % | TEMPERATURE: 96.8 F

## 2024-12-15 VITALS — TEMPERATURE: 97.5 F | OXYGEN SATURATION: 91 % | DIASTOLIC BLOOD PRESSURE: 85 MMHG | SYSTOLIC BLOOD PRESSURE: 131 MMHG

## 2024-12-15 VITALS — DIASTOLIC BLOOD PRESSURE: 87 MMHG | SYSTOLIC BLOOD PRESSURE: 136 MMHG | OXYGEN SATURATION: 92 %

## 2024-12-15 VITALS — DIASTOLIC BLOOD PRESSURE: 81 MMHG | OXYGEN SATURATION: 94 % | TEMPERATURE: 98.3 F | SYSTOLIC BLOOD PRESSURE: 136 MMHG

## 2024-12-15 VITALS — OXYGEN SATURATION: 93 % | DIASTOLIC BLOOD PRESSURE: 76 MMHG | SYSTOLIC BLOOD PRESSURE: 114 MMHG | TEMPERATURE: 97.3 F

## 2024-12-15 VITALS — OXYGEN SATURATION: 93 %

## 2024-12-15 VITALS — OXYGEN SATURATION: 92 % | TEMPERATURE: 98.6 F | DIASTOLIC BLOOD PRESSURE: 94 MMHG | SYSTOLIC BLOOD PRESSURE: 118 MMHG

## 2024-12-15 LAB
BASOPHILS # BLD AUTO: 0 10^3/UL (ref 0–0.2)
BASOPHILS NFR BLD AUTO: 0.4 % (ref 0–1)
BUN SERPL-MCNC: 17 MG/DL (ref 9–23)
CALCIUM SERPL-MCNC: 8.4 MG/DL (ref 8.3–10.6)
CHLORIDE SERPL-SCNC: 104 MMOL/L (ref 98–107)
CO2 SERPL-SCNC: 28 MMOL/L (ref 20–31)
CREAT SERPL-MCNC: 0.75 MG/DL (ref 0.55–1.3)
EOSINOPHIL # BLD AUTO: 0.1 10^3/UL (ref 0–0.5)
EOSINOPHIL NFR BLD AUTO: 1.2 % (ref 0–3)
GFR SERPL CREATININE-BSD FRML MDRD: > 60 ML/MIN/{1.73_M2} (ref 39–?)
GLUCOSE SERPL-MCNC: 116 MG/DL (ref 74–106)
HCT VFR BLD AUTO: 46.9 % (ref 36–47)
HGB BLD-MCNC: 14.6 G/DL (ref 12–15.5)
LYMPHOCYTES # BLD AUTO: 1.4 10^3/UL (ref 1.5–5)
LYMPHOCYTES NFR BLD AUTO: 25 % (ref 24–44)
MAGNESIUM SERPL-MCNC: 1.8 MG/DL (ref 1.8–2.4)
MCH RBC QN AUTO: 28.8 PG (ref 27–33)
MCHC RBC AUTO-ENTMCNC: 31.1 G/DL (ref 32–36.5)
MCV RBC AUTO: 92.5 FL (ref 80–96)
MONOCYTES # BLD AUTO: 0.7 10^3/UL (ref 0–0.8)
MONOCYTES NFR BLD AUTO: 12.4 % (ref 2–8)
NEUTROPHILS # BLD AUTO: 3.5 10^3/UL (ref 1.5–8.5)
NEUTROPHILS NFR BLD AUTO: 60.8 % (ref 36–66)
PLATELET # BLD AUTO: 208 10^3/UL (ref 150–450)
POTASSIUM SERPL-SCNC: 4 MMOL/L (ref 3.5–5.1)
RBC # BLD AUTO: 5.07 10^6/UL (ref 4–5.4)
SODIUM SERPL-SCNC: 143 MMOL/L (ref 136–145)
WBC # BLD AUTO: 5.7 10^3/UL (ref 4–10)

## 2024-12-15 RX ADMIN — METOPROLOL TARTRATE SCH MG: 25 TABLET, FILM COATED ORAL at 21:09

## 2024-12-15 RX ADMIN — MAGNESIUM SULFATE IN DEXTROSE ONE MLS/HR: 10 INJECTION, SOLUTION INTRAVENOUS at 11:03

## 2024-12-15 RX ADMIN — LIDOCAINE ONE PATCH: 50 PATCH CUTANEOUS at 00:42

## 2024-12-15 RX ADMIN — AZITHROMYCIN MONOHYDRATE SCH MG: 250 TABLET ORAL at 09:10

## 2024-12-15 RX ADMIN — ACETAMINOPHEN ONE MG: 500 TABLET ORAL at 18:14

## 2024-12-16 VITALS — OXYGEN SATURATION: 87 % | DIASTOLIC BLOOD PRESSURE: 62 MMHG | TEMPERATURE: 97.6 F | SYSTOLIC BLOOD PRESSURE: 112 MMHG

## 2024-12-16 VITALS — DIASTOLIC BLOOD PRESSURE: 62 MMHG | SYSTOLIC BLOOD PRESSURE: 129 MMHG | TEMPERATURE: 97.2 F | OXYGEN SATURATION: 91 %

## 2024-12-16 VITALS — DIASTOLIC BLOOD PRESSURE: 81 MMHG | OXYGEN SATURATION: 93 % | TEMPERATURE: 97.5 F | SYSTOLIC BLOOD PRESSURE: 140 MMHG

## 2024-12-16 VITALS — SYSTOLIC BLOOD PRESSURE: 140 MMHG | TEMPERATURE: 97.3 F | OXYGEN SATURATION: 93 % | DIASTOLIC BLOOD PRESSURE: 80 MMHG

## 2024-12-16 LAB
BASOPHILS # BLD AUTO: 0 10^3/UL (ref 0–0.2)
BASOPHILS NFR BLD AUTO: 0.6 % (ref 0–1)
BUN SERPL-MCNC: 17 MG/DL (ref 9–23)
CALCIUM SERPL-MCNC: 8.6 MG/DL (ref 8.3–10.6)
CHLORIDE SERPL-SCNC: 97 MMOL/L (ref 98–107)
CHOLEST SERPL-MCNC: 95 MG/DL (ref ?–200)
CHOLEST/HDLC SERPL: 4.39 {RATIO} (ref ?–5)
CO2 SERPL-SCNC: 34 MMOL/L (ref 20–31)
CREAT SERPL-MCNC: 0.66 MG/DL (ref 0.55–1.3)
EOSINOPHIL # BLD AUTO: 0.1 10^3/UL (ref 0–0.5)
EOSINOPHIL NFR BLD AUTO: 1.5 % (ref 0–3)
EST. AVERAGE GLUCOSE BLD GHB EST-MCNC: 194 MG/DL (ref 60–110)
FT4I SERPL CALC-MCNC: 2.1 % (ref 1.3–4.8)
GFR SERPL CREATININE-BSD FRML MDRD: > 60 ML/MIN/{1.73_M2} (ref 39–?)
GLUCOSE SERPL-MCNC: 128 MG/DL (ref 74–106)
HCT VFR BLD AUTO: 49.8 % (ref 36–47)
HDLC SERPL-MCNC: 21.6 MG/DL (ref 40–?)
HGB BLD-MCNC: 15.5 G/DL (ref 12–15.5)
LDLC SERPL CALC-MCNC: 57.6 MG/DL (ref ?–100)
LYMPHOCYTES # BLD AUTO: 1.3 10^3/UL (ref 1.5–5)
LYMPHOCYTES NFR BLD AUTO: 24.8 % (ref 24–44)
MAGNESIUM SERPL-MCNC: 1.8 MG/DL (ref 1.8–2.4)
MCH RBC QN AUTO: 28.8 PG (ref 27–33)
MCHC RBC AUTO-ENTMCNC: 31.1 G/DL (ref 32–36.5)
MCV RBC AUTO: 92.6 FL (ref 80–96)
MONOCYTES # BLD AUTO: 0.7 10^3/UL (ref 0–0.8)
MONOCYTES NFR BLD AUTO: 12.6 % (ref 2–8)
NEUTROPHILS # BLD AUTO: 3.1 10^3/UL (ref 1.5–8.5)
NEUTROPHILS NFR BLD AUTO: 60.1 % (ref 36–66)
NONHDLC SERPL-MCNC: 73.4 MG/DL
PLATELET # BLD AUTO: 164 10^3/UL (ref 150–450)
POTASSIUM SERPL-SCNC: 3.5 MMOL/L (ref 3.5–5.1)
RBC # BLD AUTO: 5.38 10^6/UL (ref 4–5.4)
SODIUM SERPL-SCNC: 141 MMOL/L (ref 136–145)
T3RU NFR SERPL: 43.8 % (ref 22.5–37)
T4 SERPL-MCNC: 4.9 UG/DL (ref 4.5–10.9)
TRIGL SERPL-MCNC: 79 MG/DL (ref ?–150)
TSH SERPL DL<=0.005 MIU/L-ACNC: 0.45 UIU/ML (ref 0.55–4.78)
WBC # BLD AUTO: 5.2 10^3/UL (ref 4–10)

## 2024-12-16 RX ADMIN — GABAPENTIN ONE MG: 100 CAPSULE ORAL at 16:17

## 2024-12-16 RX ADMIN — ACETAMINOPHEN PRN MG: 500 TABLET ORAL at 06:43

## 2024-12-16 RX ADMIN — ASPIRIN 81 MG CHEWABLE TABLET ONE MG: 81 TABLET CHEWABLE at 13:20

## 2024-12-16 RX ADMIN — INSULIN LISPRO SCH UNITS: 100 INJECTION, SOLUTION INTRAVENOUS; SUBCUTANEOUS at 13:19

## 2024-12-16 RX ADMIN — Medication SCH: at 09:00

## 2024-12-16 RX ADMIN — CEFDINIR SCH MG: 300 CAPSULE ORAL at 10:04

## 2024-12-16 RX ADMIN — INSULIN LISPRO SCH UNITS: 100 INJECTION, SOLUTION INTRAVENOUS; SUBCUTANEOUS at 20:58

## 2024-12-17 VITALS — TEMPERATURE: 97.7 F | DIASTOLIC BLOOD PRESSURE: 81 MMHG | OXYGEN SATURATION: 91 % | SYSTOLIC BLOOD PRESSURE: 132 MMHG

## 2024-12-17 VITALS — TEMPERATURE: 97.9 F | OXYGEN SATURATION: 94 % | DIASTOLIC BLOOD PRESSURE: 88 MMHG | SYSTOLIC BLOOD PRESSURE: 155 MMHG

## 2024-12-17 VITALS — TEMPERATURE: 97.7 F | DIASTOLIC BLOOD PRESSURE: 89 MMHG | SYSTOLIC BLOOD PRESSURE: 158 MMHG | OXYGEN SATURATION: 91 %

## 2024-12-17 LAB
BASOPHILS # BLD AUTO: 0 10^3/UL (ref 0–0.2)
BASOPHILS NFR BLD AUTO: 0.6 % (ref 0–1)
BUN SERPL-MCNC: 18 MG/DL (ref 9–23)
CALCIUM SERPL-MCNC: 8.8 MG/DL (ref 8.3–10.6)
CHLORIDE SERPL-SCNC: 92 MMOL/L (ref 98–107)
CO2 SERPL-SCNC: 37 MMOL/L (ref 20–31)
CREAT SERPL-MCNC: 0.59 MG/DL (ref 0.55–1.3)
EOSINOPHIL # BLD AUTO: 0.1 10^3/UL (ref 0–0.5)
EOSINOPHIL NFR BLD AUTO: 1.8 % (ref 0–3)
GFR SERPL CREATININE-BSD FRML MDRD: > 60 ML/MIN/{1.73_M2} (ref 39–?)
GLUCOSE SERPL-MCNC: 136 MG/DL (ref 74–106)
HCT VFR BLD AUTO: 50 % (ref 36–47)
HGB BLD-MCNC: 15.8 G/DL (ref 12–15.5)
LYMPHOCYTES # BLD AUTO: 1.1 10^3/UL (ref 1.5–5)
LYMPHOCYTES NFR BLD AUTO: 22.3 % (ref 24–44)
MAGNESIUM SERPL-MCNC: 1.8 MG/DL (ref 1.8–2.4)
MCH RBC QN AUTO: 28.9 PG (ref 27–33)
MCHC RBC AUTO-ENTMCNC: 31.6 G/DL (ref 32–36.5)
MCV RBC AUTO: 91.4 FL (ref 80–96)
MONOCYTES # BLD AUTO: 0.7 10^3/UL (ref 0–0.8)
MONOCYTES NFR BLD AUTO: 14.3 % (ref 2–8)
NEUTROPHILS # BLD AUTO: 3.1 10^3/UL (ref 1.5–8.5)
NEUTROPHILS NFR BLD AUTO: 60.8 % (ref 36–66)
PLATELET # BLD AUTO: 160 10^3/UL (ref 150–450)
POTASSIUM SERPL-SCNC: 3.2 MMOL/L (ref 3.5–5.1)
RBC # BLD AUTO: 5.47 10^6/UL (ref 4–5.4)
SODIUM SERPL-SCNC: 138 MMOL/L (ref 136–145)
WBC # BLD AUTO: 5.1 10^3/UL (ref 4–10)

## 2024-12-17 RX ADMIN — INFLUENZA A VIRUS A/WEST VIRGINIA/30/2022 (H1N1) RECOMBINANT HEMAGGLUTININ ANTIGEN, INFLUENZA A VIRUS A/MASSACHUSETTS/18/2022 (H3N2) RECOMBINANT HEMAGGLUTININ ANTIGEN, AND INFLUENZA B VIRUS B/AUSTRIA/1359417/2021 RECOMBINANT HEMAGGLUTININ ANTIGEN ONE ML: 45; 45; 45 INJECTION INTRAMUSCULAR at 13:52

## 2024-12-17 RX ADMIN — GABAPENTIN SCH MG: 100 CAPSULE ORAL at 09:00

## 2024-12-17 RX ADMIN — ATORVASTATIN CALCIUM SCH MG: 20 TABLET, FILM COATED ORAL at 20:46

## 2024-12-17 RX ADMIN — POTASSIUM CHLORIDE SCH MEQ: 750 TABLET, EXTENDED RELEASE ORAL at 14:48

## 2024-12-17 RX ADMIN — TORSEMIDE SCH MG: 20 TABLET ORAL at 17:15

## 2024-12-17 RX ADMIN — ASPIRIN 81 MG CHEWABLE TABLET SCH MG: 81 TABLET CHEWABLE at 09:00

## 2024-12-17 RX ADMIN — SPIRONOLACTONE SCH MG: 25 TABLET, FILM COATED ORAL at 17:14

## 2024-12-17 RX ADMIN — METOPROLOL TARTRATE SCH MG: 25 TABLET, FILM COATED ORAL at 20:46

## 2024-12-18 VITALS — DIASTOLIC BLOOD PRESSURE: 80 MMHG | OXYGEN SATURATION: 95 % | SYSTOLIC BLOOD PRESSURE: 132 MMHG | TEMPERATURE: 97.9 F

## 2024-12-18 VITALS — OXYGEN SATURATION: 94 % | SYSTOLIC BLOOD PRESSURE: 137 MMHG | TEMPERATURE: 97.5 F | DIASTOLIC BLOOD PRESSURE: 95 MMHG

## 2024-12-18 VITALS — SYSTOLIC BLOOD PRESSURE: 125 MMHG | DIASTOLIC BLOOD PRESSURE: 65 MMHG

## 2024-12-18 LAB
BASOPHILS # BLD AUTO: 0 10^3/UL (ref 0–0.2)
BASOPHILS NFR BLD AUTO: 0.4 % (ref 0–1)
BUN SERPL-MCNC: 21 MG/DL (ref 9–23)
CALCIUM SERPL-MCNC: 9 MG/DL (ref 8.3–10.6)
CHLORIDE SERPL-SCNC: 95 MMOL/L (ref 98–107)
CO2 SERPL-SCNC: 36 MMOL/L (ref 20–31)
CREAT SERPL-MCNC: 0.67 MG/DL (ref 0.55–1.3)
EOSINOPHIL # BLD AUTO: 0.1 10^3/UL (ref 0–0.5)
EOSINOPHIL NFR BLD AUTO: 1.9 % (ref 0–3)
GFR SERPL CREATININE-BSD FRML MDRD: > 60 ML/MIN/{1.73_M2} (ref 39–?)
GLUCOSE SERPL-MCNC: 137 MG/DL (ref 74–106)
HCT VFR BLD AUTO: 49 % (ref 36–47)
HGB BLD-MCNC: 15.4 G/DL (ref 12–15.5)
LYMPHOCYTES # BLD AUTO: 1.4 10^3/UL (ref 1.5–5)
LYMPHOCYTES NFR BLD AUTO: 29 % (ref 24–44)
MAGNESIUM SERPL-MCNC: 1.8 MG/DL (ref 1.8–2.4)
MCH RBC QN AUTO: 28.7 PG (ref 27–33)
MCHC RBC AUTO-ENTMCNC: 31.4 G/DL (ref 32–36.5)
MCV RBC AUTO: 91.2 FL (ref 80–96)
MONOCYTES # BLD AUTO: 0.7 10^3/UL (ref 0–0.8)
MONOCYTES NFR BLD AUTO: 13.5 % (ref 2–8)
NEUTROPHILS # BLD AUTO: 2.7 10^3/UL (ref 1.5–8.5)
NEUTROPHILS NFR BLD AUTO: 55 % (ref 36–66)
PLATELET # BLD AUTO: 176 10^3/UL (ref 150–450)
POTASSIUM SERPL-SCNC: 4.2 MMOL/L (ref 3.5–5.1)
RBC # BLD AUTO: 5.37 10^6/UL (ref 4–5.4)
SODIUM SERPL-SCNC: 138 MMOL/L (ref 136–145)
WBC # BLD AUTO: 4.8 10^3/UL (ref 4–10)

## 2024-12-19 LAB — S PNEUM AG UR QL: NOT DETECTED

## 2025-01-09 ENCOUNTER — HOSPITAL ENCOUNTER (EMERGENCY)
Dept: HOSPITAL 53 - M ED | Age: 70
Discharge: HOME | End: 2025-01-09
Payer: MEDICARE

## 2025-01-09 VITALS — SYSTOLIC BLOOD PRESSURE: 138 MMHG | OXYGEN SATURATION: 97 % | DIASTOLIC BLOOD PRESSURE: 78 MMHG | TEMPERATURE: 96.3 F

## 2025-01-09 DIAGNOSIS — Z79.899: ICD-10-CM

## 2025-01-09 DIAGNOSIS — Z79.84: ICD-10-CM

## 2025-01-09 DIAGNOSIS — I48.91: ICD-10-CM

## 2025-01-09 DIAGNOSIS — Z88.8: ICD-10-CM

## 2025-01-09 DIAGNOSIS — E11.9: ICD-10-CM

## 2025-01-09 DIAGNOSIS — B37.31: ICD-10-CM

## 2025-01-09 DIAGNOSIS — M79.605: ICD-10-CM

## 2025-01-09 DIAGNOSIS — Z79.01: ICD-10-CM

## 2025-01-09 DIAGNOSIS — M79.604: Primary | ICD-10-CM

## 2025-01-09 DIAGNOSIS — Z79.2: ICD-10-CM

## 2025-01-09 LAB
BASOPHILS # BLD AUTO: 0 10^3/UL (ref 0–0.2)
BASOPHILS NFR BLD AUTO: 0.5 % (ref 0–1)
BUN SERPL-MCNC: 67 MG/DL (ref 9–23)
CALCIUM SERPL-MCNC: 10 MG/DL (ref 8.3–10.6)
CHLORIDE SERPL-SCNC: 93 MMOL/L (ref 98–107)
CO2 SERPL-SCNC: 29 MMOL/L (ref 20–31)
CREAT SERPL-MCNC: 1.17 MG/DL (ref 0.55–1.3)
EOSINOPHIL # BLD AUTO: 0.1 10^3/UL (ref 0–0.5)
EOSINOPHIL NFR BLD AUTO: 0.8 % (ref 0–3)
GFR SERPL CREATININE-BSD FRML MDRD: 48.5 ML/MIN/{1.73_M2} (ref 39–?)
GLUCOSE SERPL-MCNC: 155 MG/DL (ref 74–106)
HCT VFR BLD AUTO: 52.8 % (ref 36–47)
HGB BLD-MCNC: 17.3 G/DL (ref 12–15.5)
LEUKOCYTE ESTERASE UR AUTO RFX: NEGATIVE
LYMPHOCYTES # BLD AUTO: 1.7 10^3/UL (ref 1.5–5)
LYMPHOCYTES NFR BLD AUTO: 22 % (ref 24–44)
MCH RBC QN AUTO: 28.7 PG (ref 27–33)
MCHC RBC AUTO-ENTMCNC: 32.8 G/DL (ref 32–36.5)
MCV RBC AUTO: 87.7 FL (ref 80–96)
MONOCYTES # BLD AUTO: 0.8 10^3/UL (ref 0–0.8)
MONOCYTES NFR BLD AUTO: 10.5 % (ref 2–8)
NEUTROPHILS # BLD AUTO: 5.2 10^3/UL (ref 1.5–8.5)
NEUTROPHILS NFR BLD AUTO: 65.9 % (ref 36–66)
PLATELET # BLD AUTO: 156 10^3/UL (ref 150–450)
POTASSIUM SERPL-SCNC: 4.7 MMOL/L (ref 3.5–5.1)
RBC # BLD AUTO: 6.02 10^6/UL (ref 4–5.4)
SODIUM SERPL-SCNC: 132 MMOL/L (ref 136–145)
SQUAM EPITHELIAL CELL UR AURFX: 2 /HPF (ref 0–6)
WBC # BLD AUTO: 7.9 10^3/UL (ref 4–10)

## 2025-01-09 RX ADMIN — SODIUM CHLORIDE ONE MLS/HR: 9 INJECTION, SOLUTION INTRAVENOUS at 15:57

## 2025-03-26 ENCOUNTER — HOSPITAL ENCOUNTER (INPATIENT)
Dept: HOSPITAL 53 - M ED | Age: 70
LOS: 21 days | Discharge: NURSING, REHAB OR LONG-TERM CARE | DRG: 570 | End: 2025-04-16
Attending: INTERNAL MEDICINE | Admitting: FAMILY MEDICINE
Payer: MEDICARE

## 2025-03-26 VITALS — TEMPERATURE: 97 F | SYSTOLIC BLOOD PRESSURE: 121 MMHG | DIASTOLIC BLOOD PRESSURE: 82 MMHG | OXYGEN SATURATION: 90 %

## 2025-03-26 VITALS — BODY MASS INDEX: 36.99 KG/M2 | HEIGHT: 66 IN | WEIGHT: 230.16 LBS

## 2025-03-26 VITALS — OXYGEN SATURATION: 90 %
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
## 2025-03-26 DIAGNOSIS — Z87.891: ICD-10-CM
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
## 2025-03-26 DIAGNOSIS — G89.29: ICD-10-CM

## 2025-03-26 DIAGNOSIS — E11.42: ICD-10-CM

## 2025-03-26 DIAGNOSIS — Z91.198: ICD-10-CM

## 2025-03-26 DIAGNOSIS — S91.105A: ICD-10-CM

## 2025-03-26 DIAGNOSIS — R19.7: ICD-10-CM

## 2025-03-26 DIAGNOSIS — E11.51: ICD-10-CM

## 2025-03-26 DIAGNOSIS — L03.116: Primary | ICD-10-CM

## 2025-03-26 DIAGNOSIS — S99.922A: ICD-10-CM

## 2025-03-26 DIAGNOSIS — R32: ICD-10-CM

## 2025-03-26 DIAGNOSIS — I27.20: ICD-10-CM

## 2025-03-26 DIAGNOSIS — J18.9: ICD-10-CM

## 2025-03-26 DIAGNOSIS — I11.0: ICD-10-CM
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
## 2025-03-26 DIAGNOSIS — E66.9: ICD-10-CM

## 2025-03-26 DIAGNOSIS — I50.33: ICD-10-CM

## 2025-03-26 DIAGNOSIS — M19.90: ICD-10-CM

## 2025-03-26 DIAGNOSIS — F20.9: ICD-10-CM

## 2025-03-26 DIAGNOSIS — Z86.16: ICD-10-CM

## 2025-03-26 DIAGNOSIS — I48.21: ICD-10-CM
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
## 2025-03-26 DIAGNOSIS — M54.9: ICD-10-CM

## 2025-03-26 DIAGNOSIS — F32.A: ICD-10-CM

## 2025-03-26 DIAGNOSIS — F41.9: ICD-10-CM

## 2025-03-26 LAB
ALBUMIN SERPL BCG-MCNC: 3.1 G/DL (ref 3.2–5.2)
ALP SERPL-CCNC: 92 U/L (ref 35–104)
ALT SERPL W P-5'-P-CCNC: 17 U/L (ref 7–40)
AST SERPL-CCNC: 19 U/L (ref ?–34)
BASOPHILS NFR BLD AUTO: 0.6 % (ref 0–1)
BILIRUB CONJ SERPL-MCNC: 1.1 MG/DL (ref ?–0.4)
BILIRUB SERPL-MCNC: 2.1 MG/DL (ref 0.3–1.2)
BUN SERPL-MCNC: 14 MG/DL (ref 9–23)
CALCIUM SERPL-MCNC: 8.8 MG/DL (ref 8.3–10.6)
CHLORIDE SERPL-SCNC: 101 MMOL/L (ref 98–107)
CO2 SERPL-SCNC: 29 MMOL/L (ref 20–31)
CREAT SERPL-MCNC: 0.81 MG/DL (ref 0.55–1.3)
CRP SERPL-MCNC: 1.74 MG/DL (ref ?–1)
EOSINOPHIL # BLD AUTO: 0.1 10^3/UL (ref 0–0.5)
EOSINOPHIL NFR BLD AUTO: 1.3 % (ref 0–3)
ERYTHROCYTE [SEDIMENTATION RATE] IN BLOOD BY WESTERGREN METHOD: 31 MM/HR (ref 0–30)
GFR SERPL CREATININE-BSD FRML MDRD: > 60 ML/MIN/{1.73_M2} (ref 39–?)
GLUCOSE SERPL-MCNC: 77 MG/DL (ref 74–106)
HBV SURFACE AG SER-ACNC: NEGATIVE [IU]/L
HCT VFR BLD AUTO: 42.4 % (ref 36–47)
HGB BLD-MCNC: 13.3 G/DL (ref 12–15.5)
HIV 1+2 AB+HIV1 P24 AG SERPL QL IA: NEGATIVE
INR PPP: 1.12
LYMPHOCYTES # BLD AUTO: 1.4 10^3/UL (ref 1.5–5)
LYMPHOCYTES NFR BLD AUTO: 22.2 % (ref 24–44)
MAGNESIUM SERPL-MCNC: 1.9 MG/DL (ref 1.8–2.4)
MCH RBC QN AUTO: 32.9 PG (ref 27–33)
MCHC RBC AUTO-ENTMCNC: 31.4 G/DL (ref 32–36.5)
MONOCYTES # BLD AUTO: 0.6 10^3/UL (ref 0–0.8)
MONOCYTES NFR BLD AUTO: 8.6 % (ref 2–8)
NEUTROPHILS # BLD AUTO: 4.3 10^3/UL (ref 1.5–8.5)
NEUTROPHILS NFR BLD AUTO: 67.1 % (ref 36–66)
PLATELET # BLD AUTO: 239 10^3/UL (ref 150–450)
POTASSIUM SERPL-SCNC: 4.1 MMOL/L (ref 3.5–5.1)
PROCALCITONIN SERPL-MCNC: 0.09 NG/ML
PROT SERPL-MCNC: 6.7 G/DL (ref 5.7–8.2)
PROTHROMBIN TIME: 14.7 SECONDS (ref 12.5–14.5)
RBC # BLD AUTO: 4.04 10^6/UL (ref 4–5.4)
SODIUM SERPL-SCNC: 140 MMOL/L (ref 136–145)
WBC # BLD AUTO: 6.4 10^3/UL (ref 4–10)

## 2025-03-26 RX ADMIN — VANCOMYCIN HYDROCHLORIDE ONE MLS/HR: 2 INJECTION, POWDER, LYOPHILIZED, FOR SOLUTION INTRAVENOUS at 17:43

## 2025-03-27 VITALS — OXYGEN SATURATION: 93 % | SYSTOLIC BLOOD PRESSURE: 138 MMHG | TEMPERATURE: 97.2 F | DIASTOLIC BLOOD PRESSURE: 86 MMHG

## 2025-03-27 VITALS — TEMPERATURE: 97 F | SYSTOLIC BLOOD PRESSURE: 114 MMHG | OXYGEN SATURATION: 91 % | DIASTOLIC BLOOD PRESSURE: 77 MMHG

## 2025-03-27 VITALS — TEMPERATURE: 97.2 F | OXYGEN SATURATION: 91 % | DIASTOLIC BLOOD PRESSURE: 80 MMHG | SYSTOLIC BLOOD PRESSURE: 109 MMHG

## 2025-03-27 VITALS — OXYGEN SATURATION: 95 %

## 2025-03-27 VITALS — DIASTOLIC BLOOD PRESSURE: 78 MMHG | TEMPERATURE: 97.2 F | OXYGEN SATURATION: 92 % | SYSTOLIC BLOOD PRESSURE: 116 MMHG

## 2025-03-27 VITALS — DIASTOLIC BLOOD PRESSURE: 82 MMHG | TEMPERATURE: 97.2 F | OXYGEN SATURATION: 91 % | SYSTOLIC BLOOD PRESSURE: 134 MMHG

## 2025-03-27 VITALS — OXYGEN SATURATION: 92 %

## 2025-03-27 VITALS — OXYGEN SATURATION: 92 % | DIASTOLIC BLOOD PRESSURE: 67 MMHG | SYSTOLIC BLOOD PRESSURE: 133 MMHG | TEMPERATURE: 97.3 F

## 2025-03-27 VITALS — OXYGEN SATURATION: 83 %

## 2025-03-27 LAB
ALBUMIN SERPL BCG-MCNC: 3.5 G/DL (ref 3.2–5.2)
ALP SERPL-CCNC: 103 U/L (ref 35–104)
ALT SERPL W P-5'-P-CCNC: 24 U/L (ref 7–40)
AST SERPL-CCNC: 44 U/L (ref ?–34)
BILIRUB SERPL-MCNC: 2.4 MG/DL (ref 0.3–1.2)
BUN SERPL-MCNC: 15 MG/DL (ref 9–23)
CALCIUM SERPL-MCNC: 9.3 MG/DL (ref 8.3–10.6)
CHLORIDE SERPL-SCNC: 103 MMOL/L (ref 98–107)
CO2 SERPL-SCNC: 26 MMOL/L (ref 20–31)
CREAT SERPL-MCNC: 0.79 MG/DL (ref 0.55–1.3)
CRP SERPL-MCNC: 2.28 MG/DL (ref ?–1)
ERYTHROCYTE [SEDIMENTATION RATE] IN BLOOD BY WESTERGREN METHOD: 61 MM/HR (ref 0–30)
GFR SERPL CREATININE-BSD FRML MDRD: > 60 ML/MIN/{1.73_M2} (ref 39–?)
GLUCOSE SERPL-MCNC: 100 MG/DL (ref 74–106)
HCT VFR BLD AUTO: 47.6 % (ref 36–47)
HGB BLD-MCNC: 14.7 G/DL (ref 12–15.5)
MCH RBC QN AUTO: 32.7 PG (ref 27–33)
MCHC RBC AUTO-ENTMCNC: 30.9 G/DL (ref 32–36.5)
MCV RBC AUTO: 105.8 FL (ref 80–96)
PLATELET # BLD AUTO: 267 10^3/UL (ref 150–450)
POTASSIUM SERPL-SCNC: 4.6 MMOL/L (ref 3.5–5.1)
PROT SERPL-MCNC: 7.4 G/DL (ref 5.7–8.2)
SODIUM SERPL-SCNC: 142 MMOL/L (ref 136–145)
WBC # BLD AUTO: 6.9 10^3/UL (ref 4–10)

## 2025-03-27 PROCEDURE — B246ZZZ ULTRASONOGRAPHY OF RIGHT AND LEFT HEART: ICD-10-PCS

## 2025-03-27 RX ADMIN — POTASSIUM CHLORIDE SCH MEQ: 750 CAPSULE, EXTENDED RELEASE ORAL at 09:27

## 2025-03-27 RX ADMIN — VANCOMYCIN HYDROCHLORIDE SCH MLS/HR: 1.5 INJECTION, POWDER, LYOPHILIZED, FOR SOLUTION INTRAVENOUS at 09:26

## 2025-03-27 RX ADMIN — FUROSEMIDE SCH MG: 20 TABLET ORAL at 09:27

## 2025-03-28 VITALS — DIASTOLIC BLOOD PRESSURE: 74 MMHG | OXYGEN SATURATION: 93 % | TEMPERATURE: 97.3 F | SYSTOLIC BLOOD PRESSURE: 108 MMHG

## 2025-03-28 VITALS — SYSTOLIC BLOOD PRESSURE: 127 MMHG | OXYGEN SATURATION: 94 % | TEMPERATURE: 97.5 F | DIASTOLIC BLOOD PRESSURE: 90 MMHG

## 2025-03-28 VITALS — DIASTOLIC BLOOD PRESSURE: 72 MMHG | OXYGEN SATURATION: 91 % | TEMPERATURE: 97.2 F | SYSTOLIC BLOOD PRESSURE: 129 MMHG

## 2025-03-28 VITALS — TEMPERATURE: 97.7 F | OXYGEN SATURATION: 91 % | SYSTOLIC BLOOD PRESSURE: 131 MMHG | DIASTOLIC BLOOD PRESSURE: 81 MMHG

## 2025-03-28 VITALS — TEMPERATURE: 97 F | SYSTOLIC BLOOD PRESSURE: 112 MMHG | OXYGEN SATURATION: 96 % | DIASTOLIC BLOOD PRESSURE: 78 MMHG

## 2025-03-28 VITALS — OXYGEN SATURATION: 95 % | SYSTOLIC BLOOD PRESSURE: 136 MMHG | DIASTOLIC BLOOD PRESSURE: 87 MMHG | TEMPERATURE: 97.3 F

## 2025-03-29 VITALS — TEMPERATURE: 97.5 F | SYSTOLIC BLOOD PRESSURE: 109 MMHG | DIASTOLIC BLOOD PRESSURE: 79 MMHG | OXYGEN SATURATION: 93 %

## 2025-03-29 VITALS — TEMPERATURE: 97.3 F | DIASTOLIC BLOOD PRESSURE: 89 MMHG | SYSTOLIC BLOOD PRESSURE: 132 MMHG | OXYGEN SATURATION: 93 %

## 2025-03-29 VITALS — OXYGEN SATURATION: 95 % | TEMPERATURE: 97 F | SYSTOLIC BLOOD PRESSURE: 136 MMHG | DIASTOLIC BLOOD PRESSURE: 93 MMHG

## 2025-03-29 VITALS — DIASTOLIC BLOOD PRESSURE: 90 MMHG | OXYGEN SATURATION: 95 % | TEMPERATURE: 97.2 F | SYSTOLIC BLOOD PRESSURE: 126 MMHG

## 2025-03-29 LAB
ALBUMIN SERPL BCG-MCNC: 2.9 G/DL (ref 3.2–5.2)
ALP SERPL-CCNC: 111 U/L (ref 35–104)
ALT SERPL W P-5'-P-CCNC: 27 U/L (ref 7–40)
AST SERPL-CCNC: 21 U/L (ref ?–34)
BASOPHILS NFR BLD AUTO: 0.6 % (ref 0–1)
BILIRUB SERPL-MCNC: 1.5 MG/DL (ref 0.3–1.2)
BUN SERPL-MCNC: 16 MG/DL (ref 9–23)
CALCIUM SERPL-MCNC: 8.3 MG/DL (ref 8.3–10.6)
CHLORIDE SERPL-SCNC: 105 MMOL/L (ref 98–107)
CO2 SERPL-SCNC: 31 MMOL/L (ref 20–31)
CREAT SERPL-MCNC: 0.74 MG/DL (ref 0.55–1.3)
CRP SERPL-MCNC: 2.64 MG/DL (ref ?–1)
EOSINOPHIL # BLD AUTO: 0.2 10^3/UL (ref 0–0.5)
EOSINOPHIL NFR BLD AUTO: 2.4 % (ref 0–3)
ERYTHROCYTE [SEDIMENTATION RATE] IN BLOOD BY WESTERGREN METHOD: 42 MM/HR (ref 0–30)
GFR SERPL CREATININE-BSD FRML MDRD: > 60 ML/MIN/{1.73_M2} (ref 39–?)
GLUCOSE SERPL-MCNC: 100 MG/DL (ref 74–106)
HCT VFR BLD AUTO: 41.3 % (ref 36–47)
HGB BLD-MCNC: 12.9 G/DL (ref 12–15.5)
LYMPHOCYTES NFR BLD AUTO: 16.6 % (ref 24–44)
MCH RBC QN AUTO: 33.3 PG (ref 27–33)
MCHC RBC AUTO-ENTMCNC: 31.2 G/DL (ref 32–36.5)
MCV RBC AUTO: 106.7 FL (ref 80–96)
MONOCYTES # BLD AUTO: 0.6 10^3/UL (ref 0–0.8)
MONOCYTES NFR BLD AUTO: 9.2 % (ref 2–8)
NEUTROPHILS # BLD AUTO: 4.4 10^3/UL (ref 1.5–8.5)
NEUTROPHILS NFR BLD AUTO: 70.9 % (ref 36–66)
PLATELET # BLD AUTO: 234 10^3/UL (ref 150–450)
POTASSIUM SERPL-SCNC: 4.5 MMOL/L (ref 3.5–5.1)
PROT SERPL-MCNC: 6.5 G/DL (ref 5.7–8.2)
RBC # BLD AUTO: 3.87 10^6/UL (ref 4–5.4)
SODIUM SERPL-SCNC: 143 MMOL/L (ref 136–145)
WBC # BLD AUTO: 6.2 10^3/UL (ref 4–10)

## 2025-03-30 VITALS — TEMPERATURE: 97.5 F | DIASTOLIC BLOOD PRESSURE: 78 MMHG | SYSTOLIC BLOOD PRESSURE: 124 MMHG | OXYGEN SATURATION: 96 %

## 2025-03-30 VITALS — OXYGEN SATURATION: 82 %

## 2025-03-30 VITALS — TEMPERATURE: 97.3 F | OXYGEN SATURATION: 93 % | SYSTOLIC BLOOD PRESSURE: 126 MMHG | DIASTOLIC BLOOD PRESSURE: 90 MMHG

## 2025-03-30 VITALS — TEMPERATURE: 97.2 F | DIASTOLIC BLOOD PRESSURE: 82 MMHG | SYSTOLIC BLOOD PRESSURE: 137 MMHG | OXYGEN SATURATION: 95 %

## 2025-03-30 VITALS — DIASTOLIC BLOOD PRESSURE: 66 MMHG | TEMPERATURE: 97.2 F | OXYGEN SATURATION: 94 % | SYSTOLIC BLOOD PRESSURE: 141 MMHG

## 2025-03-30 VITALS — TEMPERATURE: 97.2 F | OXYGEN SATURATION: 93 % | DIASTOLIC BLOOD PRESSURE: 97 MMHG | SYSTOLIC BLOOD PRESSURE: 137 MMHG

## 2025-03-30 VITALS — SYSTOLIC BLOOD PRESSURE: 145 MMHG | OXYGEN SATURATION: 97 % | TEMPERATURE: 97.3 F | DIASTOLIC BLOOD PRESSURE: 92 MMHG

## 2025-03-30 VITALS — TEMPERATURE: 97.3 F | DIASTOLIC BLOOD PRESSURE: 63 MMHG | OXYGEN SATURATION: 90 % | SYSTOLIC BLOOD PRESSURE: 137 MMHG

## 2025-03-30 VITALS — OXYGEN SATURATION: 92 %

## 2025-03-30 VITALS — OXYGEN SATURATION: 69 %

## 2025-03-30 LAB
ALBUMIN SERPL BCG-MCNC: 2.8 G/DL (ref 3.2–5.2)
ALP SERPL-CCNC: 113 U/L (ref 35–104)
ALT SERPL W P-5'-P-CCNC: 25 U/L (ref 7–40)
AST SERPL-CCNC: 20 U/L (ref ?–34)
BASOPHILS NFR BLD AUTO: 0.5 % (ref 0–1)
BILIRUB SERPL-MCNC: 1.4 MG/DL (ref 0.3–1.2)
BUN SERPL-MCNC: 17 MG/DL (ref 9–23)
CALCIUM SERPL-MCNC: 8.3 MG/DL (ref 8.3–10.6)
CHLORIDE SERPL-SCNC: 104 MMOL/L (ref 98–107)
CO2 SERPL-SCNC: 29 MMOL/L (ref 20–31)
CREAT SERPL-MCNC: 0.54 MG/DL (ref 0.55–1.3)
EOSINOPHIL # BLD AUTO: 0.2 10^3/UL (ref 0–0.5)
GFR SERPL CREATININE-BSD FRML MDRD: > 60 ML/MIN/{1.73_M2} (ref 39–?)
GLUCOSE SERPL-MCNC: 125 MG/DL (ref 74–106)
HCT VFR BLD AUTO: 39.4 % (ref 36–47)
HGB BLD-MCNC: 12.2 G/DL (ref 12–15.5)
KETONES UR STRIP.AUTO-MCNC: NEGATIVE MG/DL
LEUKOCYTE ESTERASE UR QL STRIP.AUTO: NEGATIVE
LYMPHOCYTES # BLD AUTO: 1.2 10^3/UL (ref 1.5–5)
LYMPHOCYTES NFR BLD AUTO: 20.4 % (ref 24–44)
MCH RBC QN AUTO: 33.2 PG (ref 27–33)
MCV RBC AUTO: 107.1 FL (ref 80–96)
MONOCYTES # BLD AUTO: 0.7 10^3/UL (ref 0–0.8)
MONOCYTES NFR BLD AUTO: 11.8 % (ref 2–8)
MUCOUS THREADS UR QL AUTO: (no result)
NEUTROPHILS # BLD AUTO: 3.7 10^3/UL (ref 1.5–8.5)
NEUTROPHILS NFR BLD AUTO: 63.9 % (ref 36–66)
NITRITE UR QL STRIP.AUTO: NEGATIVE
PLATELET # BLD AUTO: 203 10^3/UL (ref 150–450)
POTASSIUM SERPL-SCNC: 4.3 MMOL/L (ref 3.5–5.1)
PROCALCITONIN SERPL-MCNC: 0.06 NG/ML
PROT SERPL-MCNC: 6.4 G/DL (ref 5.7–8.2)
RBC # BLD AUTO: 3.68 10^6/UL (ref 4–5.4)
RBC # UR AUTO: 0 /HPF (ref 0–3)
SODIUM SERPL-SCNC: 141 MMOL/L (ref 136–145)
SQUAMOUS UR QL AUTO: 2 /HPF (ref 0–6)
WBC # BLD AUTO: 5.7 10^3/UL (ref 4–10)
WBC UR QL AUTO: 0 /HPF (ref 0–3)

## 2025-03-31 VITALS — OXYGEN SATURATION: 95 %

## 2025-03-31 VITALS — SYSTOLIC BLOOD PRESSURE: 122 MMHG | TEMPERATURE: 97.3 F | OXYGEN SATURATION: 91 % | DIASTOLIC BLOOD PRESSURE: 80 MMHG

## 2025-03-31 VITALS — SYSTOLIC BLOOD PRESSURE: 141 MMHG | TEMPERATURE: 97.3 F | DIASTOLIC BLOOD PRESSURE: 91 MMHG | OXYGEN SATURATION: 97 %

## 2025-03-31 VITALS — OXYGEN SATURATION: 84 %

## 2025-03-31 VITALS — TEMPERATURE: 97.3 F | SYSTOLIC BLOOD PRESSURE: 130 MMHG | DIASTOLIC BLOOD PRESSURE: 86 MMHG | OXYGEN SATURATION: 95 %

## 2025-03-31 VITALS — SYSTOLIC BLOOD PRESSURE: 126 MMHG | OXYGEN SATURATION: 95 % | TEMPERATURE: 97.7 F | DIASTOLIC BLOOD PRESSURE: 86 MMHG

## 2025-03-31 VITALS — OXYGEN SATURATION: 96 %

## 2025-03-31 VITALS — OXYGEN SATURATION: 93 %

## 2025-03-31 LAB
ALBUMIN SERPL BCG-MCNC: 2.8 G/DL (ref 3.2–5.2)
ALP SERPL-CCNC: 112 U/L (ref 35–104)
ALT SERPL W P-5'-P-CCNC: 25 U/L (ref 7–40)
AST SERPL-CCNC: 22 U/L (ref ?–34)
BASOPHILS NFR BLD AUTO: 0.6 % (ref 0–1)
BILIRUB SERPL-MCNC: 1.5 MG/DL (ref 0.3–1.2)
BUN SERPL-MCNC: 16 MG/DL (ref 9–23)
CALCIUM SERPL-MCNC: 8.3 MG/DL (ref 8.3–10.6)
CHLORIDE SERPL-SCNC: 103 MMOL/L (ref 98–107)
CO2 SERPL-SCNC: 31 MMOL/L (ref 20–31)
CREAT SERPL-MCNC: 0.53 MG/DL (ref 0.55–1.3)
EOSINOPHIL # BLD AUTO: 0.2 10^3/UL (ref 0–0.5)
EOSINOPHIL NFR BLD AUTO: 2.9 % (ref 0–3)
GFR SERPL CREATININE-BSD FRML MDRD: > 60 ML/MIN/{1.73_M2} (ref 39–?)
GLUCOSE SERPL-MCNC: 117 MG/DL (ref 74–106)
HCT VFR BLD AUTO: 40.6 % (ref 36–47)
HGB BLD-MCNC: 12.7 G/DL (ref 12–15.5)
KETONES UR STRIP.AUTO-MCNC: NEGATIVE MG/DL
LEUKOCYTE ESTERASE UR QL STRIP.AUTO: NEGATIVE
LYMPHOCYTES NFR BLD AUTO: 19.5 % (ref 24–44)
MCH RBC QN AUTO: 33.2 PG (ref 27–33)
MCHC RBC AUTO-ENTMCNC: 31.3 G/DL (ref 32–36.5)
MCV RBC AUTO: 106.3 FL (ref 80–96)
MONOCYTES # BLD AUTO: 0.6 10^3/UL (ref 0–0.8)
MONOCYTES NFR BLD AUTO: 12.1 % (ref 2–8)
MUCOUS THREADS UR QL AUTO: (no result)
NEUTROPHILS # BLD AUTO: 3.3 10^3/UL (ref 1.5–8.5)
NEUTROPHILS NFR BLD AUTO: 64.7 % (ref 36–66)
NITRITE UR QL STRIP.AUTO: NEGATIVE
PLATELET # BLD AUTO: 189 10^3/UL (ref 150–450)
PROT SERPL-MCNC: 6.4 G/DL (ref 5.7–8.2)
RBC # BLD AUTO: 3.82 10^6/UL (ref 4–5.4)
RBC # UR AUTO: 1 /HPF (ref 0–3)
SODIUM SERPL-SCNC: 141 MMOL/L (ref 136–145)
SQUAMOUS UR QL AUTO: 9 /HPF (ref 0–6)
WBC # BLD AUTO: 5.1 10^3/UL (ref 4–10)
WBC UR QL AUTO: 2 /HPF (ref 0–3)

## 2025-03-31 PROCEDURE — 0JBR0ZZ EXCISION OF LEFT FOOT SUBCUTANEOUS TISSUE AND FASCIA, OPEN APPROACH: ICD-10-PCS | Performed by: PODIATRIST

## 2025-03-31 RX ADMIN — CETIRIZINE HYDROCHLORIDE SCH MG: 10 TABLET ORAL at 11:14

## 2025-04-01 VITALS — OXYGEN SATURATION: 86 % | SYSTOLIC BLOOD PRESSURE: 101 MMHG | DIASTOLIC BLOOD PRESSURE: 82 MMHG

## 2025-04-01 VITALS — OXYGEN SATURATION: 92 %

## 2025-04-01 VITALS — TEMPERATURE: 97.2 F | SYSTOLIC BLOOD PRESSURE: 129 MMHG | OXYGEN SATURATION: 92 % | DIASTOLIC BLOOD PRESSURE: 80 MMHG

## 2025-04-01 VITALS — TEMPERATURE: 97.2 F | SYSTOLIC BLOOD PRESSURE: 129 MMHG | OXYGEN SATURATION: 94 % | DIASTOLIC BLOOD PRESSURE: 80 MMHG

## 2025-04-01 VITALS — DIASTOLIC BLOOD PRESSURE: 88 MMHG | TEMPERATURE: 97.7 F | SYSTOLIC BLOOD PRESSURE: 134 MMHG | OXYGEN SATURATION: 93 %

## 2025-04-01 VITALS — OXYGEN SATURATION: 96 % | DIASTOLIC BLOOD PRESSURE: 83 MMHG | TEMPERATURE: 96.8 F | SYSTOLIC BLOOD PRESSURE: 129 MMHG

## 2025-04-01 VITALS — TEMPERATURE: 97.2 F | SYSTOLIC BLOOD PRESSURE: 125 MMHG | OXYGEN SATURATION: 97 % | DIASTOLIC BLOOD PRESSURE: 63 MMHG

## 2025-04-01 VITALS — OXYGEN SATURATION: 90 %

## 2025-04-01 VITALS — DIASTOLIC BLOOD PRESSURE: 88 MMHG | SYSTOLIC BLOOD PRESSURE: 134 MMHG

## 2025-04-01 LAB
ALBUMIN SERPL BCG-MCNC: 2.8 G/DL (ref 3.2–5.2)
ALP SERPL-CCNC: 116 U/L (ref 35–104)
ALT SERPL W P-5'-P-CCNC: 33 U/L (ref 7–40)
AST SERPL-CCNC: 28 U/L (ref ?–34)
BASOPHILS # BLD AUTO: 0.1 10^3/UL (ref 0–0.2)
BASOPHILS NFR BLD AUTO: 0.9 % (ref 0–1)
BILIRUB SERPL-MCNC: 1.2 MG/DL (ref 0.3–1.2)
BUN SERPL-MCNC: 18 MG/DL (ref 9–23)
CALCIUM SERPL-MCNC: 8.3 MG/DL (ref 8.3–10.6)
CHLORIDE SERPL-SCNC: 103 MMOL/L (ref 98–107)
CO2 SERPL-SCNC: 30 MMOL/L (ref 20–31)
CREAT SERPL-MCNC: 0.52 MG/DL (ref 0.55–1.3)
EOSINOPHIL # BLD AUTO: 0.2 10^3/UL (ref 0–0.5)
EOSINOPHIL NFR BLD AUTO: 3.1 % (ref 0–3)
GFR SERPL CREATININE-BSD FRML MDRD: > 60 ML/MIN/{1.73_M2} (ref 39–?)
GLUCOSE SERPL-MCNC: 111 MG/DL (ref 74–106)
HCT VFR BLD AUTO: 37.4 % (ref 36–47)
HGB BLD-MCNC: 11.5 G/DL (ref 12–15.5)
LYMPHOCYTES # BLD AUTO: 1.3 10^3/UL (ref 1.5–5)
LYMPHOCYTES NFR BLD AUTO: 23.1 % (ref 24–44)
MCH RBC QN AUTO: 32.5 PG (ref 27–33)
MCHC RBC AUTO-ENTMCNC: 30.7 G/DL (ref 32–36.5)
MCV RBC AUTO: 105.6 FL (ref 80–96)
MONOCYTES # BLD AUTO: 0.7 10^3/UL (ref 0–0.8)
MONOCYTES NFR BLD AUTO: 12.6 % (ref 2–8)
NEUTROPHILS # BLD AUTO: 3.3 10^3/UL (ref 1.5–8.5)
NEUTROPHILS NFR BLD AUTO: 60.1 % (ref 36–66)
PLATELET # BLD AUTO: 187 10^3/UL (ref 150–450)
PROT SERPL-MCNC: 6.4 G/DL (ref 5.7–8.2)
RBC # BLD AUTO: 3.54 10^6/UL (ref 4–5.4)
SODIUM SERPL-SCNC: 141 MMOL/L (ref 136–145)
WBC # BLD AUTO: 5.5 10^3/UL (ref 4–10)

## 2025-04-02 VITALS — DIASTOLIC BLOOD PRESSURE: 72 MMHG | SYSTOLIC BLOOD PRESSURE: 134 MMHG | TEMPERATURE: 97.3 F | OXYGEN SATURATION: 96 %

## 2025-04-02 VITALS — SYSTOLIC BLOOD PRESSURE: 130 MMHG | DIASTOLIC BLOOD PRESSURE: 70 MMHG | TEMPERATURE: 97.2 F | OXYGEN SATURATION: 95 %

## 2025-04-02 VITALS — TEMPERATURE: 97.3 F | OXYGEN SATURATION: 93 % | DIASTOLIC BLOOD PRESSURE: 82 MMHG | SYSTOLIC BLOOD PRESSURE: 138 MMHG

## 2025-04-02 VITALS — DIASTOLIC BLOOD PRESSURE: 105 MMHG | SYSTOLIC BLOOD PRESSURE: 153 MMHG | TEMPERATURE: 97.3 F | OXYGEN SATURATION: 93 %

## 2025-04-02 VITALS — SYSTOLIC BLOOD PRESSURE: 138 MMHG | DIASTOLIC BLOOD PRESSURE: 83 MMHG | TEMPERATURE: 97 F | OXYGEN SATURATION: 95 %

## 2025-04-02 VITALS — DIASTOLIC BLOOD PRESSURE: 85 MMHG | OXYGEN SATURATION: 97 % | TEMPERATURE: 97.7 F | SYSTOLIC BLOOD PRESSURE: 141 MMHG

## 2025-04-02 LAB
ALBUMIN SERPL BCG-MCNC: 2.9 G/DL (ref 3.2–5.2)
ALP SERPL-CCNC: 119 U/L (ref 35–104)
ALT SERPL W P-5'-P-CCNC: 32 U/L (ref 7–40)
AST SERPL-CCNC: 37 U/L (ref ?–34)
BASOPHILS # BLD AUTO: 0.1 10^3/UL (ref 0–0.2)
BASOPHILS NFR BLD AUTO: 0.9 % (ref 0–1)
BILIRUB SERPL-MCNC: 1.2 MG/DL (ref 0.3–1.2)
BUN SERPL-MCNC: 21 MG/DL (ref 9–23)
CALCIUM SERPL-MCNC: 8.6 MG/DL (ref 8.3–10.6)
CHLORIDE SERPL-SCNC: 104 MMOL/L (ref 98–107)
CO2 SERPL-SCNC: 30 MMOL/L (ref 20–31)
EOSINOPHIL # BLD AUTO: 0.2 10^3/UL (ref 0–0.5)
GFR SERPL CREATININE-BSD FRML MDRD: > 60 ML/MIN/{1.73_M2} (ref 39–?)
GLUCOSE SERPL-MCNC: 112 MG/DL (ref 74–106)
HCT VFR BLD AUTO: 38.9 % (ref 36–47)
LYMPHOCYTES # BLD AUTO: 1.1 10^3/UL (ref 1.5–5)
LYMPHOCYTES NFR BLD AUTO: 19.9 % (ref 24–44)
MCHC RBC AUTO-ENTMCNC: 30.8 G/DL (ref 32–36.5)
MCV RBC AUTO: 106.9 FL (ref 80–96)
MONOCYTES # BLD AUTO: 0.7 10^3/UL (ref 0–0.8)
MONOCYTES NFR BLD AUTO: 11.7 % (ref 2–8)
NEUTROPHILS # BLD AUTO: 3.7 10^3/UL (ref 1.5–8.5)
NEUTROPHILS NFR BLD AUTO: 64.2 % (ref 36–66)
PLATELET # BLD AUTO: 172 10^3/UL (ref 150–450)
PROT SERPL-MCNC: 6.6 G/DL (ref 5.7–8.2)
RBC # BLD AUTO: 3.64 10^6/UL (ref 4–5.4)
SODIUM SERPL-SCNC: 142 MMOL/L (ref 136–145)
WBC # BLD AUTO: 5.7 10^3/UL (ref 4–10)

## 2025-04-03 VITALS — OXYGEN SATURATION: 86 %

## 2025-04-03 VITALS — TEMPERATURE: 97.7 F | DIASTOLIC BLOOD PRESSURE: 80 MMHG | OXYGEN SATURATION: 95 % | SYSTOLIC BLOOD PRESSURE: 132 MMHG

## 2025-04-03 VITALS — DIASTOLIC BLOOD PRESSURE: 77 MMHG | OXYGEN SATURATION: 96 % | SYSTOLIC BLOOD PRESSURE: 137 MMHG | TEMPERATURE: 97.7 F

## 2025-04-03 VITALS — DIASTOLIC BLOOD PRESSURE: 82 MMHG | SYSTOLIC BLOOD PRESSURE: 143 MMHG | TEMPERATURE: 97.3 F | OXYGEN SATURATION: 95 %

## 2025-04-03 VITALS — OXYGEN SATURATION: 84 %

## 2025-04-03 VITALS — OXYGEN SATURATION: 96 %

## 2025-04-03 VITALS — SYSTOLIC BLOOD PRESSURE: 143 MMHG | DIASTOLIC BLOOD PRESSURE: 84 MMHG | OXYGEN SATURATION: 93 %

## 2025-04-03 VITALS — OXYGEN SATURATION: 94 %

## 2025-04-03 VITALS — OXYGEN SATURATION: 90 %

## 2025-04-03 VITALS — OXYGEN SATURATION: 77 %

## 2025-04-03 VITALS — OXYGEN SATURATION: 95 %

## 2025-04-03 LAB
ALBUMIN SERPL BCG-MCNC: 2.8 G/DL (ref 3.2–5.2)
ALP SERPL-CCNC: 117 U/L (ref 35–104)
ALT SERPL W P-5'-P-CCNC: 29 U/L (ref 7–40)
AST SERPL-CCNC: 21 U/L (ref ?–34)
BASOPHILS NFR BLD AUTO: 0.7 % (ref 0–1)
BILIRUB SERPL-MCNC: 1.4 MG/DL (ref 0.3–1.2)
BUN SERPL-MCNC: 17 MG/DL (ref 9–23)
CALCIUM SERPL-MCNC: 8.4 MG/DL (ref 8.3–10.6)
CHLORIDE SERPL-SCNC: 104 MMOL/L (ref 98–107)
CO2 SERPL-SCNC: 31 MMOL/L (ref 20–31)
CREAT SERPL-MCNC: 0.48 MG/DL (ref 0.55–1.3)
EOSINOPHIL # BLD AUTO: 0.2 10^3/UL (ref 0–0.5)
EOSINOPHIL NFR BLD AUTO: 2.6 % (ref 0–3)
GFR SERPL CREATININE-BSD FRML MDRD: > 60 ML/MIN/{1.73_M2} (ref 39–?)
GLUCOSE SERPL-MCNC: 106 MG/DL (ref 74–106)
HCT VFR BLD AUTO: 36.9 % (ref 36–47)
HGB BLD-MCNC: 11.6 G/DL (ref 12–15.5)
LYMPHOCYTES # BLD AUTO: 1.1 10^3/UL (ref 1.5–5)
LYMPHOCYTES NFR BLD AUTO: 18.8 % (ref 24–44)
MCH RBC QN AUTO: 33.2 PG (ref 27–33)
MCHC RBC AUTO-ENTMCNC: 31.4 G/DL (ref 32–36.5)
MCV RBC AUTO: 105.7 FL (ref 80–96)
MONOCYTES # BLD AUTO: 0.8 10^3/UL (ref 0–0.8)
MONOCYTES NFR BLD AUTO: 12.7 % (ref 2–8)
NEUTROPHILS # BLD AUTO: 3.9 10^3/UL (ref 1.5–8.5)
PLATELET # BLD AUTO: 183 10^3/UL (ref 150–450)
POTASSIUM SERPL-SCNC: 4.2 MMOL/L (ref 3.5–5.1)
PROT SERPL-MCNC: 6.4 G/DL (ref 5.7–8.2)
RBC # BLD AUTO: 3.49 10^6/UL (ref 4–5.4)
SODIUM SERPL-SCNC: 142 MMOL/L (ref 136–145)
WBC # BLD AUTO: 6.1 10^3/UL (ref 4–10)

## 2025-04-04 VITALS — TEMPERATURE: 97.7 F | SYSTOLIC BLOOD PRESSURE: 125 MMHG | OXYGEN SATURATION: 95 % | DIASTOLIC BLOOD PRESSURE: 81 MMHG

## 2025-04-04 VITALS — OXYGEN SATURATION: 96 % | TEMPERATURE: 97.5 F | SYSTOLIC BLOOD PRESSURE: 137 MMHG | DIASTOLIC BLOOD PRESSURE: 84 MMHG

## 2025-04-04 VITALS — OXYGEN SATURATION: 97 % | SYSTOLIC BLOOD PRESSURE: 134 MMHG | DIASTOLIC BLOOD PRESSURE: 83 MMHG | TEMPERATURE: 97 F

## 2025-04-04 VITALS — OXYGEN SATURATION: 91 %

## 2025-04-04 VITALS — OXYGEN SATURATION: 74 %

## 2025-04-04 VITALS — OXYGEN SATURATION: 88 %

## 2025-04-04 VITALS — OXYGEN SATURATION: 96 %

## 2025-04-04 VITALS — OXYGEN SATURATION: 84 %

## 2025-04-04 VITALS — OXYGEN SATURATION: 89 %

## 2025-04-04 LAB
ALBUMIN SERPL BCG-MCNC: 2.9 G/DL (ref 3.2–5.2)
ALP SERPL-CCNC: 117 U/L (ref 35–104)
ALT SERPL W P-5'-P-CCNC: 31 U/L (ref 7–40)
AST SERPL-CCNC: 24 U/L (ref ?–34)
BASOPHILS NFR BLD AUTO: 0.7 % (ref 0–1)
BILIRUB SERPL-MCNC: 1.6 MG/DL (ref 0.3–1.2)
BUN SERPL-MCNC: 20 MG/DL (ref 9–23)
BUN SERPL-MCNC: 27 MG/DL (ref 9–23)
CA-I BLD-MCNC: 4.3 MG/DL (ref 4.5–5.3)
CALCIUM SERPL-MCNC: 8.5 MG/DL (ref 8.3–10.6)
CALCIUM SERPL-MCNC: 9.1 MG/DL (ref 8.3–10.6)
CHLORIDE SERPL-SCNC: 101 MMOL/L (ref 98–107)
CHLORIDE SERPL-SCNC: 94 MMOL/L (ref 98–107)
CO2 SERPL-SCNC: 33 MMOL/L (ref 20–31)
CO2 SERPL-SCNC: 38 MMOL/L (ref 20–31)
CREAT SERPL-MCNC: 0.57 MG/DL (ref 0.55–1.3)
CREAT SERPL-MCNC: 0.65 MG/DL (ref 0.55–1.3)
EOSINOPHIL # BLD AUTO: 0.2 10^3/UL (ref 0–0.5)
EOSINOPHIL NFR BLD AUTO: 3.2 % (ref 0–3)
GFR SERPL CREATININE-BSD FRML MDRD: > 60 ML/MIN/{1.73_M2} (ref 39–?)
GFR SERPL CREATININE-BSD FRML MDRD: > 60 ML/MIN/{1.73_M2} (ref 39–?)
GLUCOSE SERPL-MCNC: 110 MG/DL (ref 74–106)
GLUCOSE SERPL-MCNC: 199 MG/DL (ref 74–106)
HCT VFR BLD AUTO: 37.7 % (ref 36–47)
HGB BLD-MCNC: 11.7 G/DL (ref 12–15.5)
LYMPHOCYTES # BLD AUTO: 1.2 10^3/UL (ref 1.5–5)
MAGNESIUM SERPL-MCNC: 1.9 MG/DL (ref 1.8–2.4)
MCH RBC QN AUTO: 33.1 PG (ref 27–33)
MCV RBC AUTO: 106.5 FL (ref 80–96)
MONOCYTES # BLD AUTO: 0.6 10^3/UL (ref 0–0.8)
MONOCYTES NFR BLD AUTO: 10.1 % (ref 2–8)
NEUTROPHILS # BLD AUTO: 3.6 10^3/UL (ref 1.5–8.5)
NEUTROPHILS NFR BLD AUTO: 63.8 % (ref 36–66)
PLATELET # BLD AUTO: 183 10^3/UL (ref 150–450)
POTASSIUM SERPL-SCNC: 3.8 MMOL/L (ref 3.5–5.1)
POTASSIUM SERPL-SCNC: 4.1 MMOL/L (ref 3.5–5.1)
PROT SERPL-MCNC: 6.5 G/DL (ref 5.7–8.2)
RBC # BLD AUTO: 3.54 10^6/UL (ref 4–5.4)
SODIUM SERPL-SCNC: 139 MMOL/L (ref 136–145)
SODIUM SERPL-SCNC: 140 MMOL/L (ref 136–145)
WBC # BLD AUTO: 5.6 10^3/UL (ref 4–10)

## 2025-04-05 VITALS — OXYGEN SATURATION: 93 % | DIASTOLIC BLOOD PRESSURE: 63 MMHG | TEMPERATURE: 97.2 F | SYSTOLIC BLOOD PRESSURE: 109 MMHG

## 2025-04-05 VITALS — OXYGEN SATURATION: 95 %

## 2025-04-05 LAB
ALBUMIN SERPL BCG-MCNC: 3.1 G/DL (ref 3.2–5.2)
ALP SERPL-CCNC: 129 U/L (ref 35–104)
ALT SERPL W P-5'-P-CCNC: 29 U/L (ref 7–40)
ANISOCYTOSIS BLD QL SMEAR: (no result)
AST SERPL-CCNC: 22 U/L (ref ?–34)
BASOPHILS NFR BLD MANUAL: 1 % (ref 0–1)
BUN SERPL-MCNC: 25 MG/DL (ref 9–23)
CALCIUM SERPL-MCNC: 9.2 MG/DL (ref 8.3–10.6)
CHLORIDE SERPL-SCNC: 94 MMOL/L (ref 98–107)
CO2 SERPL-SCNC: 37 MMOL/L (ref 20–31)
CREAT SERPL-MCNC: 0.56 MG/DL (ref 0.55–1.3)
EOSINOPHIL NFR BLD MANUAL: 6 % (ref 0–3)
GFR SERPL CREATININE-BSD FRML MDRD: > 60 ML/MIN/{1.73_M2} (ref 39–?)
GLUCOSE SERPL-MCNC: 127 MG/DL (ref 74–106)
HCT VFR BLD AUTO: 42.4 % (ref 36–47)
HGB BLD-MCNC: 13.6 G/DL (ref 12–15.5)
LYMPHOCYTES NFR BLD MANUAL: 14 % (ref 16–44)
MACROCYTES BLD QL SMEAR: (no result)
MCH RBC QN AUTO: 32.9 PG (ref 27–33)
MCHC RBC AUTO-ENTMCNC: 32.1 G/DL (ref 32–36.5)
MCV RBC AUTO: 102.4 FL (ref 80–96)
MONOCYTES NFR BLD MANUAL: 9 % (ref 0–5)
NEUTROPHILS NFR BLD MANUAL: 60 % (ref 28–66)
PLATELET # BLD AUTO: 213 10^3/UL (ref 150–450)
PLATELET BLD QL SMEAR: NORMAL
RBC # BLD AUTO: 4.14 10^6/UL (ref 4–5.4)
SODIUM SERPL-SCNC: 138 MMOL/L (ref 136–145)
VARIANT LYMPHS NFR BLD MANUAL: 9 % (ref 0–5)
WBC # BLD AUTO: 6.9 10^3/UL (ref 4–10)

## 2025-04-05 RX ADMIN — FUROSEMIDE SCH MG: 40 TABLET ORAL at 10:39

## 2025-04-06 VITALS — OXYGEN SATURATION: 93 %

## 2025-04-06 VITALS — SYSTOLIC BLOOD PRESSURE: 126 MMHG | OXYGEN SATURATION: 94 % | TEMPERATURE: 97.2 F | DIASTOLIC BLOOD PRESSURE: 66 MMHG

## 2025-04-07 VITALS — SYSTOLIC BLOOD PRESSURE: 152 MMHG | DIASTOLIC BLOOD PRESSURE: 73 MMHG | TEMPERATURE: 97 F | OXYGEN SATURATION: 95 %

## 2025-04-07 LAB
BUN SERPL-MCNC: 29 MG/DL (ref 9–23)
CALCIUM SERPL-MCNC: 9.5 MG/DL (ref 8.3–10.6)
CHLORIDE SERPL-SCNC: 98 MMOL/L (ref 98–107)
CO2 SERPL-SCNC: 32 MMOL/L (ref 20–31)
GFR SERPL CREATININE-BSD FRML MDRD: > 60 ML/MIN/{1.73_M2} (ref 39–?)
GLUCOSE SERPL-MCNC: 153 MG/DL (ref 74–106)
HCT VFR BLD AUTO: 44.7 % (ref 36–47)
HGB BLD-MCNC: 14.3 G/DL (ref 12–15.5)
MCH RBC QN AUTO: 32.8 PG (ref 27–33)
MCV RBC AUTO: 102.5 FL (ref 80–96)
PLATELET # BLD AUTO: 246 10^3/UL (ref 150–450)
POTASSIUM SERPL-SCNC: 4.2 MMOL/L (ref 3.5–5.1)
RBC # BLD AUTO: 4.36 10^6/UL (ref 4–5.4)
SODIUM SERPL-SCNC: 139 MMOL/L (ref 136–145)
WBC # BLD AUTO: 7.9 10^3/UL (ref 4–10)

## 2025-04-08 VITALS — SYSTOLIC BLOOD PRESSURE: 123 MMHG | OXYGEN SATURATION: 94 % | DIASTOLIC BLOOD PRESSURE: 85 MMHG | TEMPERATURE: 97.2 F

## 2025-04-08 VITALS — TEMPERATURE: 97 F | DIASTOLIC BLOOD PRESSURE: 78 MMHG | SYSTOLIC BLOOD PRESSURE: 121 MMHG | OXYGEN SATURATION: 90 %

## 2025-04-09 VITALS — OXYGEN SATURATION: 94 %

## 2025-04-09 VITALS — TEMPERATURE: 97.5 F | DIASTOLIC BLOOD PRESSURE: 76 MMHG | OXYGEN SATURATION: 94 % | SYSTOLIC BLOOD PRESSURE: 116 MMHG

## 2025-04-10 VITALS — OXYGEN SATURATION: 94 %

## 2025-04-10 VITALS — OXYGEN SATURATION: 97 %

## 2025-04-10 VITALS — SYSTOLIC BLOOD PRESSURE: 116 MMHG | OXYGEN SATURATION: 92 % | TEMPERATURE: 97.7 F | DIASTOLIC BLOOD PRESSURE: 73 MMHG

## 2025-04-10 VITALS — OXYGEN SATURATION: 84 %

## 2025-04-10 LAB
BUN SERPL-MCNC: 27 MG/DL (ref 9–23)
CHLORIDE SERPL-SCNC: 102 MMOL/L (ref 98–107)
CO2 SERPL-SCNC: 30 MMOL/L (ref 20–31)
CREAT SERPL-MCNC: 0.48 MG/DL (ref 0.55–1.3)
GFR SERPL CREATININE-BSD FRML MDRD: > 90 ML/MIN/{1.73_M2} (ref 39–?)
GLUCOSE SERPL-MCNC: 152 MG/DL (ref 74–106)
HCT VFR BLD AUTO: 42.2 % (ref 36–47)
MCHC RBC AUTO-ENTMCNC: 30.8 G/DL (ref 32–36.5)
MCV RBC AUTO: 103.9 FL (ref 80–96)
PLATELET # BLD AUTO: 246 10^3/UL (ref 150–450)
POTASSIUM SERPL-SCNC: 4.3 MMOL/L (ref 3.5–5.1)
RBC # BLD AUTO: 4.06 10^6/UL (ref 4–5.4)
SODIUM SERPL-SCNC: 141 MMOL/L (ref 136–145)
WBC # BLD AUTO: 7.7 10^3/UL (ref 4–10)

## 2025-04-11 VITALS — TEMPERATURE: 97.7 F | OXYGEN SATURATION: 97 % | DIASTOLIC BLOOD PRESSURE: 87 MMHG | SYSTOLIC BLOOD PRESSURE: 137 MMHG

## 2025-04-11 VITALS — OXYGEN SATURATION: 94 %

## 2025-04-12 VITALS — SYSTOLIC BLOOD PRESSURE: 127 MMHG | DIASTOLIC BLOOD PRESSURE: 76 MMHG | OXYGEN SATURATION: 92 % | TEMPERATURE: 97.7 F

## 2025-04-13 VITALS — OXYGEN SATURATION: 97 % | SYSTOLIC BLOOD PRESSURE: 144 MMHG | TEMPERATURE: 97.9 F | DIASTOLIC BLOOD PRESSURE: 77 MMHG

## 2025-04-14 VITALS — SYSTOLIC BLOOD PRESSURE: 125 MMHG | OXYGEN SATURATION: 97 % | DIASTOLIC BLOOD PRESSURE: 73 MMHG | TEMPERATURE: 97.7 F

## 2025-04-14 VITALS — OXYGEN SATURATION: 90 % | SYSTOLIC BLOOD PRESSURE: 126 MMHG | DIASTOLIC BLOOD PRESSURE: 74 MMHG | TEMPERATURE: 97.7 F

## 2025-04-14 LAB
BUN SERPL-MCNC: 27 MG/DL (ref 9–23)
CALCIUM SERPL-MCNC: 8.3 MG/DL (ref 8.3–10.6)
CHLORIDE SERPL-SCNC: 106 MMOL/L (ref 98–107)
CO2 SERPL-SCNC: 27 MMOL/L (ref 20–31)
CREAT SERPL-MCNC: 0.51 MG/DL (ref 0.55–1.3)
GFR SERPL CREATININE-BSD FRML MDRD: > 90 ML/MIN/{1.73_M2} (ref 39–?)
GLUCOSE SERPL-MCNC: 150 MG/DL (ref 74–106)
HCT VFR BLD AUTO: 41.2 % (ref 36–47)
MCH RBC QN AUTO: 31.9 PG (ref 27–33)
MCHC RBC AUTO-ENTMCNC: 31.6 G/DL (ref 32–36.5)
MCV RBC AUTO: 101.2 FL (ref 80–96)
PLATELET # BLD AUTO: 246 10^3/UL (ref 150–450)
POTASSIUM SERPL-SCNC: 4.1 MMOL/L (ref 3.5–5.1)
RBC # BLD AUTO: 4.07 10^6/UL (ref 4–5.4)
SODIUM SERPL-SCNC: 142 MMOL/L (ref 136–145)
WBC # BLD AUTO: 5.9 10^3/UL (ref 4–10)

## 2025-04-15 VITALS — OXYGEN SATURATION: 95 % | TEMPERATURE: 97.2 F | DIASTOLIC BLOOD PRESSURE: 73 MMHG | SYSTOLIC BLOOD PRESSURE: 125 MMHG

## 2025-04-15 VITALS — OXYGEN SATURATION: 95 %

## 2025-04-16 VITALS — DIASTOLIC BLOOD PRESSURE: 59 MMHG | SYSTOLIC BLOOD PRESSURE: 105 MMHG

## 2025-04-16 VITALS — DIASTOLIC BLOOD PRESSURE: 53 MMHG | TEMPERATURE: 97.2 F | SYSTOLIC BLOOD PRESSURE: 108 MMHG | OXYGEN SATURATION: 91 %

## 2025-04-16 VITALS — OXYGEN SATURATION: 95 %
